# Patient Record
Sex: FEMALE | Race: WHITE | NOT HISPANIC OR LATINO | Employment: PART TIME | URBAN - METROPOLITAN AREA
[De-identification: names, ages, dates, MRNs, and addresses within clinical notes are randomized per-mention and may not be internally consistent; named-entity substitution may affect disease eponyms.]

---

## 2017-01-03 ENCOUNTER — TRANSCRIBE ORDERS (OUTPATIENT)
Dept: ADMINISTRATIVE | Facility: HOSPITAL | Age: 67
End: 2017-01-03

## 2017-01-03 DIAGNOSIS — Z12.31 SCREENING MAMMOGRAM FOR HIGH-RISK PATIENT: Primary | ICD-10-CM

## 2017-01-03 DIAGNOSIS — Z12.31 ENCOUNTER FOR SCREENING MAMMOGRAM FOR MALIGNANT NEOPLASM OF BREAST: ICD-10-CM

## 2017-01-03 DIAGNOSIS — R92.8 OTHER ABNORMAL AND INCONCLUSIVE FINDINGS ON DIAGNOSTIC IMAGING OF BREAST: ICD-10-CM

## 2017-01-17 ENCOUNTER — HOSPITAL ENCOUNTER (OUTPATIENT)
Dept: MAMMOGRAPHY | Facility: CLINIC | Age: 67
Discharge: HOME | End: 2017-01-17
Payer: COMMERCIAL

## 2017-01-17 ENCOUNTER — GENERIC CONVERSION - ENCOUNTER (OUTPATIENT)
Dept: OTHER | Facility: OTHER | Age: 67
End: 2017-01-17

## 2017-01-17 DIAGNOSIS — Z12.31 ENCOUNTER FOR SCREENING MAMMOGRAM FOR MALIGNANT NEOPLASM OF BREAST: ICD-10-CM

## 2017-01-17 PROCEDURE — G0202 SCR MAMMO BI INCL CAD: HCPCS

## 2017-01-23 ENCOUNTER — HOSPITAL ENCOUNTER (OUTPATIENT)
Dept: ULTRASOUND IMAGING | Facility: CLINIC | Age: 67
Discharge: HOME | End: 2017-01-23
Payer: COMMERCIAL

## 2017-01-23 ENCOUNTER — HOSPITAL ENCOUNTER (OUTPATIENT)
Dept: MAMMOGRAPHY | Facility: CLINIC | Age: 67
Discharge: HOME | End: 2017-01-23
Payer: COMMERCIAL

## 2017-01-23 DIAGNOSIS — R92.8 OTHER ABNORMAL AND INCONCLUSIVE FINDINGS ON DIAGNOSTIC IMAGING OF BREAST: ICD-10-CM

## 2017-01-23 PROCEDURE — 76642 ULTRASOUND BREAST LIMITED: CPT

## 2017-01-23 PROCEDURE — G0206 DX MAMMO INCL CAD UNI: HCPCS

## 2017-01-24 ENCOUNTER — GENERIC CONVERSION - ENCOUNTER (OUTPATIENT)
Dept: OTHER | Facility: OTHER | Age: 67
End: 2017-01-24

## 2017-05-11 ENCOUNTER — GENERIC CONVERSION - ENCOUNTER (OUTPATIENT)
Dept: OTHER | Facility: OTHER | Age: 67
End: 2017-05-11

## 2017-08-02 ENCOUNTER — ALLSCRIPTS OFFICE VISIT (OUTPATIENT)
Dept: OTHER | Facility: OTHER | Age: 67
End: 2017-08-02

## 2017-08-02 DIAGNOSIS — R93.7 ABNORMAL FINDINGS ON DIAGNOSTIC IMAGING OF OTHER PARTS OF MUSCULOSKELETAL SYSTEM: ICD-10-CM

## 2017-09-06 ENCOUNTER — LAB CONVERSION - ENCOUNTER (OUTPATIENT)
Dept: OTHER | Facility: OTHER | Age: 67
End: 2017-09-06

## 2017-09-06 LAB
A/G RATIO (HISTORICAL): 1.4 (CALC) (ref 1–2.5)
ALBUMIN SERPL BCP-MCNC: 3.9 G/DL (ref 3.6–5.1)
ALP SERPL-CCNC: 49 U/L (ref 33–130)
ALT SERPL W P-5'-P-CCNC: 14 U/L (ref 6–29)
AST SERPL W P-5'-P-CCNC: 18 U/L (ref 10–35)
BILIRUB SERPL-MCNC: 1 MG/DL (ref 0.2–1.2)
BUN SERPL-MCNC: 16 MG/DL (ref 7–25)
BUN/CREA RATIO (HISTORICAL): NORMAL (CALC) (ref 6–22)
CALCIUM SERPL-MCNC: 9 MG/DL (ref 8.6–10.4)
CHLORIDE SERPL-SCNC: 104 MMOL/L (ref 98–110)
CHOLEST SERPL-MCNC: 243 MG/DL
CHOLEST/HDLC SERPL: 2.5 (CALC)
CO2 SERPL-SCNC: 29 MMOL/L (ref 20–31)
CREAT SERPL-MCNC: 0.62 MG/DL (ref 0.5–0.99)
EGFR AFRICAN AMERICAN (HISTORICAL): 108 ML/MIN/1.73M2
EGFR-AMERICAN CALC (HISTORICAL): 93 ML/MIN/1.73M2
GAMMA GLOBULIN (HISTORICAL): 2.8 G/DL (CALC) (ref 1.9–3.7)
GLUCOSE (HISTORICAL): 88 MG/DL (ref 65–99)
HDLC SERPL-MCNC: 99 MG/DL
LDL CHOLESTEROL (HISTORICAL): 130 MG/DL (CALC)
NON-HDL-CHOL (CHOL-HDL) (HISTORICAL): 144 MG/DL (CALC)
POTASSIUM SERPL-SCNC: 4.2 MMOL/L (ref 3.5–5.3)
SODIUM SERPL-SCNC: 138 MMOL/L (ref 135–146)
TOTAL PROTEIN (HISTORICAL): 6.7 G/DL (ref 6.1–8.1)
TRIGL SERPL-MCNC: 47 MG/DL

## 2017-09-07 ENCOUNTER — GENERIC CONVERSION - ENCOUNTER (OUTPATIENT)
Dept: OTHER | Facility: OTHER | Age: 67
End: 2017-09-07

## 2017-09-12 ENCOUNTER — LAB CONVERSION - ENCOUNTER (OUTPATIENT)
Dept: OTHER | Facility: OTHER | Age: 67
End: 2017-09-12

## 2017-09-18 ENCOUNTER — GENERIC CONVERSION - ENCOUNTER (OUTPATIENT)
Dept: OTHER | Facility: OTHER | Age: 67
End: 2017-09-18

## 2017-09-18 LAB — OCCULT BLD, FECAL IMMUNOLOGICAL (HISTORICAL): NEGATIVE

## 2017-11-13 ENCOUNTER — HOSPITAL ENCOUNTER (OUTPATIENT)
Dept: MAMMOGRAPHY | Facility: CLINIC | Age: 67
Discharge: HOME | End: 2017-11-13
Payer: COMMERCIAL

## 2017-11-13 DIAGNOSIS — R93.7 ABNORMAL FINDINGS ON DIAGNOSTIC IMAGING OF OTHER PARTS OF MUSCULOSKELETAL SYSTEM: ICD-10-CM

## 2017-11-13 PROCEDURE — 77080 DXA BONE DENSITY AXIAL: CPT

## 2017-11-14 ENCOUNTER — GENERIC CONVERSION - ENCOUNTER (OUTPATIENT)
Dept: OTHER | Facility: OTHER | Age: 67
End: 2017-11-14

## 2018-01-10 NOTE — RESULT NOTES
Verified Results  *US BREAST RIGHT LIMITED (DIAGNOSTIC) [id number] 07:39AM Ascencion Michel Order Number: [id number]    - Patient Instructions: To schedule this appointment, please contact Central Scheduling at [phone]  Test Name Result Flag Reference   US BREAST RIGHT LIMITED (Report)     Patient History:   Patient is postmenopausal    Family history of breast cancer in mother at age 46 and prostate    cancer in brother at age 48 or over  Patient is a former smoker  Patient's BMI is 24 2  Reason for exam: additional evaluation requested from abnormal    screening  Asymmetric density in the upper outer right breast     Mammo Diagnostic Right W CAD: January 23, 2017 - Check In #:    [de-identified]   CC, MLO, and ML view(s) were taken of the right breast      Technologist: RT Andrew(R)(M)   Prior study comparison: January 17, 2017, mammo screening    bilateral W CAD, performed at [facility]  October 15, 2015, digital bilateral screening   mammogram, performed at [address] [place] [address]  There are scattered fibroglandular densities  These images were    obtained using digital technique and with the assistance of    Computer Aided Detection  There are no dominant masses, foci of    architectural distortion or suspicious clusters of calcification    to suggest malignancy  The visualized skin appears normal     Previously described asymmetric density less conspicuous on spot    compression views  Targeted ultrasound demonstrates no evidence of hypoechoic mass    or architectural distortion to suggest malignancy       US Breast Right Limited: January 23, 2017 - Check In #: [de-identified]   Technologist: Salomón Ordaz RDMS     ASSESSMENT: BiRad:2 - Benign (Overall)   Right breast Right Diag Mamm: BiRad:2 - benign finding in the    right breast    Right breast Right Brst US: BiRad:2 - benign finding in the right   breast  Recommendation:   Routine screening mammogram of both breasts in 1 year  Analyzed by CAD     Transcription Location: [address]   Signing Station: [id number]     Risk Value(s):   Tyrer-Cuzick 10 Year: 4 272%, Tyrer-Cuzick Lifetime: 8 481%,    Myriad Table: 1 5%, KAJAL 5 Year: 3 1%, NCI Lifetime: 11 0%   Signed by:   Valerio King MD   1/23/17                             MAMMO DIAGNOSTIC RIGHT W CAD [id number] 07:37AM Mala Compa Order Number: [id number]    - Patient Instructions: To schedule this appointment, please contact Central Scheduling at [phone]  Do not wear any perfume, powder, lotion or deodorant on breast or underarm area  Please bring your doctors order, referral (if needed) and insurance information with you on the day of the test  Failure to bring this information may result in this test being rescheduled  Arrive 15 minutes prior to your appointment time to register  On the day of your test, please bring any prior mammogram or breast studies with you that were not performed at a [facility]  Failure to bring prior exams may result in your test needing to be rescheduled  Test Name Result Flag Reference   MAMMO DIAGNOSTIC RIGHT W CAD (Report)     Patient History:   Patient is postmenopausal    Family history of breast cancer in mother at age 46 and prostate    cancer in brother at age 48 or over  Patient is a former smoker  Patient's BMI is 24 2  Reason for exam: additional evaluation requested from abnormal    screening  Asymmetric density in the upper outer right breast     Mammo Diagnostic Right W CAD: January 23, 2017 - Check In #:    [de-identified]   CC, MLO, and ML view(s) were taken of the right breast      Technologist: RT Suleman(R)(M)   Prior study comparison: January 17, 2017, mammo screening    bilateral W CAD, performed at [address]   October 15, 2015, digital bilateral screening   mammogram, performed at [address] [place] [address]  [address]  There are scattered fibroglandular densities  These images were    obtained using digital technique and with the assistance of    Computer Aided Detection  There are no dominant masses, foci of    architectural distortion or suspicious clusters of calcification    to suggest malignancy  The visualized skin appears normal     Previously described asymmetric density less conspicuous on spot    compression views  Targeted ultrasound demonstrates no evidence of hypoechoic mass    or architectural distortion to suggest malignancy  US Breast Right Limited: January 23, 2017 - Check In #: [de-identified]   Technologist: Sobia Sesay RDMS     ASSESSMENT: BiRad:2 - Benign (Overall)   Right breast Right Diag Mamm: BiRad:2 - benign finding in the    right breast    Right breast Right Brst US: BiRad:2 - benign finding in the right   breast      Recommendation:   Routine screening mammogram of both breasts in 1 year     Analyzed by CAD     Transcription Location: [address]   Signing Station: [id number]     Risk Value(s):   Tyrer-Cuzick 10 Year: 4 272%, Tyrer-Cuzick Lifetime: 8 481%,    Myriad Table: 1 5%, KAJAL 5 Year: 3 1%, NCI Lifetime: 11 0%   Signed by:   Carly Alexander MD   1/23/17

## 2018-01-10 NOTE — PROGRESS NOTES
Assessment    1  GERD (gastroesophageal reflux disease) (530 81) (K21 9)   2  Acute maxillary sinusitis (461 0) (J01 00)    Plan  Acute maxillary sinusitis    · Cephalexin 500 MG Oral Tablet (Cephalexin Monohydrate); TAKE 1 TABLET 3  times daily  GERD (gastroesophageal reflux disease)    · Omeprazole 20 MG Oral Capsule Delayed Release; take 1 capsule daily     start OTC allergy medication  if no improvement with omeprazole, call office for recheck     Chief Complaint  SINUS PRESSURE, GUM PAIN, TONGUE SENSITIVITY X 3WEEKS      History of Present Illness  HPI: sinus congestion, pain above teeth x 3 weeks, not improving  has seasonal allergies, ? allergies     also c/o epigastric pain, worse if eating/drinking late at night, also worse if eating greasy foods  ? acid reflux      Review of Systems    Constitutional: fever and feeling tired  ENT: nasal discharge  Respiratory: cough  Breasts: no complaints of breast pain, breast lump or nipple discharge  Gastrointestinal: abdominal pain, but as noted in HPI  Genitourinary: no complaints of dysuria, no incontinence, no pelvic pain, no dysmenorrhea, no vaginal discharge or abnormal vaginal bleeding  Musculoskeletal: no complaints of arthralgia, no myalgia, no joint swelling or stiffness, no limb pain or swelling  Integumentary: no complaints of skin rash or lesion, no itching or dry skin, no skin wounds  Neurological: no complaints of headache, no confusion, no numbness or tingling, no dizziness or fainting  Active Problems    1  Achilles tendinitis of left lower extremity (726 71) (M76 62)   2  Disorder of bone and cartilage (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 90) (M89 9,M94 9)   3  Hypercholesteremia (272 0) (E78 0)   4  Joint pain of ankle and foot (719 47) (M25 579)   5  Other screening mammogram (V76 12) (Z12 31)    Past Medical History  Active Problems And Past Medical History Reviewed: The active problems and past medical history were reviewed and updated today        Family History    1  Family history of malignant neoplasm of breast (V16 3) (Z80 3)  Family History Reviewed: The family history was reviewed and updated today  Social History    · Former smoker (G15 85) (Q18 533)   · [place]  The social history was reviewed and updated today  Surgical History  Surgical History Reviewed: The surgical history was reviewed and updated today  Current Meds    The medication list was reviewed and updated today  Allergies    1  No Known Drug Allergies    Vitals   Recorded: 21Jan2016 09:51AM   Heart Rate 76   Systolic 960   Diastolic 70   Height 5 ft    Weight 135 lb    BMI Calculated 26 37   BSA Calculated 1 57     Physical Exam    Constitutional   General appearance: No acute distress, well appearing and well nourished  Eyes   Conjunctiva and lids: No swelling, erythema or discharge  Ears, Nose, Mouth, and Throat   External inspection of ears and nose: Normal     Otoscopic examination: Tympanic membranes translucent with normal light reflex  Canals patent without erythema  Nasal mucosa, septum, and turbinates: Normal without edema or erythema  Oropharynx: Normal with no erythema, edema, exudate or lesions  + sinus congestion noted  Pulmonary   Respiratory effort: No increased work of breathing or signs of respiratory distress  Auscultation of lungs: Clear to auscultation  Cardiovascular   Auscultation of heart: Normal rate and rhythm, normal S1 and S2, without murmurs      Musculoskeletal   Gait and station: Normal     Psychiatric   Orientation to person, place, and time: Normal     Mood and affect: Normal          Signatures   Electronically signed by : Yvrose Nino; Jan 21 2016 12:09PM EST                       (Author)    Electronically signed by : Mihaela Dutton MD; Jan 21 2016  2:56PM EST

## 2018-01-12 NOTE — RESULT NOTES
Verified Results  (Q) FECAL GLOBIN BY IMMUNOCHEMISTRY [id number] 12:00AM Andrea Sensor     Test Name Result Flag Reference   FECAL GLOBIN BY$IMMUNOCHEMISTRY      FECAL GLOBIN BY IMMUNOCHEMISTRY         MICRO NUMBER:      [id number]    TEST STATUS:       FINAL    SPECIMEN SOURCE:   INSURE (TM) FOBT TEST CARD    SPECIMEN QUALITY:  ADEQUATE    RESULT:            Not Detected                                               We received an [employer] card with a non-specific                       order  Based upon the specimen submitted, the                       fecal globin test was performed  If this is not                       what you intended to order, please contact your                       local  immediately                       so that we can adjust our billing appropriately  You may also inquire about alternative or                        additional testing

## 2018-01-12 NOTE — RESULT NOTES
Verified Results  * MAMMO SCREENING BILATERAL W CAD [id number] 07:34AM Pilar Perez Order Number: [id number]    - Patient Instructions: To schedule this appointment, please contact Central Scheduling at [phone]  Do not wear any perfume, powder, lotion or deodorant on breast or underarm area  Please bring your doctors order, referral (if needed) and insurance information with you on the day of the test  Failure to bring this information may result in this test being rescheduled  Arrive 15 minutes prior to your appointment time to register  On the day of your test, please bring any prior mammogram or breast studies with you that were not performed at a [facility]  Failure to bring prior exams may result in your test needing to be rescheduled  Test Name Result Flag Reference   MAMMO SCREENING BILATERAL W CAD (Report)     Patient History:   Patient is postmenopausal    Family history of breast cancer in mother at age 46 and prostate    cancer in brother at age 48 or over  Patient is a former smoker  Patient's BMI is 24 2  Reason for exam: screening (asymptomatic)  Screening     Mammo Screening Bilateral W CAD: January 17, 2017 - Check In #:    [de-identified]   Bilateral MLO and CC view(s) were taken  Technologist: PERFECTO Gardner (R)(M)   Prior study comparison: October 15, 2015, digital bilateral    screening mammogram performed at [facility]  August 21, 2014, digital bilateral screening    mammogram performed at [address] [place] [address]  March 13, 2013, digital bilateral screening mammogram    performed at [facility]  January 16, 2012, digital bilateral screening mammogram performed   at [address]  There are scattered fibroglandular densities   No dominant soft    tissue mass, architectural distortion or suspicious    calcifications are noted in either breast  The skin and nipple    contours are within normal limits  There is faintly increased asymmetry in the right outer breast at   the 9 o'clock position at about the mid 3rd depth of the breast    compared with prior examinations  Recommend spot compression    imaging for further assessment  Ultrasound might be helpful as    well  The finding is about 8 cm from nipple  Needs additional imaging  ASSESSMENT: BiRad:0 - Incomplete: needs additional imaging    evaluation - Right     Recommendation:   Further imaging of the right breast    A breast health care nurse from our facility will be contacting    the patient regarding the need for additional imaging  Analyzed by CAD     8-10% of cancers will be missed on mammography  Management of a    palpable abnormality must be based on clinical grounds  Patients   will be notified of their results via letter from our facility  Accredited by [employer] of [employer] and FDA       Transcription Location:  [address]: [id number]     Risk Value(s):   Tyrer-Cuzick 10 Year: 4 272%, Tyrer-Cuzick Lifetime: 8 481%,    Myriad Table: 1 5%, KAJAL 5 Year: 3 1%, NCI Lifetime: 11 0%   Signed by:   Shon Buerger, MD   1/17/17

## 2018-01-13 VITALS
SYSTOLIC BLOOD PRESSURE: 124 MMHG | HEART RATE: 75 BPM | BODY MASS INDEX: 20.42 KG/M2 | WEIGHT: 104 LBS | OXYGEN SATURATION: 98 % | HEIGHT: 60 IN | DIASTOLIC BLOOD PRESSURE: 64 MMHG

## 2018-01-13 NOTE — RESULT NOTES
Message   Recorded as Task   Date: 01/24/2017 06:27 AM, Created By: Mariah Hunter   Task Name: Call Patient with results   Assigned To:  Alan Connor   Regarding Patient: Rene Sparrow, Status: Active   CommentDominique Clause - 24 Jan 2017 6:27 AM     Patient Phone: [phone] - 24 Jan 2017 9:25 AM     TASK EDITED  Msg C# with results        Signatures   Electronically signed by : Jarrod Vazquez, ; Jan 24 2017  9:25AM EST                       (Author)

## 2018-01-16 NOTE — RESULT NOTES
Verified Results  (1) COMPREHENSIVE METABOLIC PANEL [id number] 71:27GD Bogdan Streeter   REPORT COMMENT:  FASTING:YES  MULTIPLE TESTING PRIORITIES; ROUTINE TESTING TO FOLLOW  Test Name Result Flag Reference   GLUCOSE 88 mg/dL  65-99   Fasting reference interval   UREA NITROGEN (BUN) 16 mg/dL  7-25   CREATININE 0 62 mg/dL  0 50-0 99   For patients >52years of age, the reference limit  for Creatinine is approximately 13% higher for people  identified as -American  eGFR NON-AFR  AMERICAN 93 mL/min/1 73m2  > OR = 60   eGFR AFRICAN AMERICAN 108 mL/min/1 73m2  > OR = 60   BUN/CREATININE RATIO   0-76   NOT APPLICABLE (calc)   SODIUM 138 mmol/L  135-146   POTASSIUM 4 2 mmol/L  3 5-5 3   CHLORIDE 104 mmol/L     CARBON DIOXIDE 29 mmol/L  20-31   CALCIUM 9 0 mg/dL  8 6-10 4   PROTEIN, TOTAL 6 7 g/dL  6 1-8 1   ALBUMIN 3 9 g/dL  3 6-5 1   GLOBULIN 2 8 g/dL (calc)  1 9-3 7   ALBUMIN/GLOBULIN RATIO 1 4 (calc)  1 0-2 5   BILIRUBIN, TOTAL 1 0 mg/dL  0 2-1 2   ALKALINE PHOSPHATASE 49 U/L     AST 18 U/L  10-35   ALT 14 U/L  6-29     (Q) LIPID PANEL WITH REFLEX TO DIRECT LDL [id number] 08:49AM Bogdan Streeter     Test Name Result Flag Reference   CHOLESTEROL, TOTAL 243 mg/dL H <200   HDL CHOLESTEROL 99 mg/dL  >19   TRIGLICERIDES 47 mg/dL  <682   LDL-CHOLESTEROL 130 mg/dL (calc) H    Reference range: <100     Desirable range <100 mg/dL for patients with CHD or  diabetes and <70 mg/dL for diabetic patients with  known heart disease  The [facility] calculation is a validated novel   method that provides better accuracy than the   Friedewald equation in the estimation of LDL-C,   particularly when TG levels are 150-400 mg/dL and   LDL-C levels are lower than 70 mg/dL  Reference:  Marjorie Richardson et al  Comparison of a Novel   Method vs the Friedewald Equation for Estimating   Low-Density Lipoprotein Cholesterol Levels From the   Standard Lipid Profile  MILLA  4461;791(56): 0733-8218       For additional information, please refer to  http://education  [place]/faq/OQX664  (This link is being provided for informational/  educational purposes only )   CHOL/HDLC RATIO 2 5 (calc)  <5 0   NON HDL CHOLESTEROL 144 mg/dL (calc) H <130   For patients with diabetes plus 1 major ASCVD risk   factor, treating to a non-HDL-C goal of <100 mg/dL   (LDL-C of <70 mg/dL) is considered a therapeutic   option

## 2018-01-17 NOTE — RESULT NOTES
Verified Results  * DXA BONE DENSITY SPINE HIP AND PELVIS [id number] 02:01PM Lai Arriaza Order Number: [id number]    - Patient Instructions: To schedule this appointment, please contact Central Scheduling at [phone]  Test Name Result Flag Reference   DXA BONE DENSITY SPINE HIP AND PELVIS (Report)     CENTRAL DXA SCAN     CLINICAL HISTORY:  79year old post-menopausal  female risk factors include previous smoking  TECHNIQUE: Bone densitometry was performed using a [employer] C bone densitometer  Regions of interest appear properly placed  There are no obvious fractures or other confounding variables which could limit the study  Degenerative changes of the   lumbar spine and hip  This will falsely elevate the bone mineral densities in these regions  COMPARISON: Several, most recent March 13, 2013     RESULTS:    LUMBAR SPINE: L1-L4:   BMD 0 817 gm/cm2   T-score -2 1   Z-score -0 2     LUMBAR SPINE L1-L3 (average) :         BMD 0 789 gm/sq-cm        T-score is -2 1         Z-score is -0 2          LEFT TOTAL HIP:   BMD 0 794 gm/cm2   T-score -1 2   Z-score 0 1     LEFT FEMORAL NECK:   BMD 0 671 gm/cm2   T-score -1 6   Z-score 0 0             IMPRESSION:   1  Based on the [facility] classification, the T-score of -2 1 in the lumbar spine is consistent with low bone mineral density  2  When compared to the prior examination, there has been a 6 % DECREASE in the total bone mineral density of the lumbar spine and a 10 % DECREASE in the total bone mineral density of the left hip  3  Any secondary causes of low bone mineral density should be excluded prior to treatment, if clinically indicated  4  A daily intake of at least 1200 mg calcium and 800 to 1000 IU of Vitamin D, as well as weight bearing and muscle strengthening exercise, fall prevention and avoidance of tobacco and excessive alcohol intake as basic preventive measures are suggested     5  Repeat DXA in 18 - 24 months, on the same machine, as clinically indicated  The 10 year risk of hip fracture is 1%, with the 10 year risk of major osteoporotic fracture being 9%, as calculated by the [facility] fracture risk assessment tool (FRAX)  The current NOF guidelines recommend treating patients with FRAX 10 year risk score of    >3% for hip fracture and >20% for major osteoporotic fracture        WHO CLASSIFICATION:   Normal (a T-score of -1 0 or higher)   Low bone mineral density (a T-score of less than -1 0 but higher than -2 5)   Osteoporosis (a T-score of -2 5 or less)   Severe osteoporosis (a T-score of -2 5 or less with a fragility fracture)             Workstation performed: [id number]     Signed by:   Wanda Shea DO   11/14/17

## 2018-01-23 ENCOUNTER — TRANSCRIBE ORDERS (OUTPATIENT)
Dept: ADMINISTRATIVE | Facility: HOSPITAL | Age: 68
End: 2018-01-23

## 2018-01-29 ENCOUNTER — TELEPHONE (OUTPATIENT)
Dept: FAMILY MEDICINE CLINIC | Facility: CLINIC | Age: 68
End: 2018-01-29

## 2018-01-29 DIAGNOSIS — Z12.31 SCREENING MAMMOGRAM, ENCOUNTER FOR: Primary | ICD-10-CM

## 2018-05-14 ENCOUNTER — HOSPITAL ENCOUNTER (OUTPATIENT)
Dept: MAMMOGRAPHY | Facility: CLINIC | Age: 68
Discharge: HOME | End: 2018-05-14

## 2018-05-14 ENCOUNTER — TELEPHONE (OUTPATIENT)
Dept: FAMILY MEDICINE CLINIC | Facility: CLINIC | Age: 68
End: 2018-05-14

## 2018-05-14 DIAGNOSIS — Z12.31 SCREENING MAMMOGRAM, ENCOUNTER FOR: ICD-10-CM

## 2018-05-14 PROCEDURE — 77067 SCR MAMMO BI INCL CAD: CPT

## 2018-05-14 NOTE — TELEPHONE ENCOUNTER
----- Message from Viral Smith MD sent at 5/14/2018  4:55 PM EDT -----  Call with result-repeat 1 year

## 2018-12-12 ENCOUNTER — OFFICE VISIT (OUTPATIENT)
Dept: FAMILY MEDICINE CLINIC | Facility: CLINIC | Age: 68
End: 2018-12-12
Payer: COMMERCIAL

## 2018-12-12 VITALS
OXYGEN SATURATION: 98 % | BODY MASS INDEX: 23.75 KG/M2 | HEART RATE: 90 BPM | HEIGHT: 60 IN | SYSTOLIC BLOOD PRESSURE: 120 MMHG | WEIGHT: 121 LBS | DIASTOLIC BLOOD PRESSURE: 70 MMHG | RESPIRATION RATE: 14 BRPM

## 2018-12-12 DIAGNOSIS — E78.00 HYPERCHOLESTEREMIA: ICD-10-CM

## 2018-12-12 DIAGNOSIS — Z12.11 SCREEN FOR COLON CANCER: ICD-10-CM

## 2018-12-12 DIAGNOSIS — Z23 NEED FOR VACCINATION: ICD-10-CM

## 2018-12-12 DIAGNOSIS — Z12.11 SCREENING FOR COLON CANCER: ICD-10-CM

## 2018-12-12 DIAGNOSIS — B07.0 PLANTAR WART: ICD-10-CM

## 2018-12-12 DIAGNOSIS — M67.972 ACHILLES TENDON DISORDER, LEFT: Primary | ICD-10-CM

## 2018-12-12 DIAGNOSIS — Z13.6 SCREENING FOR CARDIOVASCULAR CONDITION: ICD-10-CM

## 2018-12-12 DIAGNOSIS — Z11.59 NEED FOR HEPATITIS C SCREENING TEST: ICD-10-CM

## 2018-12-12 PROBLEM — M18.11 DEGENERATIVE ARTHRITIS OF THUMB, RIGHT: Status: ACTIVE | Noted: 2017-08-02

## 2018-12-12 PROBLEM — R93.7 ABNORMAL BONE DENSITY SCREENING: Status: ACTIVE | Noted: 2017-08-02

## 2018-12-12 PROCEDURE — 3008F BODY MASS INDEX DOCD: CPT | Performed by: FAMILY MEDICINE

## 2018-12-12 PROCEDURE — 1160F RVW MEDS BY RX/DR IN RCRD: CPT | Performed by: FAMILY MEDICINE

## 2018-12-12 PROCEDURE — 1036F TOBACCO NON-USER: CPT | Performed by: FAMILY MEDICINE

## 2018-12-12 PROCEDURE — 99397 PER PM REEVAL EST PAT 65+ YR: CPT | Performed by: FAMILY MEDICINE

## 2018-12-12 PROCEDURE — 99214 OFFICE O/P EST MOD 30 MIN: CPT | Performed by: FAMILY MEDICINE

## 2018-12-12 RX ORDER — PREDNISONE 20 MG/1
TABLET ORAL
Qty: 15 TABLET | Refills: 0 | Status: SHIPPED | OUTPATIENT
Start: 2018-12-12 | End: 2019-03-11 | Stop reason: ALTCHOICE

## 2018-12-12 NOTE — PATIENT INSTRUCTIONS
Medical management-stretches and ice to the Achilles tendon as discussed  Will give short course of prednisone to see if this helps  If no relief referral for physical therapy  Will check lipid panel and CMP to check status of high cholesterol  As far as the wart on the bottom the foot-soak each night and a breakdown with a pumice stone  You can also get some compound W to put on their once a night and cover with a Band-Aid  Health maintenance-check CMP, lipid panel, hepatitis B  You should have a colonoscopy  You have declined her vaccines  Depression screen is negative  Advanced directives were discussed and forms provided

## 2018-12-12 NOTE — PROGRESS NOTES
[address] [facility]        NAME: Brooklyn Fofana is a 76 y o  female  : 1950    MRN: [id number]  DATE: 2018  TIME: 7:50 AM    Assessment and Plan   Need for vaccination [Z23]  1  Need for vaccination  influenza vaccine, [id number], high-dose, PF 0 5 mL, for patients 65 yr+ (FLUZONE HIGH-DOSE)   2  Screening for cardiovascular condition     3  Screen for colon cancer     4  Need for hepatitis C screening test     5  Screening for colon cancer  Ambulatory referral to Gastroenterology       No problem-specific Assessment & Plan notes found for this encounter  Patient Instructions     There are no Patient Instructions on file for this visit  Chief Complaint     Chief Complaint   Patient presents with    Leg Pain     left leg pain    Annual Exam     HM         History of Present Illness       HPI    Review of Systems   Review of Systems   Constitutional: Negative for activity change, appetite change, diaphoresis and fatigue  Respiratory: Negative for cough, chest tightness, shortness of breath and wheezing  Cardiovascular: Negative for chest pain, palpitations and leg swelling  Fast or slow heart rate   Gastrointestinal: Negative for abdominal pain, blood in stool, constipation, diarrhea, nausea and vomiting  Genitourinary: Negative for difficulty urinating, dysuria and frequency  Musculoskeletal: Negative for arthralgias, gait problem, joint swelling and myalgias  Neurological: Negative for dizziness, weakness, light-headedness, numbness and headaches  Psychiatric/Behavioral: Negative for agitation, confusion, dysphoric mood and sleep disturbance  The patient is not nervous/anxious  Current Medications     No current outpatient prescriptions on file      Current Allergies     Allergies as of 2018    (No Known Allergies)            The following portions of the patient's history were reviewed and updated as appropriate: allergies, current medications, past family history, past medical history, past social history, past surgical history and problem list      No past medical history on file  No past surgical history on file  Family History   Problem Relation Age of Onset   Rebecca Berman Breast cancer Mother     Lung cancer Mother     Diabetes Mother     Substance Abuse Neg Hx     Mental illness Neg Hx          Medications have been verified  Objective   /70 (BP Location: Left arm, Patient Position: Sitting, Cuff Size: Standard)   Pulse 90   Resp 14   Ht 5' (1 524 m)   Wt 54 9 kg (121 lb)   SpO2 98%   BMI 23 63 kg/m²        Physical Exam     Physical Exam   Constitutional: She is oriented to person, place, and time  She appears well-developed and well-nourished  No distress  HENT:   Head: Normocephalic and atraumatic  Right Ear: Tympanic membrane and external ear normal  No drainage  Left Ear: Tympanic membrane normal  No drainage  Mouth/Throat: No oropharyngeal exudate  Eyes: Conjunctivae and EOM are normal  Right eye exhibits no discharge  Left eye exhibits no discharge  Neck: Normal range of motion  Neck supple  No thyromegaly present  Cardiovascular: Normal rate, regular rhythm and normal heart sounds  Pulmonary/Chest: Effort normal  No respiratory distress  She has no wheezes  She has no rales  Abdominal: Soft  She exhibits no mass  There is no tenderness  There is no guarding  Musculoskeletal: She exhibits no edema  Lymphadenopathy:     She has no cervical adenopathy  Neurological: She is alert and oriented to person, place, and time  Psychiatric: She has a normal mood and affect   Her behavior is normal

## 2018-12-12 NOTE — PROGRESS NOTES
Subjective:   Chief Complaint   Patient presents with    Leg Pain     left leg pain    Annual Exam             Patient ID: Fiorella Speaker is a 76 y o  female  Patient comes into day for evaluation of 2 issues involving her left foot  1) tenderness at the Achilles tendon  As day goes on it extends up to the leg  Become so severe that also affects her left knee  2) probable wart on the plantar aspect of the heel  This is somewhat tender to palpation  3) hyperlipidemia-no current medication no recent check  She does try to watch her diet  The following portions of the patient's history were reviewed and updated as appropriate: allergies, current medications, past family history, past medical history, past social history, past surgical history and problem list     Review of Systems   Constitutional: Negative for activity change, appetite change, diaphoresis and fatigue  Respiratory: Negative for cough, chest tightness, shortness of breath and wheezing  Cardiovascular: Negative for chest pain, palpitations and leg swelling  Fast or slow heart rate   Gastrointestinal: Negative for abdominal pain, blood in stool, constipation, diarrhea, nausea and vomiting  Genitourinary: Negative for difficulty urinating, dysuria and frequency  Musculoskeletal: Positive for gait problem  Negative for arthralgias, joint swelling and myalgias  Skin: Negative for rash and wound  Neurological: Negative for dizziness, light-headedness and headaches  Psychiatric/Behavioral: Negative for agitation, confusion, dysphoric mood and sleep disturbance  The patient is not nervous/anxious  Objective:  Vitals:    12/12/18 0730   BP: 120/70   BP Location: Left arm   Patient Position: Sitting   Cuff Size: Standard   Pulse: 90   Resp: 14   SpO2: 98%   Weight: 54 9 kg (121 lb)   Height: 5' (1 524 m)      Physical Exam   Constitutional: She is oriented to person, place, and time   She appears well-developed and well-nourished  No distress  HENT:   Head: Normocephalic  Eyes: Pupils are equal, round, and reactive to light  Conjunctivae are normal    Cardiovascular: Normal rate and normal heart sounds  No murmur heard  Pulmonary/Chest: Effort normal and breath sounds normal  No respiratory distress  Musculoskeletal:        Left ankle: She exhibits no swelling  Tenderness  There is tenderness at the left Achilles tendon down into the insertion  No defect is palpable  Increased pain with dorsiflexion of the foot  There is no calf compression tenderness   Neurological: She is alert and oriented to person, place, and time  Skin:   Left heel-there is a proximally 4-5 mm skin lesion which appears to be a wart  It could possibly also be a corn  There is tenderness with palpation  Assessment/Plan:    No problem-specific Assessment & Plan notes found for this encounter  Diagnoses and all orders for this visit:    Need for vaccination  -     influenza vaccine, 4091-0860, high-dose, PF 0 5 mL, for patients 65 yr+ (FLUZONE HIGH-DOSE)    Screening for cardiovascular condition  -     Comprehensive metabolic panel; Future  -     Comprehensive metabolic panel    Screen for colon cancer    Need for hepatitis C screening test  -     Hepatitis C Ab W/Refl To HCV RNA, Qn, PCR; Future  -     Hepatitis C Ab W/Refl To HCV RNA, Qn, PCR    Screening for colon cancer  -     Cancel: Ambulatory referral to Gastroenterology; Future  -     Ambulatory referral to Gastroenterology; Future    Hypercholesteremia  -     Comprehensive metabolic panel; Future  -     Lipid panel; Future  -     Comprehensive metabolic panel  -     Lipid panel    Achilles tendon disorder, left  -     predniSONE 20 mg tablet; 1 tab twice daily for 5 days, then 1 tab daily for 5 days  -     Ambulatory referral to Physical Therapy; Future    Plantar wart      Medical management-stretches and ice to the Achilles tendon as discussed  Will give short course of prednisone to see if this helps  If no relief referral for physical therapy  Will check lipid panel and CMP to check status of high cholesterol  As far as the wart on the bottom the foot-soak each night and a breakdown with a pumice stone  You can also get some compound W to put on their once a night and cover with a Band-Aid

## 2018-12-15 LAB
ALBUMIN SERPL-MCNC: 4.1 G/DL (ref 3.6–5.1)
ALBUMIN/GLOB SERPL: 1.5 (CALC) (ref 1–2.5)
ALP SERPL-CCNC: 58 U/L (ref 33–130)
ALT SERPL-CCNC: 14 U/L (ref 6–29)
AST SERPL-CCNC: 18 U/L (ref 10–35)
BILIRUB SERPL-MCNC: 1.4 MG/DL (ref 0.2–1.2)
BUN SERPL-MCNC: 18 MG/DL (ref 7–25)
BUN/CREAT SERPL: ABNORMAL (CALC) (ref 6–22)
CALCIUM SERPL-MCNC: 9.4 MG/DL (ref 8.6–10.4)
CHLORIDE SERPL-SCNC: 105 MMOL/L (ref 98–110)
CHOLEST SERPL-MCNC: 259 MG/DL
CHOLEST/HDLC SERPL: 2.4 (CALC)
CO2 SERPL-SCNC: 30 MMOL/L (ref 20–32)
CREAT SERPL-MCNC: 0.54 MG/DL (ref 0.5–0.99)
GLOBULIN SER CALC-MCNC: 2.8 G/DL (CALC) (ref 1.9–3.7)
GLUCOSE SERPL-MCNC: 91 MG/DL (ref 65–99)
HCV AB S/CO SERPL IA: 0.01
HCV AB SERPL QL IA: NORMAL
HDLC SERPL-MCNC: 109 MG/DL
LDLC SERPL CALC-MCNC: 135 MG/DL (CALC)
NONHDLC SERPL-MCNC: 150 MG/DL (CALC)
POTASSIUM SERPL-SCNC: 3.9 MMOL/L (ref 3.5–5.3)
PROT SERPL-MCNC: 6.9 G/DL (ref 6.1–8.1)
SL AMB EGFR AFRICAN AMERICAN: 112 ML/MIN/1.73M2
SL AMB EGFR NON AFRICAN AMERICAN: 97 ML/MIN/1.73M2
SODIUM SERPL-SCNC: 140 MMOL/L (ref 135–146)
TRIGL SERPL-MCNC: 59 MG/DL

## 2018-12-17 ENCOUNTER — TELEPHONE (OUTPATIENT)
Dept: FAMILY MEDICINE CLINIC | Facility: CLINIC | Age: 68
End: 2018-12-17

## 2018-12-17 NOTE — PROGRESS NOTES
Call patient with lab result-cholesterol is still elevated-will if she except medication to treat this? Other labs are okay

## 2018-12-17 NOTE — TELEPHONE ENCOUNTER
----- Message from Roma Blackman MD sent at 12/17/2018 12:59 PM EST -----  Call patient with lab result-cholesterol is still elevated-will if she except medication to treat this? Other labs are okay

## 2019-01-21 ENCOUNTER — EVALUATION (OUTPATIENT)
Dept: PHYSICAL THERAPY | Facility: CLINIC | Age: 69
End: 2019-01-21
Payer: COMMERCIAL

## 2019-01-21 DIAGNOSIS — M67.972 ACHILLES TENDON DISORDER, LEFT: ICD-10-CM

## 2019-01-21 PROCEDURE — 97162 PT EVAL MOD COMPLEX 30 MIN: CPT | Performed by: PHYSICAL THERAPIST

## 2019-01-21 NOTE — PROGRESS NOTES
PT Evaluation     Today's date: 2019  Patient name: Lencho Donovan  : 1950  MRN: [id number]  Referring provider: Jose Rowell MD  Dx:   Encounter Diagnosis     ICD-10-CM    1  Achilles tendon disorder, left M67 972 Ambulatory referral to Physical Therapy       Start Time: 1630  Stop Time: 3472  Total time in clinic (min): 45 minutes    Assessment/Plan    This patient presents w/ L lower leg pain of one year duration  In addition to localized achilles symptoms, she also exhibits L knee dysfunction, myofacial tenderness and increased neural tension signs  This patient is a good candidate for skilled physical therapy to reduce pain and improve function  Personal factors; will not be able to begin therapy for 2 weeks  Interventions to include manual therapy, ROM, stretching, strengthening, gait and balance training, therapeutic activities, neuromuscular re-ed and instruction in HEP  Frequency and duration: 2 x/week for6-8  weeks    STGs: 4 weeks  1  Reduce tenderness in calf and HS mm  2  Decrease pain 3 levels  3  Restore full pain-free ROM L knee (equal to R knee)              LTGs: 6-8 weeks  1  Independent HEP  2  Able to perform HR and SLS LLE without pain  3  Increase FOTO score to predicted level  4  Able to stand at work without pain >2/10  5  Able to lift leg into car without pain          Subjective  This is a 76 y o  female who reports onset of symptoms more than a year ago  Mechanism of injury: insidious  Current complaint: Pain in the back of the heel and goes up into the lateral calf to the knee  Feels best in the morning, worse as the day progresses  Sometimes has difficulty sleeping due to pain     Aggravating factors: too much and standing/ walking; difficulty bending leg to get foot into car at the end of the day  Relieving factors: rest  Previous treatment: prednisone  Dx tests: none  Occupation: works at [employer] - standing all day  Leisure: works with a , golf  Patients goal: to eliminate some of the pain    Objective  Pain scale: 2-8/10  Gait: WNL  Lumbar screen: WNL  Palpation: tender L achilles, L lateral gastroc, lateral HS insertion, L fibular head  Neural tension tests: comparable sign w/ sciatic and femoral tensioning  Functional strength tests:  SLS: able to maintain balance on either LE for > 30 sec but painful on L  HR: able to perform > 10 reps either LE but painful on L      left     Hip   AROM  PROM  Strength  flexion   5/5*   extension   5/5   Abduction   5/5   adduction   5/5   ER      IR      Knee   AROM  PROM  Strength  Flexion 125* 130* 5/5*   Extension WNL* WNL* 5/5*   Ankle   AROM  PROM  Strength  DF 15  5/5   PF 40     Inversion 40  5/5   Eversion 10  5/5   PKB: L 117 degrees * ; R 125 degrees  * comparable sign  right extremity ROM / strength WFL        Precautions: none  POC expires: 2/21/19  Daily Treatment Diary     Manual  1/21            PROM L knee             Mobs fib head             TrP release L lateral gastroc             K-tape achilles             biomech eval if needed                 Exercise Diary  1/21                                                   Prone femoral nerve glide             Sciatic nerve sliders             AROM L knee flexion             Eccentric heel lowering             SLS on foam             CC: soleus deceleration                                                                                                                                                                Modalities              CP prn

## 2019-02-04 ENCOUNTER — OFFICE VISIT (OUTPATIENT)
Dept: PHYSICAL THERAPY | Facility: CLINIC | Age: 69
End: 2019-02-04
Payer: COMMERCIAL

## 2019-02-04 DIAGNOSIS — M67.972 ACHILLES TENDON DISORDER, LEFT: Primary | ICD-10-CM

## 2019-02-04 PROCEDURE — 97140 MANUAL THERAPY 1/> REGIONS: CPT | Performed by: PHYSICAL THERAPIST

## 2019-02-04 PROCEDURE — 97112 NEUROMUSCULAR REEDUCATION: CPT | Performed by: PHYSICAL THERAPIST

## 2019-02-04 NOTE — PROGRESS NOTES
Daily Note     Today's date: 2019  Patient name: Shana Goode  : 1950  MRN: [id number]  Referring provider: Danis Amado MD  Dx:   Encounter Diagnosis     ICD-10-CM    1  Achilles tendon disorder, left M67 972        Start Time:   Stop Time:   Total time in clinic (min): 42 minutes    Subjective: has not been on feet much so doesn't feel too bad today  Returns to work tomorrow  States that at end of work day she is unable to bend her knee to get it into car - feels stiff  Objective: See treatment diary below      Assessment: Initiated exercise today  Attempted recumbent bike but caused clicking in the L knee  Unable to reproduce symptoms in therapy - states that lower leg pan is aggravated by prolonged standing at work by end of day, Discussed foot wear  Trial of low dye tape  Knee ROM is WNL in therapy today  Tolerated treatment well  Patient would benefit from continued PT      Plan: Continue per plan of care         Precautions: none  POC expires: 19  Daily Treatment Diary     Manual             PROM L knee  JR           Mobs fib head             TrP release L lateral gastroc  attempted           K-tape achilles             Low dye tape  JR                        biomech eval if needed                 Exercise Diary                                                    Prone femoral nerve glide             Sciatic nerve sliders  15x           AROM L knee flexion             Eccentric heel lowering  10x           SLS on foam  B 10" x10           CC: soleus deceleration  2BL2 20x           Lunge bosu                                                                                                                                  Recumbent bike  attempted               Modalities              CP prn

## 2019-02-06 ENCOUNTER — OFFICE VISIT (OUTPATIENT)
Dept: PHYSICAL THERAPY | Facility: CLINIC | Age: 69
End: 2019-02-06
Payer: COMMERCIAL

## 2019-02-06 DIAGNOSIS — M67.972 ACHILLES TENDON DISORDER, LEFT: Primary | ICD-10-CM

## 2019-02-06 PROCEDURE — 97112 NEUROMUSCULAR REEDUCATION: CPT | Performed by: PHYSICAL THERAPIST

## 2019-02-06 PROCEDURE — 97140 MANUAL THERAPY 1/> REGIONS: CPT | Performed by: PHYSICAL THERAPIST

## 2019-02-06 NOTE — PROGRESS NOTES
Daily Note     Today's date: 2019  Patient name: Claudio Quijano  : 1950  MRN: [id number]  Referring provider: Jose Funk MD  Dx:   Encounter Diagnosis     ICD-10-CM    1  Achilles tendon disorder, left M67 972        Start Time: 730  Stop Time: 819  Total time in clinic (min): 49 minutes    Subjective: pain lateral proximal L calf, locking of knee at mid range when lifting it into a car      Objective: See treatment diary below      Assessment: exhibits increased pronation B feet which may be contributing to calf and knee symptoms  Instructed in neutral arch  Given advice re: footwear for increased control  Tenderness in the lateral HS and calf  (+) [place]  Tolerated treatment well  Patient would benefit from continued PT      Plan: Continue per plan of care         Precautions: none  POC expires: 19  Daily Treatment Diary     Manual   2          PROM L knee  JR           Mobs fib head   JR          TrP release L lateral gastroc  attempted JR          K-tape achilles             Low dye tape  JR           TrP release L HS   JR          biomech eval   JR              Exercise Diary            Neutral arch   10x          Great toe raise   10x          Mini squat w/ neutral arch   10x          Prone femoral nerve glide             Sciatic nerve sliders  15x           AROM L knee flexion             Eccentric heel lowering  10x 20x          SLS on foam  B 10" x10           CC: soleus deceleration  2BL2 20x 2BL2 20x          Lunge bosu   nv                                                                                                                               Recumbent bike  attempted Hold               Modalities              CP prn

## 2019-02-11 ENCOUNTER — TELEPHONE (OUTPATIENT)
Dept: FAMILY MEDICINE CLINIC | Facility: CLINIC | Age: 69
End: 2019-02-11

## 2019-02-11 DIAGNOSIS — J11.1 INFLUENZA: Primary | ICD-10-CM

## 2019-02-11 RX ORDER — OSELTAMIVIR PHOSPHATE 75 MG/1
75 CAPSULE ORAL EVERY 12 HOURS SCHEDULED
Qty: 10 CAPSULE | Refills: 0 | Status: SHIPPED | OUTPATIENT
Start: 2019-02-11 | End: 2019-02-16

## 2019-02-13 ENCOUNTER — APPOINTMENT (OUTPATIENT)
Dept: PHYSICAL THERAPY | Facility: CLINIC | Age: 69
End: 2019-02-13
Payer: COMMERCIAL

## 2019-02-15 ENCOUNTER — APPOINTMENT (OUTPATIENT)
Dept: PHYSICAL THERAPY | Facility: CLINIC | Age: 69
End: 2019-02-15
Payer: COMMERCIAL

## 2019-02-18 ENCOUNTER — OFFICE VISIT (OUTPATIENT)
Dept: PHYSICAL THERAPY | Facility: CLINIC | Age: 69
End: 2019-02-18
Payer: COMMERCIAL

## 2019-02-18 DIAGNOSIS — M67.972 ACHILLES TENDON DISORDER, LEFT: Primary | ICD-10-CM

## 2019-02-18 PROCEDURE — 97140 MANUAL THERAPY 1/> REGIONS: CPT | Performed by: PHYSICAL THERAPIST

## 2019-02-18 PROCEDURE — 97112 NEUROMUSCULAR REEDUCATION: CPT | Performed by: PHYSICAL THERAPIST

## 2019-02-18 NOTE — PROGRESS NOTES
Daily Note     Today's date: 2019  Patient name: Tanda Bosworth  : 1950  MRN: [id number]  Referring provider: Marian Price MD  Dx:   Encounter Diagnosis     ICD-10-CM    1  Achilles tendon disorder, left M67 972        Start Time:   Stop Time:   Total time in clinic (min): 50 minutes    Subjective: reports she was on her feet a lot and pain is increased; pain that is severe is in the L lateral calf and back of the heel      Objective: See treatment diary below      Assessment: Exhibits tenderness L peroneus longus/ brevis muscle belly and tendon at lateral ankle  Pain/ comparable sign w/ fib head mobs  Hypomobility L fib head  Trial of L tape for post fib head glide  Tolerated treatment well  Reported feeling better but not pain-free at end of session  Issued written hep  Patient would benefit from continued PT      Plan: Continue per plan of care         Precautions: none  POC expires: 19  Daily Treatment Diary     Manual           PROM L knee  JR           Mobs fib head   JR JR         TrP release L lateral gastroc  attempted JR JR peroneus         K-tape achilles             Low dye tape  JR  Post glide fib head         TrP release L HS   JR          biomech eval   JR              Exercise Diary           Neutral arch   10x 10x         Great toe raise   10x 10x         Mini squat w/ neutral arch   10x 15x         Fem n  glide             Sciatic n sliders  15x  15x         AROM L knee flexion             Eccentric heel lowering  10x 20x 30x         SLS on foam  B 10" x10  10x10" ea         CC: soleus deceleration  2BL2 20x 2BL2 20x 2BL2 20x         Lunge bosu   nv 10x10"                                                                                                                              Recumbent bike  attempted Hold               Modalities              CP prn

## 2019-02-20 ENCOUNTER — OFFICE VISIT (OUTPATIENT)
Dept: PHYSICAL THERAPY | Facility: CLINIC | Age: 69
End: 2019-02-20
Payer: COMMERCIAL

## 2019-02-20 DIAGNOSIS — M67.972 ACHILLES TENDON DISORDER, LEFT: Primary | ICD-10-CM

## 2019-02-20 PROCEDURE — 97140 MANUAL THERAPY 1/> REGIONS: CPT | Performed by: PHYSICAL THERAPIST

## 2019-02-20 PROCEDURE — 97112 NEUROMUSCULAR REEDUCATION: CPT | Performed by: PHYSICAL THERAPIST

## 2019-02-20 NOTE — PROGRESS NOTES
Daily Note     Today's date: 2019  Patient name: Johan Pineda  : 1950  MRN: [id number]  Referring provider: Scott Fuentes MD  Dx:   Encounter Diagnosis     ICD-10-CM    1  Achilles tendon disorder, left M67 972        Start Time:   Stop Time: 823  Total time in clinic (min): 52 minutes    Subjective: reports no changes but does states that her pain might me a little less      Objective: See treatment diary below      Assessment: TrP's L peroneus brevis/ longus, hypomobility fib head; pain w/ anterior fib glide  Trial of K-tape L peroneus and IASTM lateral lower leg and Achilles  Symptoms aggravated by step ups / step downs - some pain reduction w/ these activities p manuals/ taping  Tolerated treatment well  Patient would benefit from continued PT      Plan: Continue per plan of care         Precautions: none  POC expires: 19  Daily Treatment Diary     Manual          tib-fem mobs      nv        PROM L knee  JR           Mobs fib head   JR JR JR        IASTM achilles & lat calf     JR        TrP release L lateral gastroc  attempted JR JR peroneus JR peroneal        K-tape peroneals     JR        Low dye tape  JR  Post glide fib head Ant glide fib head         TrP rls L HS   JR          biomech eval   JR              Exercise Diary          Neutral arch   10x 10x hep        Great toe raise   10x 10x hep        Mini squat w/ neutral arch   10x 15x 15x        Fem n  glide             Sciatic n sliders  15x  15x         AROM L knee flexion             Eccentric heel lowering  10x 20x 30x 30x        SLS on foam  B 10" x10  10x10" ea 10x10"        CC: soleus deceleration  2BL2 20x 2BL2 20x 2BL2 20x 2BL2 20x        Lunge bosu   nv 10x10" 10x10"                                                                                                                             Recumbent bike  attempted Hold               Modalities              CP prn

## 2019-02-25 ENCOUNTER — OFFICE VISIT (OUTPATIENT)
Dept: PHYSICAL THERAPY | Facility: CLINIC | Age: 69
End: 2019-02-25
Payer: COMMERCIAL

## 2019-02-25 DIAGNOSIS — M67.972 ACHILLES TENDON DISORDER, LEFT: Primary | ICD-10-CM

## 2019-02-25 PROCEDURE — 97112 NEUROMUSCULAR REEDUCATION: CPT | Performed by: PHYSICAL THERAPIST

## 2019-02-25 PROCEDURE — 97140 MANUAL THERAPY 1/> REGIONS: CPT | Performed by: PHYSICAL THERAPIST

## 2019-02-25 NOTE — PROGRESS NOTES
Daily Note     Today's date: 2019  Patient name: Camila Horn  : 1950  MRN: [id number]  Referring provider: Jaxson Busby MD  Dx:   Encounter Diagnosis     ICD-10-CM    1  Achilles tendon disorder, left M67 972        Start Time:   Stop Time:   Total time in clinic (min): 41 minutes    Subjective: pan lateral L knee; tape seemed to help - pain didn't seem as severe; easier getting leg in/out of car today     Objective: See treatment diary below      Assessment: Tolerated treatment well  Decreased pain at end of session  Balance improving, Patient would benefit from continued PT      Plan: Continue per plan of care         Precautions: none  POC expires: 19  Daily Treatment Diary     Manual         tib-fem mobs      nv        PROM L knee  JR           Mobs fib head   JR JR JR        IASTM achilles & lat calf     JR        TrP release L lateral gastroc  attempted JR JR peroneus JR peroneal JR peroneal       K-tape peroneals     JR JR       Low dye tape  JR  Post glide fib head Ant glide fib head  Post glide fib head       TrP rls L HS   JR          biomech eval   JR              Exercise Diary         Neutral arch   10x 10x hep        Great toe raise   10x 10x hep        Mini squat w/ neutral arch   10x 15x 15x 15x       Fem n  glide             Sciatic n sliders  15x  15x  hep       AROM L knee flexion             Eccentric heel lowering  10x 20x 30x 30x 30x       SLS on foam  B 10" x10  10x10" ea 10x10" 10x10"       CC: soleus deceleration  2BL2 20x 2BL2 20x 2BL2 20x 2BL2 20x 2BL2 20x       Lunge bosu   nv 10x10" 10x10" 10x10"                                                                                                                            Recumbent bike  attempted Hold               Modalities              CP prn

## 2019-02-27 ENCOUNTER — OFFICE VISIT (OUTPATIENT)
Dept: PHYSICAL THERAPY | Facility: CLINIC | Age: 69
End: 2019-02-27
Payer: COMMERCIAL

## 2019-02-27 DIAGNOSIS — M67.972 ACHILLES TENDON DISORDER, LEFT: Primary | ICD-10-CM

## 2019-02-27 PROCEDURE — 97140 MANUAL THERAPY 1/> REGIONS: CPT | Performed by: PHYSICAL THERAPIST

## 2019-02-27 PROCEDURE — 97112 NEUROMUSCULAR REEDUCATION: CPT | Performed by: PHYSICAL THERAPIST

## 2019-02-27 NOTE — PROGRESS NOTES
Daily Note     Today's date: 2019  Patient name: Fiorella Speaker  : 1950  MRN: [id number]  Referring provider: Chilo Liu MD  Dx:   Encounter Diagnosis     ICD-10-CM    1  Achilles tendon disorder, left M67 972        Start Time:   Stop Time: 819  Total time in clinic (min): 45 minutes    Subjective:  Pain is gradually decreasing; states that she doesn't have too much pain now- feels better in the morning than at the end of the day; less pain / difficulty lifting leg into car      Objective: See treatment diary below      Assessment: Tolerated treatment well  Pain w/ over-pressure L knee flexion, improved but not abolished w/ manuals  Decreased tenderness lateral lower leg  Patient would benefit from continued PT      Plan: Continue per plan of care         Precautions: none  POC expires: 19  Daily Treatment Diary     Manual        tib-fem post glide     nv  JR      PROM L knee  JR     JR      Mobs fib head   FW XB VS  ML      IASTM achilles & lat calf     JR        TrP release L lateral gastroc  attempted JR JR peroneus JR peroneal JR peroneal JR peroneal      K-tape peroneals     JR JR JR      Low dye tape  JR  Post glide fib head Ant glide fib head  Post glide fib head Post glide      TrP rls L HS   JR          biomech eval   JR              Exercise Diary        Neutral arch   10x 10x hep -- -- -- -- -   Great toe raise   10x 10x hep        Mini squat w/ neutral arch   10x 15x 15x 15x 15x5"      Fem n  glide             Sciatic n sliders  15x  15x  hep       AROM L knee flexion             Eccentric heel lowering  10x 20x 30x 30x 30x 30      SLS on foam  B 10" x10  10x10" ea 10x10" 10x10" 10x10"      CC: soleus deceleration  2BL2 20x 2BL2 20x 2BL2 20x 2BL2 20x 2BL2 20x 30      Lunge bosu   nv 10x10" 10x10" 10x10" 10x10" Recumbent bike  attempted Hold               Modalities              CP prn

## 2019-03-04 ENCOUNTER — OFFICE VISIT (OUTPATIENT)
Dept: PHYSICAL THERAPY | Facility: CLINIC | Age: 69
End: 2019-03-04
Payer: COMMERCIAL

## 2019-03-04 DIAGNOSIS — M67.972 ACHILLES TENDON DISORDER, LEFT: Primary | ICD-10-CM

## 2019-03-04 PROCEDURE — 97140 MANUAL THERAPY 1/> REGIONS: CPT | Performed by: PHYSICAL THERAPIST

## 2019-03-04 PROCEDURE — 97110 THERAPEUTIC EXERCISES: CPT | Performed by: PHYSICAL THERAPIST

## 2019-03-04 NOTE — PROGRESS NOTES
Daily Note     Today's date: 3/4/2019  Patient name: Brooklyn Fofana  : 1950  MRN: [id number]  Referring provider: Sg Nicholson MD  Dx:   Encounter Diagnosis     ICD-10-CM    1  Achilles tendon disorder, left M67 972        Start Time: 180  Stop Time: 1900  Total time in clinic (min): 55 minutes    Subjective: Achy lateral L knee region and heel       Objective: See treatment diary below      Assessment: Tolerated treatment well  Still painful w/ over-pressure L knee flexion, tender lateral lower leg; added prone quad stretch  Patient would benefit from continued PT      Plan: Continue per plan of care         Precautions: none  POC expires: 19  Daily Treatment Diary     Manual  1/21 2/4 2/6 2/18 2/20 2/25 2/27 3/4     tib-fem post glide     nv  JR JR     PROM L knee  JR     JR JR     Mobs fib head   [place]     IASTM achilles & lat calf     JR        TrP release L lateral gastroc  attempted JR JR peroneus JR peroneal JR peroneal JR peroneal JR     K-tape peroneals     JR JR JR JR     Low dye tape  JR  Post glide fib head Ant glide fib head  Post glide fib head Post glide JR     TrP rls L HS   JR          biomech eval   JR              Exercise Diary   2 3     Neutral arch   10x 10x hep -- -- -- -- -   Great toe raise   10x 10x hep        Mini squat w/ neutral arch   10x 15x 15x 15x 15x5" 15x5"     Fem n  glide             Sciatic n sliders  15x  15x  hep       AROM L knee flexion             Eccentric heel lowering  10x 20x 30x 30x 30x 30 15x3     SLS on foam  B 10" x10  10x10" ea 10x10" 10x10" 10x10" 10x10"     CC: soleus deceleration  2BL2 20x 2BL2 20x 2BL2 20x 2BL2 20x 2BL2 20x 30 2BL2 30x     Lunge bosu   nv 10x10" 10x10" 10x10" 10x10" 15x10"     Prone quad w/strap        30"x3                                                                                                             Recumbent bike  attempted Hold               Modalities              CP prn

## 2019-03-06 ENCOUNTER — OFFICE VISIT (OUTPATIENT)
Dept: PHYSICAL THERAPY | Facility: CLINIC | Age: 69
End: 2019-03-06
Payer: COMMERCIAL

## 2019-03-06 DIAGNOSIS — M67.972 ACHILLES TENDON DISORDER, LEFT: Primary | ICD-10-CM

## 2019-03-06 PROCEDURE — 97112 NEUROMUSCULAR REEDUCATION: CPT | Performed by: PHYSICAL THERAPIST

## 2019-03-06 PROCEDURE — 97140 MANUAL THERAPY 1/> REGIONS: CPT | Performed by: PHYSICAL THERAPIST

## 2019-03-06 PROCEDURE — 97110 THERAPEUTIC EXERCISES: CPT | Performed by: PHYSICAL THERAPIST

## 2019-03-06 NOTE — PROGRESS NOTES
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      Objective: See treatment diary below      Assessment: Tolerated treatment well  Increased pain-free L knee flexion, decreased tenderness lateral calf  Patient would benefit from continued PT      Plan: Continue per plan of care   RA next week      Precautions: none  POC expires: 3/18/19  Daily Treatment Diary     Manual  1/21 2/4 2/6 2/18 2/20 2/25 2/27 3/4 3/6    tib-fem post glide     nv  JR JR JR    PROM L knee  ME     ZS XC WZ    Mobs fib head   [place]    IASTM achilles & lat calf     JR        TrP release L lateral gastroc  attempted JR JR peroneus JR peroneal JR peroneal JR peroneal JR JR    K-tape peroneals     JR JR JR JR JR    Low dye tape  JR  Post glide fib head Ant glide fib head  Post glide fib head Post glide JR JR    TrP rls L HS   JR          biomech eval   JR              Exercise Diary   2 2/6 2/18 2/20 2/25 2/27 3/4 3/6    Neutral arch   10x 10x hep -- -- -- -- -   Great toe raise   10x 10x hep        Mini squat w/ neutral arch   10x 15x 15x 15x 15x5" 15x5" 15x5"    Fem n  glide             Sciatic n sliders  15x  15x  hep       AROM L knee flexion             Eccentric heel lowering  10x 20x 30x 30x 30x 30 15x3 15x3    SLS on foam  B 10" x10  10x10" ea 10x10" 10x10" 10x10" 10x10" 10x10"    CC: soleus deceleration  2BL2 20x 2BL2 20x 2BL2 20x 2BL2 20x 2BL2 20x 30 2BL2 30x 2BL2 30x    Lunge bosu   nv 10x10" 10x10" 10x10" 10x10" 15x10" 15x10"    Prone quad w/strap        30"x3 30"x3    Stand HS curl 15x2#                                                                                               Recumbent bike  attempted Hold               Modalities              CP prn

## 2019-03-11 ENCOUNTER — OFFICE VISIT (OUTPATIENT)
Dept: FAMILY MEDICINE CLINIC | Facility: CLINIC | Age: 69
End: 2019-03-11
Payer: COMMERCIAL

## 2019-03-11 ENCOUNTER — OFFICE VISIT (OUTPATIENT)
Dept: PHYSICAL THERAPY | Facility: CLINIC | Age: 69
End: 2019-03-11
Payer: COMMERCIAL

## 2019-03-11 VITALS
HEIGHT: 60 IN | BODY MASS INDEX: 24.94 KG/M2 | WEIGHT: 127 LBS | HEART RATE: 71 BPM | OXYGEN SATURATION: 97 % | SYSTOLIC BLOOD PRESSURE: 124 MMHG | DIASTOLIC BLOOD PRESSURE: 80 MMHG

## 2019-03-11 DIAGNOSIS — M67.972 ACHILLES TENDON DISORDER, LEFT: Primary | ICD-10-CM

## 2019-03-11 DIAGNOSIS — B37.3 VAGINAL YEAST INFECTION: Primary | ICD-10-CM

## 2019-03-11 PROCEDURE — 97112 NEUROMUSCULAR REEDUCATION: CPT | Performed by: PHYSICAL THERAPIST

## 2019-03-11 PROCEDURE — 99213 OFFICE O/P EST LOW 20 MIN: CPT | Performed by: NURSE PRACTITIONER

## 2019-03-11 PROCEDURE — 3008F BODY MASS INDEX DOCD: CPT | Performed by: NURSE PRACTITIONER

## 2019-03-11 PROCEDURE — 97140 MANUAL THERAPY 1/> REGIONS: CPT | Performed by: PHYSICAL THERAPIST

## 2019-03-11 PROCEDURE — 1036F TOBACCO NON-USER: CPT | Performed by: NURSE PRACTITIONER

## 2019-03-11 PROCEDURE — 1160F RVW MEDS BY RX/DR IN RCRD: CPT | Performed by: NURSE PRACTITIONER

## 2019-03-11 RX ORDER — FLUCONAZOLE 150 MG/1
150 TABLET ORAL ONCE
Qty: 2 TABLET | Refills: 0 | Status: SHIPPED | OUTPATIENT
Start: 2019-03-11 | End: 2019-03-11

## 2019-03-11 NOTE — PROGRESS NOTES
[facility]        NAME: Tanda Bosworth is a 76 y o  female  : 1950    MRN: [id number]  DATE: 2019  TIME: 11:33 AM    Assessment and Plan   Vaginal yeast infection [B37 3]  1  Vaginal yeast infection  fluconazole (DIFLUCAN) 150 mg tablet         Patient Instructions     Patient Instructions   Diflucan as directed  Call if symptoms do not improve  Call/return with any problems or concerns        Vulvovaginal Candidiasis   AMBULATORY CARE:   Vulvovaginal candidiasis,  or yeast infection, is a common vaginal infection  Vulvovaginal candidiasis is caused by a fungus, or yeast-like germ  Fungi are normally found in your vagina  When there are too many fungi, it can cause an infection  Seek care immediately if:   · You have fever and chills  · You are bleeding from your vagina and it is not your monthly period  · You develop abdominal or pelvic pain  Contact your healthcare provider if:   · Your signs and symptoms get worse, even after treatment  · You have questions or concerns about your condition or care  Signs and symptoms:   · Thick, white, cheese-like discharge from your vagina    · Itching, swelling, or redness in your vagina    · Burning when you urinate  Treatment for vulvovaginal candidiasis  includes medicines to treat the fungal infection and decrease inflammation  The medicine may be a pill, topical cream, or vaginal suppository  Manage your symptoms:   · Wear cotton underwear  · Keep the vaginal area clean and dry  · Do not have sex until your symptoms are gone  · Do not douche  · Do not use feminine hygiene sprays, powders, or bubble bath  Prevent another infection:   · Take showers instead of baths  · Eat yogurt  · Limit the amount of alcohol you drink  · Control your blood sugar if you are diabetic  · Limit your time in hot tubs    Follow up with your healthcare provider as directed:  Write down your questions so you remember to ask them during your visits  © 2017 [address] Information is for End User's use only and may not be sold, redistributed or otherwise used for commercial purposes  All illustrations and images included in CareNotes® are the copyrighted property of [employer] M [employer] Inc  or [employer] Qu[employer]  The above information is an  only  It is not intended as medical advice for individual conditions or treatments  Talk to your doctor, nurse or pharmacist before following any medical regimen to see if it is safe and effective for you  Chief Complaint     Chief Complaint   Patient presents with    Vaginal Itching     for a few months         History of Present Illness       Vaginal itching on and off for a few months  Using OTC Vagisil cream which gives her temporary relief  Review of Systems   Review of Systems   Constitutional: Negative  HENT: Negative  Respiratory: Negative  Negative for shortness of breath  Cardiovascular: Negative  Negative for chest pain  Gastrointestinal: Negative  Negative for abdominal distention and abdominal pain  Genitourinary: Negative for difficulty urinating, dysuria, flank pain, frequency, hematuria, pelvic pain, urgency, vaginal bleeding, vaginal discharge and vaginal pain  Skin: Positive for rash (vaginal itching)  Psychiatric/Behavioral: Negative  Current Medications       Current Outpatient Medications:     fluconazole (DIFLUCAN) 150 mg tablet, Take 1 tablet (150 mg total) by mouth once for 1 dose Take one tablet today  Repeat in 1 week , Disp: 2 tablet, Rfl: 0    Current Allergies     Allergies as of 03/11/2019    (No Known Allergies)            The following portions of the patient's history were reviewed and updated as appropriate: allergies, current medications, past family history, past medical history, past social history, past surgical history and problem list      History reviewed   No pertinent past medical history  Past Surgical History:   Procedure Laterality Date    CHOLECYSTECTOMY         Family History   Problem Relation Age of Onset   Rochelle Puga Breast cancer Mother     Lung cancer Mother     Diabetes Mother     Substance Abuse Neg Hx     Mental illness Neg Hx          Medications have been verified  Objective   /80   Pulse 71   Ht 5' (1 524 m)   Wt 57 6 kg (127 lb)   SpO2 97%   BMI 24 80 kg/m²        Physical Exam     Physical Exam   Constitutional: She is oriented to person, place, and time  Vital signs are normal  She appears well-developed and well-nourished  She is active and cooperative  No distress  Eyes: EOM are normal    Cardiovascular: Normal rate, regular rhythm and normal heart sounds  No murmur heard  Pulmonary/Chest: Effort normal and breath sounds normal  No respiratory distress  She has no wheezes  Genitourinary: There is no tenderness on the right labia  There is no tenderness on the left labia  There is erythema in the vagina  No tenderness or bleeding in the vagina  Vaginal discharge (small amount of white dischage noted in vagina  No odor) found  Genitourinary Comments: Mild swelling and erythema to labia   Neurological: She is alert and oriented to person, place, and time  Skin: Skin is warm and dry  No rash noted  She is not diaphoretic  No erythema  Psychiatric: She has a normal mood and affect  Her behavior is normal  Judgment and thought content normal    Nursing note and vitals reviewed

## 2019-03-11 NOTE — PROGRESS NOTES
PT Re-Evaluation  and PT Discharge    Today's date: 3/11/2019  Patient name: Fiorella Rosado  : 1950  MRN: [id number]  Referring provider: Perry Wan MD  Dx:   Encounter Diagnosis     ICD-10-CM    1  Achilles tendon disorder, left M67 972        Start Time:   Stop Time: 1850  Total time in clinic (min): 51 minutes    Assessment/Plan  This patient demonstrates improvement since beginning therapy and has met most of the LTG's which were set for her regarding the Achilles issue  She continues to have complaints of L knee pain/ dysfunction and is advised to see her physician or orthopedic specialist for further work up re: knee injury  She is independent with HEP and is ready for DC to self-directed program         STGs: 4 weeks  1  Reduce tenderness in calf and HS mm - MET  2  Decrease pain 3 levels - MET  3  Restore full pain-free ROM L knee (equal to R knee) - MET           LTGs: 6-8 weeks  1  Independent HEP - MET  2  Able to perform HR and SLS LLE without pain - MET  3  Increase FOTO score to predicted level - MET  4  Able to stand at work without pain >2/10 - partially met   5  Able to lift leg into car without pain - partially met          Subjective   Took off from work the last 4 days - was out of town  Notes increased lateral knee pain, aggravated by bending the knee; Achilles doesn't feel bad   Not quite as bad lifting leg in/out of car    Knee pain is gone upon awakening, worsens as the day progresses  Has obtained OTC shoe inserts  Patients goal: to eliminate some of the pain - partially met    Objective  Pain scale: 0-7/10 L knee; 0-4/10 L Achilles  Gait: WNL  Lumbar screen: WNL  Palpation: no longer tender L achilles, some tenderness gastroc/soleus and peroneus longus/ brevis but decreased since IE  Neural tension tests: (-)  Functional strength tests:  SLS: able to maintain balance on either LE  > 30+ sec without pain  HR: able to perform > 10 reps LLE without pain      left     Hip   AROM  PROM  Strength  flexion   5/5   extension   5/5   Abduction   5/5   adduction   5/5   ER      IR      Knee   AROM  PROM  Strength  Flexion 140* 145* 5/5   Extension WNL WNL 5/5*   Ankle   AROM  PROM  Strength  DF 15  5/5   PF 40     Inversion 40  5/5   Eversion 10  5/5   PKB: L 130 degrees; R 125 degrees  * comparable sign  right extremity ROM / strength WFL        Precautions: none  POC expires: 3/18/19  Daily Treatment Diary     Manual  1/21 2/4 2/6 2/18 2/20 2/25 2/27 3/4 3/6 3/11   tib-fem post glide     nv  JR JR JR JR   PROM L knee  LF     FJ BT OA TX   Mobs fib head   [place]   IASTM achilles & lat calf     JR        TrP release L lateral gastroc  attempted JR JR peroneus JR peroneal JR peroneal JR peroneal JR JR    K-tape peroneals     JR JR JR JR JR    Low dye tape  JR  Post glide fib head Ant glide fib head  Post glide fib head Post glide JR JR    TrP rls L HS   JR          biomech eval   JR              Exercise Diary  1/21 2/4 2/6 2/18 2/20 2/25 2/27 3/4 3/6 3/11   Neutral arch   10x 10x hep -- -- -- -- -   Great toe raise   10x 10x hep        Mini squat w/ neutral arch   10x 15x 15x 15x 15x5" 15x5" 15x5" 15x5"   Fem n  glide             Sciatic n sliders  15x  15x  hep       AROM L knee flexion             Eccentric heel lowering  10x 20x 30x 30x 30x 30 15x3 15x3 15x3   SLS on foam  B 10" x10  10x10" ea 10x10" 10x10" 10x10" 10x10" 10x10" 10x10"   CC: soleus deceleration  2BL2 20x 2BL2 20x 2BL2 20x 2BL2 20x 2BL2 20x 30 2BL2 30x 2BL2 30x 2BL2 30x   Lunge bosu   nv 10x10" 10x10" 10x10" 10x10" 15x10" 15x10"    Prone quad w/strap        30"x3 30"x3    Stand HS curl         15x2#                                                                                               Recumbent bike  attempted Hold               Modalities              CP prn

## 2019-03-11 NOTE — PATIENT INSTRUCTIONS
Diflucan as directed  Call if symptoms do not improve  Call/return with any problems or concerns        Vulvovaginal Candidiasis   AMBULATORY CARE:   Vulvovaginal candidiasis,  or yeast infection, is a common vaginal infection  Vulvovaginal candidiasis is caused by a fungus, or yeast-like germ  Fungi are normally found in your vagina  When there are too many fungi, it can cause an infection  Seek care immediately if:   · You have fever and chills  · You are bleeding from your vagina and it is not your monthly period  · You develop abdominal or pelvic pain  Contact your healthcare provider if:   · Your signs and symptoms get worse, even after treatment  · You have questions or concerns about your condition or care  Signs and symptoms:   · Thick, white, cheese-like discharge from your vagina    · Itching, swelling, or redness in your vagina    · Burning when you urinate  Treatment for vulvovaginal candidiasis  includes medicines to treat the fungal infection and decrease inflammation  The medicine may be a pill, topical cream, or vaginal suppository  Manage your symptoms:   · Wear cotton underwear  · Keep the vaginal area clean and dry  · Do not have sex until your symptoms are gone  · Do not douche  · Do not use feminine hygiene sprays, powders, or bubble bath  Prevent another infection:   · Take showers instead of baths  · Eat yogurt  · Limit the amount of alcohol you drink  · Control your blood sugar if you are diabetic  · Limit your time in hot tubs  Follow up with your healthcare provider as directed:  Write down your questions so you remember to ask them during your visits  © 2017 [address] Information is for End User's use only and may not be sold, redistributed or otherwise used for commercial purposes  All illustrations and images included in CareNotes® are the copyrighted property of [employer] M [employer]  or Jonathan Quiroz    The above information is an  only  It is not intended as medical advice for individual conditions or treatments  Talk to your doctor, nurse or pharmacist before following any medical regimen to see if it is safe and effective for you

## 2019-03-13 ENCOUNTER — APPOINTMENT (OUTPATIENT)
Dept: PHYSICAL THERAPY | Facility: CLINIC | Age: 69
End: 2019-03-13
Payer: COMMERCIAL

## 2019-03-28 ENCOUNTER — TELEPHONE (OUTPATIENT)
Dept: FAMILY MEDICINE CLINIC | Facility: CLINIC | Age: 69
End: 2019-03-28

## 2019-07-02 DIAGNOSIS — Z12.31 ENCOUNTER FOR SCREENING MAMMOGRAM FOR BREAST CANCER: Primary | ICD-10-CM

## 2019-07-15 ENCOUNTER — OFFICE VISIT (OUTPATIENT)
Dept: FAMILY MEDICINE CLINIC | Facility: CLINIC | Age: 69
End: 2019-07-15
Payer: COMMERCIAL

## 2019-07-15 VITALS
SYSTOLIC BLOOD PRESSURE: 120 MMHG | TEMPERATURE: 96.7 F | HEIGHT: 60 IN | WEIGHT: 126.8 LBS | RESPIRATION RATE: 22 BRPM | BODY MASS INDEX: 24.9 KG/M2 | OXYGEN SATURATION: 98 % | HEART RATE: 69 BPM | DIASTOLIC BLOOD PRESSURE: 70 MMHG

## 2019-07-15 DIAGNOSIS — N89.8 VAGINAL ITCHING: ICD-10-CM

## 2019-07-15 DIAGNOSIS — B37.3 VAGINAL YEAST INFECTION: Primary | ICD-10-CM

## 2019-07-15 PROCEDURE — 99213 OFFICE O/P EST LOW 20 MIN: CPT | Performed by: NURSE PRACTITIONER

## 2019-07-15 PROCEDURE — 3008F BODY MASS INDEX DOCD: CPT | Performed by: NURSE PRACTITIONER

## 2019-07-15 RX ORDER — FLUCONAZOLE 150 MG/1
TABLET ORAL
Qty: 2 TABLET | Refills: 0 | Status: SHIPPED | OUTPATIENT
Start: 2019-07-15 | End: 2019-07-22

## 2019-07-15 NOTE — PROGRESS NOTES
[facility]        NAME: Alexx Kim is a 71 y o  female  : 1950    MRN: [id number]  DATE: July 15, 2019  TIME: 11:05 AM    Assessment and Plan   Vaginal yeast infection [B37 3]  1  Vaginal yeast infection  fluconazole (DIFLUCAN) 150 mg tablet   2  Vaginal itching  fluconazole (DIFLUCAN) 150 mg tablet         Patient Instructions     Patient Instructions   Diflucan as directed  Call if symptoms do not improve  Call/return with any problems or concerns          Chief Complaint     Chief Complaint   Patient presents with    Vaginal Itching     irritation for couple months, patient states that she was seen for the same issue back on 3/11/19, medication help for some time but symptoms came back after a while  History of Present Illness       Vaginal itch x 2 months  Was treated for a yeast infection in March and given Diflucan which did help with symptoms but symptoms returned  Using OTC creams with little relief  Review of Systems   Review of Systems   Constitutional: Negative for activity change and fever  Respiratory: Negative for shortness of breath  Cardiovascular: Negative for chest pain and palpitations  Genitourinary: Positive for vaginal discharge  Negative for difficulty urinating, hematuria, pelvic pain, vaginal bleeding and vaginal pain  Vaginal itching         Current Medications       Current Outpatient Medications:     fluconazole (DIFLUCAN) 150 mg tablet, Take 1 tablet today  Repeat in 1 week , Disp: 2 tablet, Rfl: 0    Current Allergies     Allergies as of 07/15/2019    (No Known Allergies)            The following portions of the patient's history were reviewed and updated as appropriate: allergies, current medications, past family history, past medical history, past social history, past surgical history and problem list      History reviewed  No pertinent past medical history      Past Surgical History:   Procedure Laterality Date    CHOLECYSTECTOMY      GALLBLADDER SURGERY         Family History   Problem Relation Age of Onset   Kelsey Quintana Breast cancer Mother     Lung cancer Mother     Diabetes Mother     Substance Abuse Neg Hx     Mental illness Neg Hx          Medications have been verified  Objective   /70 (BP Location: Left arm, Patient Position: Sitting, Cuff Size: Standard)   Pulse 69   Temp (!) 96 7 °F (35 9 °C) (Tympanic)   Resp 22   Ht 4' 11 84" (1 52 m)   Wt 57 5 kg (126 lb 12 8 oz)   LMP  (LMP Unknown)   SpO2 98%   Breastfeeding? No   BMI 24 89 kg/m²        Physical Exam     Physical Exam   Constitutional: She is oriented to person, place, and time  Vital signs are normal  She appears well-developed and well-nourished  She is active and cooperative  No distress  Eyes: EOM are normal    Cardiovascular: Normal rate, regular rhythm and normal heart sounds  No murmur heard  Pulmonary/Chest: Effort normal and breath sounds normal  No respiratory distress  She has no wheezes  Genitourinary: Vaginal discharge (minimal whitish discharge noted no odor, mild swelling and erythema to labia ) found  Neurological: She is alert and oriented to person, place, and time  Skin: Skin is warm and dry  No rash noted  She is not diaphoretic  No erythema  Psychiatric: She has a normal mood and affect  Her behavior is normal  Judgment and thought content normal    Nursing note and vitals reviewed

## 2019-07-17 LAB
A VAGINAE DNA VAG QL NAA+PROBE: NORMAL SCORE
BVAB2 DNA VAG QL NAA+PROBE: NORMAL SCORE
C ALBICANS DNA VAG QL NAA+PROBE: NEGATIVE
C GLABRATA DNA VAG QL NAA+PROBE: NEGATIVE
C TRACH RRNA SPEC QL NAA+PROBE: NEGATIVE
MEGA1 DNA VAG QL NAA+PROBE: NORMAL SCORE
N GONORRHOEA RRNA SPEC QL NAA+PROBE: NEGATIVE
T VAGINALIS RRNA SPEC QL NAA+PROBE: NEGATIVE

## 2019-07-22 ENCOUNTER — TELEPHONE (OUTPATIENT)
Dept: FAMILY MEDICINE CLINIC | Facility: CLINIC | Age: 69
End: 2019-07-22

## 2019-07-22 ENCOUNTER — HOSPITAL ENCOUNTER (OUTPATIENT)
Dept: MAMMOGRAPHY | Facility: CLINIC | Age: 69
Discharge: HOME | End: 2019-07-22
Payer: COMMERCIAL

## 2019-07-22 VITALS — WEIGHT: 126 LBS | HEIGHT: 60 IN | BODY MASS INDEX: 24.74 KG/M2

## 2019-07-22 DIAGNOSIS — Z12.31 ENCOUNTER FOR SCREENING MAMMOGRAM FOR BREAST CANCER: ICD-10-CM

## 2019-07-22 PROCEDURE — 77067 SCR MAMMO BI INCL CAD: CPT

## 2019-07-22 NOTE — TELEPHONE ENCOUNTER
----- Message from [address] [place] [address],[address] sent at 7/22/2019  2:08 PM EDT -----  Call with results  Mammogram normal  Routine screening in 1 year  Thank you

## 2019-07-26 ENCOUNTER — TELEPHONE (OUTPATIENT)
Dept: FAMILY MEDICINE CLINIC | Facility: CLINIC | Age: 69
End: 2019-07-26

## 2019-07-26 NOTE — TELEPHONE ENCOUNTER
Pt called states the vaginal itching is severe has not improved at all, would like to know if there is anything else she can take? Looks like you gave her diflucan few weeks ago

## 2019-08-29 ENCOUNTER — OFFICE VISIT (OUTPATIENT)
Dept: OBGYN CLINIC | Facility: CLINIC | Age: 69
End: 2019-08-29
Payer: COMMERCIAL

## 2019-08-29 VITALS — DIASTOLIC BLOOD PRESSURE: 80 MMHG | SYSTOLIC BLOOD PRESSURE: 126 MMHG | WEIGHT: 127.6 LBS | BODY MASS INDEX: 25.34 KG/M2

## 2019-08-29 DIAGNOSIS — L90.0 LICHEN SCLEROSUS ET ATROPHICUS: Primary | ICD-10-CM

## 2019-08-29 PROCEDURE — 99202 OFFICE O/P NEW SF 15 MIN: CPT | Performed by: OBSTETRICS & GYNECOLOGY

## 2019-08-29 RX ORDER — CLOBETASOL PROPIONATE 0.5 MG/G
CREAM TOPICAL
Qty: 30 G | Refills: 0 | Status: SHIPPED | OUTPATIENT
Start: 2019-08-29 | End: 2019-10-14 | Stop reason: SDUPTHER

## 2019-08-29 NOTE — PROGRESS NOTES
Assessment/Plan:  Patient advised that the appearance is most consistent with lichen sclerosis  We discussed the entity for a few minutes and she will be prescribed clobetasol to be used daily until her symptoms subside  She is to be seen back in 1 month for follow-up as if there are no significant changes, a vulvar biopsy to rule out any other entity will be performed at that time  Diagnoses and all orders for this visit:    Lichen sclerosus et atrophicus  -     clobetasol (TEMOVATE) 0 05 % cream; Apply daily to affected area till symptoms resolved  Then apply twice weekly          Subjective:     Patient ID: Rod Orellana is a 71 y o  female  Lyell Leap is here today with a several month history of vulvar itching  The patient denies any change in her usual hygiene products, soaps, lotions, detergents etc   She has tried several over-the-counter products which failed to resolve this  Apparently she was given 2 doses of Diflucan with the presumption that this was yeast oriented  This also failed to resolve this  She is here today to see what might be occurring  The patient's background significant only for psoriasis for which she takes triamcinolone cream       Review of Systems  vulvar pruritus which is extensive including the perianal area    Objective:     Physical Exam  exam of the vulva shows widespread white patches throughout the clitoral, vulvar, perineal, and perianal regions  There are some areas of excoriation secondary to self-induced itching  Speculum exam shows atrophic vaginal tissue but no signs of vaginal discharge    Cervix is atrophic in grossly normal

## 2019-08-29 NOTE — LETTER
August 29, 2019     Aj Flood MD  [address] [address]    Patient: Augustine Bunch   YOB: 1950   Date of Visit: 8/29/2019       Dear Dr Ana Foreman:    Thank you for referring Renzo Marroquin to me for evaluation  Below are my notes for this consultation  If you have questions, please do not hesitate to call me  I look forward to following your patient along with you  Sincerely,        Joseluis Teran MD        CC: No Recipients  Joseluis Teran MD  8/29/2019  3:13 PM  Sign at close encounter  Assessment/Plan:  Patient advised that the appearance is most consistent with lichen sclerosis  We discussed the entity for a few minutes and she will be prescribed clobetasol to be used daily until her symptoms subside  She is to be seen back in 1 month for follow-up as if there are no significant changes, a vulvar biopsy to rule out any other entity will be performed at that time  Diagnoses and all orders for this visit:    Lichen sclerosus et atrophicus  -     clobetasol (TEMOVATE) 0 05 % cream; Apply daily to affected area till symptoms resolved  Then apply twice weekly          Subjective:     Patient ID: Augustine Bunch is a 71 y o  female  Lauren Pick is here today with a several month history of vulvar itching  The patient denies any change in her usual hygiene products, soaps, lotions, detergents etc   She has tried several over-the-counter products which failed to resolve this  Apparently she was given 2 doses of Diflucan with the presumption that this was yeast oriented  This also failed to resolve this  She is here today to see what might be occurring    The patient's background significant only for psoriasis for which she takes triamcinolone cream       Review of Systems  vulvar pruritus which is extensive including the perianal area    Objective:     Physical Exam  exam of the vulva shows widespread white patches throughout the clitoral, vulvar, perineal, and perianal regions  There are some areas of excoriation secondary to self-induced itching  Speculum exam shows atrophic vaginal tissue but no signs of vaginal discharge    Cervix is atrophic in grossly normal

## 2019-09-27 ENCOUNTER — OFFICE VISIT (OUTPATIENT)
Dept: FAMILY MEDICINE CLINIC | Facility: CLINIC | Age: 69
End: 2019-09-27
Payer: COMMERCIAL

## 2019-09-27 VITALS
BODY MASS INDEX: 24.94 KG/M2 | SYSTOLIC BLOOD PRESSURE: 130 MMHG | OXYGEN SATURATION: 97 % | HEART RATE: 83 BPM | WEIGHT: 127 LBS | DIASTOLIC BLOOD PRESSURE: 72 MMHG | HEIGHT: 60 IN

## 2019-09-27 DIAGNOSIS — J01.00 ACUTE MAXILLARY SINUSITIS, RECURRENCE NOT SPECIFIED: Primary | ICD-10-CM

## 2019-09-27 PROCEDURE — 99213 OFFICE O/P EST LOW 20 MIN: CPT | Performed by: NURSE PRACTITIONER

## 2019-09-27 PROCEDURE — 3008F BODY MASS INDEX DOCD: CPT | Performed by: NURSE PRACTITIONER

## 2019-09-27 RX ORDER — AMOXICILLIN AND CLAVULANATE POTASSIUM 875; 125 MG/1; MG/1
1 TABLET, FILM COATED ORAL EVERY 12 HOURS SCHEDULED
Qty: 20 TABLET | Refills: 0 | Status: SHIPPED | OUTPATIENT
Start: 2019-09-27 | End: 2019-10-07

## 2019-09-27 RX ORDER — PREDNISONE 20 MG/1
TABLET ORAL
Qty: 15 TABLET | Refills: 0 | Status: SHIPPED | OUTPATIENT
Start: 2019-09-27 | End: 2020-10-09

## 2019-09-27 NOTE — PATIENT INSTRUCTIONS
Discussed viral vs bacterial infection  RX as ordered  Continue OTC cough/cold medications as directed  Call or return for problems/concerns

## 2019-09-27 NOTE — PROGRESS NOTES
[facility]        NAME: Eric Colunga is a 71 y o  female  : 1950    MRN: [id number]  DATE: 2019  TIME: 3:30 PM    Assessment and Plan   Acute maxillary sinusitis, recurrence not specified [J01 00]  1  Acute maxillary sinusitis, recurrence not specified  amoxicillin-clavulanate (AUGMENTIN) 875-125 mg per tablet    predniSONE 20 mg tablet         Patient Instructions     Patient Instructions   Discussed viral vs bacterial infection  RX as ordered  Continue OTC cough/cold medications as directed  Call or return for problems/concerns            Chief Complaint     Chief Complaint   Patient presents with    Earache     x 2weeks    Sinus Pressure     x 2weeks         History of Present Illness       Sinus pain and pressure x 2 weeks  Taking OTC medications with little relief  Symptoms are not improving      Review of Systems   Review of Systems   Constitutional: Positive for fatigue and fever  Negative for activity change and chills  HENT: Positive for congestion, ear pain, postnasal drip, rhinorrhea, sinus pressure, sinus pain and sore throat  Eyes: Negative for pain, discharge and redness  Respiratory: Positive for cough  Negative for wheezing  Cardiovascular: Negative for chest pain  Gastrointestinal: Negative for constipation, diarrhea, nausea and vomiting  Musculoskeletal: Negative for myalgias  Skin: Negative for rash  Neurological: Positive for headaches  Negative for dizziness  Current Medications       Current Outpatient Medications:     amoxicillin-clavulanate (AUGMENTIN) 875-125 mg per tablet, Take 1 tablet by mouth every 12 (twelve) hours for 10 days, Disp: 20 tablet, Rfl: 0    clobetasol (TEMOVATE) 0 05 % cream, Apply daily to affected area till symptoms resolved   Then apply twice weekly, Disp: 30 g, Rfl: 0    predniSONE 20 mg tablet, 20 mg po bid x 5 days, 20 mg daily po x 5, Disp: 15 tablet, Rfl: 0    Current Allergies Allergies as of 09/27/2019    (No Known Allergies)            The following portions of the patient's history were reviewed and updated as appropriate: allergies, current medications, past family history, past medical history, past social history, past surgical history and problem list      History reviewed  No pertinent past medical history  Past Surgical History:   Procedure Laterality Date    CHOLECYSTECTOMY      GALLBLADDER SURGERY         Family History   Problem Relation Age of Onset    Breast cancer Mother 46    Lung cancer Mother     Diabetes Mother     Prostate cancer Brother     No Known Problems Paternal Aunt     Substance Abuse Neg Hx     Mental illness Neg Hx          Medications have been verified  Objective   /72   Pulse 83   Ht 5' (1 524 m)   Wt 57 6 kg (127 lb)   LMP  (LMP Unknown)   SpO2 97%   BMI 24 80 kg/m²        Physical Exam     Physical Exam   Constitutional: She is oriented to person, place, and time  She appears well-developed and well-nourished  No distress  HENT:   Head: Normocephalic and atraumatic  Right Ear: Tympanic membrane, external ear and ear canal normal    Left Ear: Tympanic membrane, external ear and ear canal normal    Nose: Rhinorrhea present  No epistaxis  Right sinus exhibits maxillary sinus tenderness  Left sinus exhibits maxillary sinus tenderness  Mouth/Throat: Uvula is midline, oropharynx is clear and moist and mucous membranes are normal    Cardiovascular: Normal rate, regular rhythm and normal heart sounds  Exam reveals no gallop and no friction rub  No murmur heard  Pulmonary/Chest: Effort normal and breath sounds normal  No respiratory distress  She has no wheezes  She has no rales  Abdominal: She exhibits no distension  Musculoskeletal: Normal range of motion  Neurological: She is alert and oriented to person, place, and time  Skin: She is not diaphoretic  Psychiatric: She has a normal mood and affect   Her behavior is normal  Judgment and thought content normal    Nursing note and vitals reviewed

## 2019-10-03 ENCOUNTER — OFFICE VISIT (OUTPATIENT)
Dept: OBGYN CLINIC | Facility: CLINIC | Age: 69
End: 2019-10-03
Payer: COMMERCIAL

## 2019-10-03 VITALS — BODY MASS INDEX: 24.69 KG/M2 | SYSTOLIC BLOOD PRESSURE: 120 MMHG | DIASTOLIC BLOOD PRESSURE: 74 MMHG | WEIGHT: 126.4 LBS

## 2019-10-03 DIAGNOSIS — N90.89 VULVAR LESION: Primary | ICD-10-CM

## 2019-10-03 PROCEDURE — 56605 BIOPSY OF VULVA/PERINEUM: CPT | Performed by: OBSTETRICS & GYNECOLOGY

## 2019-10-03 PROCEDURE — 88312 SPECIAL STAINS GROUP 1: CPT | Performed by: PATHOLOGY

## 2019-10-03 PROCEDURE — 88305 TISSUE EXAM BY PATHOLOGIST: CPT | Performed by: PATHOLOGY

## 2019-10-14 ENCOUNTER — TELEPHONE (OUTPATIENT)
Dept: OBGYN CLINIC | Facility: CLINIC | Age: 69
End: 2019-10-14

## 2019-10-14 DIAGNOSIS — L90.0 LICHEN SCLEROSUS ET ATROPHICUS: ICD-10-CM

## 2019-10-14 RX ORDER — CLOBETASOL PROPIONATE 0.5 MG/G
CREAM TOPICAL
Qty: 30 G | Refills: 2 | Status: SHIPPED | OUTPATIENT
Start: 2019-10-14 | End: 2019-10-16 | Stop reason: SDUPTHER

## 2019-10-14 NOTE — TELEPHONE ENCOUNTER
----- Message from Le Macedo MD sent at 10/14/2019 11:09 AM EDT -----    I would have her use daily till the symptoms subside, and then go to once per week

## 2019-10-14 NOTE — TELEPHONE ENCOUNTER
----- Message from Jonna Sanabria MD sent at 10/14/2019 10:04 AM EDT -----   Please let patient know her biopsy confirms the diagnosis of lichen sclerosis

## 2019-10-16 DIAGNOSIS — L90.0 LICHEN SCLEROSUS ET ATROPHICUS: ICD-10-CM

## 2019-10-16 RX ORDER — CLOBETASOL PROPIONATE 0.5 MG/G
CREAM TOPICAL
Qty: 30 G | Refills: 2 | Status: SHIPPED | OUTPATIENT
Start: 2019-10-16 | End: 2020-10-09

## 2019-10-28 ENCOUNTER — TELEPHONE (OUTPATIENT)
Dept: OBGYN CLINIC | Facility: CLINIC | Age: 69
End: 2019-10-28

## 2019-10-28 NOTE — TELEPHONE ENCOUNTER
Patient called to report that she received a mailing from the office which contained her Rx for Clobetasol cream but also contained an Rx for oral contraceptives for another patient, [facility]  Patient wanted to alert the office and wanted to know what to do with the Rx that was not for her  Advised patient to please destroy the unneeded Rx, apologized on behalf of the office  Assured patient that the Rx in her name was correct and per the doctor's orders  Manager Harjit tim

## 2020-08-07 ENCOUNTER — TRANSCRIBE ORDERS (OUTPATIENT)
Dept: ADMINISTRATIVE | Facility: HOSPITAL | Age: 70
End: 2020-08-07

## 2020-08-07 DIAGNOSIS — Z12.39 SCREENING FOR BREAST CANCER: Primary | ICD-10-CM

## 2020-08-07 DIAGNOSIS — Z12.31 VISIT FOR SCREENING MAMMOGRAM: Primary | ICD-10-CM

## 2020-09-03 ENCOUNTER — HOSPITAL ENCOUNTER (OUTPATIENT)
Dept: MAMMOGRAPHY | Facility: CLINIC | Age: 70
Discharge: HOME | End: 2020-09-03
Payer: COMMERCIAL

## 2020-09-03 VITALS — HEIGHT: 60 IN | WEIGHT: 126 LBS | BODY MASS INDEX: 24.74 KG/M2

## 2020-09-03 DIAGNOSIS — Z12.31 VISIT FOR SCREENING MAMMOGRAM: ICD-10-CM

## 2020-09-03 PROCEDURE — 77063 BREAST TOMOSYNTHESIS BI: CPT

## 2020-09-03 PROCEDURE — 77067 SCR MAMMO BI INCL CAD: CPT

## 2020-10-09 ENCOUNTER — OFFICE VISIT (OUTPATIENT)
Dept: FAMILY MEDICINE CLINIC | Facility: CLINIC | Age: 70
End: 2020-10-09
Payer: MEDICARE

## 2020-10-09 VITALS
TEMPERATURE: 98.2 F | WEIGHT: 135.6 LBS | BODY MASS INDEX: 25.6 KG/M2 | SYSTOLIC BLOOD PRESSURE: 124 MMHG | HEART RATE: 77 BPM | HEIGHT: 61 IN | DIASTOLIC BLOOD PRESSURE: 82 MMHG | OXYGEN SATURATION: 97 %

## 2020-10-09 DIAGNOSIS — K29.00 ACUTE GASTRITIS WITHOUT HEMORRHAGE, UNSPECIFIED GASTRITIS TYPE: ICD-10-CM

## 2020-10-09 DIAGNOSIS — E78.00 HYPERCHOLESTEREMIA: ICD-10-CM

## 2020-10-09 DIAGNOSIS — R10.32 LEFT LOWER QUADRANT ABDOMINAL PAIN: ICD-10-CM

## 2020-10-09 DIAGNOSIS — Z00.00 MEDICARE ANNUAL WELLNESS VISIT, INITIAL: Primary | ICD-10-CM

## 2020-10-09 DIAGNOSIS — Z23 NEED FOR INFLUENZA VACCINATION: ICD-10-CM

## 2020-10-09 PROBLEM — M19.90 ARTHRITIS: Status: ACTIVE | Noted: 2020-10-09

## 2020-10-09 PROCEDURE — 90662 IIV NO PRSV INCREASED AG IM: CPT

## 2020-10-09 PROCEDURE — 1160F RVW MEDS BY RX/DR IN RCRD: CPT | Performed by: FAMILY MEDICINE

## 2020-10-09 PROCEDURE — G0008 ADMIN INFLUENZA VIRUS VAC: HCPCS

## 2020-10-09 PROCEDURE — 3725F SCREEN DEPRESSION PERFORMED: CPT | Performed by: FAMILY MEDICINE

## 2020-10-09 PROCEDURE — 1125F AMNT PAIN NOTED PAIN PRSNT: CPT | Performed by: FAMILY MEDICINE

## 2020-10-09 PROCEDURE — 99214 OFFICE O/P EST MOD 30 MIN: CPT | Performed by: FAMILY MEDICINE

## 2020-10-09 PROCEDURE — G0402 INITIAL PREVENTIVE EXAM: HCPCS | Performed by: FAMILY MEDICINE

## 2020-10-09 PROCEDURE — 1036F TOBACCO NON-USER: CPT | Performed by: FAMILY MEDICINE

## 2020-10-09 PROCEDURE — 96372 THER/PROPH/DIAG INJ SC/IM: CPT | Performed by: FAMILY MEDICINE

## 2020-10-09 PROCEDURE — 1170F FXNL STATUS ASSESSED: CPT | Performed by: FAMILY MEDICINE

## 2020-10-13 LAB
ALBUMIN SERPL-MCNC: 3.6 G/DL (ref 3.6–5.1)
ALBUMIN/GLOB SERPL: 1.3 (CALC) (ref 1–2.5)
ALP SERPL-CCNC: 60 U/L (ref 37–153)
ALT SERPL-CCNC: 16 U/L (ref 6–29)
AST SERPL-CCNC: 18 U/L (ref 10–35)
BASOPHILS # BLD AUTO: 61 CELLS/UL (ref 0–200)
BASOPHILS NFR BLD AUTO: 1.1 %
BILIRUB SERPL-MCNC: 1 MG/DL (ref 0.2–1.2)
BUN SERPL-MCNC: 12 MG/DL (ref 7–25)
BUN/CREAT SERPL: 21 (CALC) (ref 6–22)
CALCIUM SERPL-MCNC: 8.9 MG/DL (ref 8.6–10.4)
CHLORIDE SERPL-SCNC: 106 MMOL/L (ref 98–110)
CHOLEST SERPL-MCNC: 247 MG/DL
CHOLEST/HDLC SERPL: 4 (CALC)
CO2 SERPL-SCNC: 26 MMOL/L (ref 20–32)
CREAT SERPL-MCNC: 0.57 MG/DL (ref 0.6–0.93)
CRP SERPL-MCNC: 4.2 MG/L
EOSINOPHIL # BLD AUTO: 275 CELLS/UL (ref 15–500)
EOSINOPHIL NFR BLD AUTO: 5 %
ERYTHROCYTE [DISTWIDTH] IN BLOOD BY AUTOMATED COUNT: 12.4 % (ref 11–15)
GLOBULIN SER CALC-MCNC: 2.7 G/DL (CALC) (ref 1.9–3.7)
GLUCOSE SERPL-MCNC: 86 MG/DL (ref 65–99)
HCT VFR BLD AUTO: 38.5 % (ref 35–45)
HDLC SERPL-MCNC: 62 MG/DL
HGB BLD-MCNC: 12.9 G/DL (ref 11.7–15.5)
LDLC SERPL CALC-MCNC: 159 MG/DL (CALC)
LYMPHOCYTES # BLD AUTO: 1375 CELLS/UL (ref 850–3900)
LYMPHOCYTES NFR BLD AUTO: 25 %
MCH RBC QN AUTO: 31.5 PG (ref 27–33)
MCHC RBC AUTO-ENTMCNC: 33.5 G/DL (ref 32–36)
MCV RBC AUTO: 93.9 FL (ref 80–100)
MONOCYTES # BLD AUTO: 517 CELLS/UL (ref 200–950)
MONOCYTES NFR BLD AUTO: 9.4 %
NEUTROPHILS # BLD AUTO: 3273 CELLS/UL (ref 1500–7800)
NEUTROPHILS NFR BLD AUTO: 59.5 %
NONHDLC SERPL-MCNC: 185 MG/DL (CALC)
PLATELET # BLD AUTO: 290 THOUSAND/UL (ref 140–400)
PMV BLD REES-ECKER: 10.1 FL (ref 7.5–12.5)
POTASSIUM SERPL-SCNC: 4 MMOL/L (ref 3.5–5.3)
PROT SERPL-MCNC: 6.3 G/DL (ref 6.1–8.1)
RBC # BLD AUTO: 4.1 MILLION/UL (ref 3.8–5.1)
SL AMB EGFR AFRICAN AMERICAN: 109 ML/MIN/1.73M2
SL AMB EGFR NON AFRICAN AMERICAN: 94 ML/MIN/1.73M2
SODIUM SERPL-SCNC: 138 MMOL/L (ref 135–146)
TRIGL SERPL-MCNC: 139 MG/DL
TSH SERPL-ACNC: 1.7 MIU/L (ref 0.4–4.5)
WBC # BLD AUTO: 5.5 THOUSAND/UL (ref 3.8–10.8)

## 2020-10-14 ENCOUNTER — TELEPHONE (OUTPATIENT)
Dept: FAMILY MEDICINE CLINIC | Facility: CLINIC | Age: 70
End: 2020-10-14

## 2020-10-27 ENCOUNTER — TELEPHONE (OUTPATIENT)
Dept: FAMILY MEDICINE CLINIC | Facility: CLINIC | Age: 70
End: 2020-10-27

## 2021-01-05 ENCOUNTER — OFFICE VISIT (OUTPATIENT)
Dept: FAMILY MEDICINE CLINIC | Facility: CLINIC | Age: 71
End: 2021-01-05
Payer: MEDICARE

## 2021-01-05 VITALS
RESPIRATION RATE: 16 BRPM | TEMPERATURE: 98.1 F | DIASTOLIC BLOOD PRESSURE: 78 MMHG | WEIGHT: 138 LBS | HEIGHT: 60 IN | SYSTOLIC BLOOD PRESSURE: 136 MMHG | HEART RATE: 84 BPM | BODY MASS INDEX: 27.09 KG/M2 | OXYGEN SATURATION: 97 %

## 2021-01-05 DIAGNOSIS — K21.9 GASTROESOPHAGEAL REFLUX DISEASE WITHOUT ESOPHAGITIS: Primary | ICD-10-CM

## 2021-01-05 PROCEDURE — 99213 OFFICE O/P EST LOW 20 MIN: CPT | Performed by: FAMILY MEDICINE

## 2021-01-05 RX ORDER — FAMOTIDINE 40 MG/1
40 TABLET, FILM COATED ORAL DAILY
Qty: 30 TABLET | Refills: 1 | Status: SHIPPED | OUTPATIENT
Start: 2021-01-05 | End: 2021-03-25 | Stop reason: SDUPTHER

## 2021-01-05 NOTE — PATIENT INSTRUCTIONS
Finish out the omeprazole you have once daily, then go to famotidine 40 mg daily for 3-4 weeks  If symptoms are well controlled, try going to half a tablet-20 mg for the next 1-2 weeks and then try off  If symptoms are not controlled on the famotidine or return very soon after decreasing dosing, I would have you see GI for possible upper endoscopy

## 2021-01-05 NOTE — PROGRESS NOTES
[address]         NAME: Jesus Manuel Reyes is a 79 y o  female  : 1950    MRN: [id number]  DATE: 2021  TIME: 4:09 PM    Assessment and Plan   Gastroesophageal reflux disease without esophagitis [K21 9]  1  Gastroesophageal reflux disease without esophagitis  famotidine (PEPCID) 40 MG tablet       No problem-specific Assessment & Plan notes found for this encounter  Patient Instructions     Patient Instructions   Rosario Lion out the omeprazole you have once daily, then go to famotidine 40 mg daily for 3-4 weeks  If symptoms are well controlled, try going to half a tablet-20 mg for the next 1-2 weeks and then try off  If symptoms are not controlled on the famotidine or return very soon after decreasing dosing, I would have you see GI for possible upper endoscopy  Chief Complaint     Chief Complaint   Patient presents with    Follow-up     Stomach problem- burping after eating         History of Present Illness       Patient follow-up on gastritis/GERD symptoms  Had been taking omeprazole which control symptoms very well  When she stop taking symptoms return  Another trial once again control symptoms but when stop symptoms return  No solid-food dysphagia  No vomiting  No blood noted by mouth or rectally  No dark bowel movements      Review of Systems   Review of Systems   Constitutional: Negative for appetite change, chills, diaphoresis, fatigue and fever  Respiratory: Negative for cough, choking and shortness of breath  Cardiovascular: Negative for chest pain and palpitations  Gastrointestinal: Positive for abdominal pain  Negative for abdominal distention, blood in stool, constipation, diarrhea, nausea, rectal pain and vomiting  Genitourinary: Negative for dysuria, flank pain and frequency  Neurological: Negative for dizziness and light-headedness           Current Medications       Current Outpatient Medications:     famotidine (PEPCID) 40 MG tablet, Take 1 tablet (40 mg total) by mouth daily, Disp: 30 tablet, Rfl: 1    Current Allergies     Allergies as of 01/05/2021    (No Known Allergies)            The following portions of the patient's history were reviewed and updated as appropriate: allergies, current medications, past family history, past medical history, past social history, past surgical history and problem list      History reviewed  No pertinent past medical history  Past Surgical History:   Procedure Laterality Date    CHOLECYSTECTOMY      GALLBLADDER SURGERY         Family History   Problem Relation Age of Onset    Breast cancer Mother 46    Lung cancer Mother     Diabetes Mother     Prostate cancer Brother     No Known Problems Paternal Aunt     No Known Problems Father     No Known Problems Maternal Grandmother     No Known Problems Maternal Grandfather     No Known Problems Paternal Grandmother     No Known Problems Paternal Grandfather     Substance Abuse Neg Hx     Mental illness Neg Hx          Medications have been verified  Objective   /78   Pulse 84   Temp 98 1 °F (36 7 °C)   Resp 16   Ht 5' (1 524 m)   Wt 62 6 kg (138 lb)   LMP  (LMP Unknown)   SpO2 97%   BMI 26 95 kg/m²        Physical Exam     Physical Exam  Constitutional:       General: She is not in acute distress  Appearance: Normal appearance  She is not ill-appearing  HENT:      Head: Normocephalic and atraumatic  Nose: Nose normal    Pulmonary:      Effort: Pulmonary effort is normal  No respiratory distress  Abdominal:      General: There is no distension  Palpations: Abdomen is soft  Tenderness: There is no abdominal tenderness  There is no guarding or rebound  Neurological:      Mental Status: She is alert

## 2021-03-03 DIAGNOSIS — Z23 ENCOUNTER FOR IMMUNIZATION: ICD-10-CM

## 2021-03-07 ENCOUNTER — IMMUNIZATIONS (OUTPATIENT)
Dept: FAMILY MEDICINE CLINIC | Facility: HOSPITAL | Age: 71
End: 2021-03-07

## 2021-03-07 DIAGNOSIS — Z23 ENCOUNTER FOR IMMUNIZATION: Primary | ICD-10-CM

## 2021-03-07 PROCEDURE — 0001A SARS-COV-2 / COVID-19 MRNA VACCINE (PFIZER-BIONTECH) 30 MCG: CPT

## 2021-03-07 PROCEDURE — 91300 SARS-COV-2 / COVID-19 MRNA VACCINE (PFIZER-BIONTECH) 30 MCG: CPT

## 2021-03-14 ENCOUNTER — OFFICE VISIT (OUTPATIENT)
Dept: URGENT CARE | Facility: CLINIC | Age: 71
End: 2021-03-14
Payer: MEDICARE

## 2021-03-14 VITALS — DIASTOLIC BLOOD PRESSURE: 66 MMHG | HEART RATE: 79 BPM | SYSTOLIC BLOOD PRESSURE: 146 MMHG | TEMPERATURE: 98.1 F

## 2021-03-14 DIAGNOSIS — J01.40 ACUTE PANSINUSITIS, RECURRENCE NOT SPECIFIED: Primary | ICD-10-CM

## 2021-03-14 PROCEDURE — 99213 OFFICE O/P EST LOW 20 MIN: CPT | Performed by: PHYSICIAN ASSISTANT

## 2021-03-14 PROCEDURE — G0463 HOSPITAL OUTPT CLINIC VISIT: HCPCS | Performed by: PHYSICIAN ASSISTANT

## 2021-03-14 RX ORDER — AMOXICILLIN AND CLAVULANATE POTASSIUM 875; 125 MG/1; MG/1
1 TABLET, FILM COATED ORAL EVERY 12 HOURS SCHEDULED
Qty: 14 TABLET | Refills: 0 | Status: SHIPPED | OUTPATIENT
Start: 2021-03-14 | End: 2021-03-21

## 2021-03-14 NOTE — PATIENT INSTRUCTIONS
Drink plenty of fluids  Take antibiotics as prescribed  Can take mucinex over the counter to help loosen mucous  Make sure you are drinking plenty of fluids to prevent worsening symptoms or dry mouth  Antibiotics can take up to 48 hours before you and started to notice improvement in symptoms  Follow up with family doctor if symptoms not improving after a few days  Sinusitis, Ambulatory Care   GENERAL INFORMATION:   Sinusitis  is inflammation or infection of your sinuses  It is most often caused by a virus  Acute sinusitis may last up to 12 weeks  Chronic sinusitis lasts longer than 12 weeks  Recurrent sinusitis is when you have 3 or more episodes of sinusitis in 1 year  Common symptoms include the following:   · Fever    · Pain, pressure, redness, or swelling around the forehead, cheeks, or eyes    · Thick yellow or green discharge from your nose    · Tenderness when you touch your face over your sinuses    · Dry cough that happens mostly at night or when you lie down    · Headache and face pain that is worse when you lean forward    · Teeth pain or pain when you chew  Seek immediate care for the following symptoms:   · Vision changes such as double vision    · Confusion or trouble thinking clearly    · Headache and stiff neck    · Trouble breathing  Treatment for sinusitis  may include medicines to relieve nasal and sinus congestion or to decrease pain and fever  Ask your healthcare provider which medicines you should take and how much is safe  Manage sinusitis:   · Drink liquids as directed  Ask your healthcare provider how much liquid to drink each day and which liquids are best for you  Liquids will help loosen and drain the mucus in your sinuses  · Breathe in steam   Heat a bowl of water until you see steam  Lean over the bowl and make a tent over your head with a large towel  Breathe deeply for about 20 minutes  Be careful not to get too close to the steam or burn yourself  Do this 3 times a day  You can also breathe deeply when you take a hot shower  · Rinse your sinuses  Use a sinus rinse device to rinse your nasal passages with a saline (salt water) solution  This will help thin the mucus in your nose and rinse away pollen and dirt  It will also help reduce swelling so you can breathe normally  Ask how often to do this  · Use heat on your sinuses  to decrease pain  Apply heat for 15 to 20 minutes every hour for as many days as directed  · Sleep with your head elevated  Place an extra pillow under your head before you go to sleep to help your sinuses drain  · Do not smoke and avoid secondhand smoke  If you smoke, it is never too late to quit  Ask for information about how to stop smoking if you need help  Prevent the spread of germs that cause sinusitis:  Wash your hands often with soap and water  Wash your hands after you use the bathroom, change a child's diaper, or sneeze  Wash your hands before you prepare or eat food  Follow up with your healthcare provider as directed:  Write down your questions so you remember to ask them during your visits  CARE AGREEMENT:   You have the right to help plan your care  Learn about your health condition and how it may be treated  Discuss treatment options with your caregivers to decide what care you want to receive  You always have the right to refuse treatment  The above information is an  only  It is not intended as medical advice for individual conditions or treatments  Talk to your doctor, nurse or pharmacist before following any medical regimen to see if it is safe and effective for you  © 2014 [address] is for End User's use only and may not be sold, redistributed or otherwise used for commercial purposes  All illustrations and images included in CareNotes® are the copyrighted property of [employer]  or Jonathan Quiroz

## 2021-03-18 NOTE — PROGRESS NOTES
[facility]        NAME: Shannan Hernandez is a 79 y o  female  : 1950    MRN: [id number]  DATE: 2021  TIME: 12:10 AM    Assessment and Plan   Acute pansinusitis, recurrence not specified [J01 40]  1  Acute pansinusitis, recurrence not specified  amoxicillin-clavulanate (AUGMENTIN) 875-125 mg per tablet         Patient Instructions       Follow up with PCP in 3-5 days  Proceed to  ER if symptoms worsen  Chief Complaint     Chief Complaint   Patient presents with    Facial Pain     Facial and neck pain, gum pressure and eye pressure, nasal inflammation/congestion x 2 weeks after airplane flight home from [facility]  Occassional dizziness, upper airway/throat "congestion", unable to clear, fatique  Denies post nasal drip, n/v/d/c/s, fever, chills  History of Present Illness         80-year-old female presents to the clinic with nasal congestion, facial pain, headaches, some pressure that started 2 weeks ago  Patient states that symptoms started after a flight home from [place]  Patient states she does get occasional dizziness and has to clear her throat often due to mucus  Patient also feels some fatigue  Patient denies fevers, chills, nausea, vomiting, diarrhea, cough, sore throat, postnasal drip  Review of Systems   Review of Systems   Constitutional: Positive for fatigue  Negative for chills and fever  HENT: Positive for congestion, sinus pressure and sinus pain  Negative for postnasal drip, rhinorrhea and sore throat  Respiratory: Negative for cough  Gastrointestinal: Negative for abdominal pain, diarrhea, nausea and vomiting  Musculoskeletal: Negative for myalgias  Neurological: Positive for dizziness and headaches  Negative for light-headedness           Current Medications       Current Outpatient Medications:     amoxicillin-clavulanate (AUGMENTIN) 875-125 mg per tablet, Take 1 tablet by mouth every 12 (twelve) hours for 7 days, Disp: 14 tablet, Rfl: 0    famotidine (PEPCID) 40 MG tablet, Take 1 tablet (40 mg total) by mouth daily, Disp: 30 tablet, Rfl: 1    Current Allergies     Allergies as of 03/14/2021    (No Known Allergies)            The following portions of the patient's history were reviewed and updated as appropriate: allergies, current medications, past family history, past medical history, past social history, past surgical history and problem list      Past Medical History:   Diagnosis Date    GERD (gastroesophageal reflux disease)     Sinus infection        Past Surgical History:   Procedure Laterality Date    CHOLECYSTECTOMY      GALLBLADDER SURGERY         Family History   Problem Relation Age of Onset    Breast cancer Mother 46    Lung cancer Mother     Diabetes Mother     Prostate cancer Brother     No Known Problems Paternal Aunt     No Known Problems Father     No Known Problems Maternal Grandmother     No Known Problems Maternal Grandfather     No Known Problems Paternal Grandmother     No Known Problems Paternal Grandfather     Substance Abuse Neg Hx     Mental illness Neg Hx          Medications have been verified  Objective   /66   Pulse 79   Temp 98 1 °F (36 7 °C)   LMP  (LMP Unknown)   No LMP recorded (lmp unknown)  Patient is postmenopausal        Physical Exam     Physical Exam  Vitals signs and nursing note reviewed  Constitutional:       General: She is not in acute distress  Appearance: She is well-developed  She is not diaphoretic  HENT:      Head: Normocephalic and atraumatic  Right Ear: A middle ear effusion is present  Tympanic membrane is not erythematous  Left Ear: A middle ear effusion is present  Tympanic membrane is not erythematous  Nose: Mucosal edema and congestion present  No rhinorrhea  Right Sinus: Maxillary sinus tenderness and frontal sinus tenderness present  Left Sinus: Maxillary sinus tenderness and frontal sinus tenderness present  Mouth/Throat:      Pharynx: Uvula midline  Posterior oropharyngeal erythema (PND) present  Cardiovascular:      Rate and Rhythm: Normal rate and regular rhythm  Pulmonary:      Effort: Pulmonary effort is normal       Breath sounds: Normal breath sounds  Musculoskeletal: Normal range of motion  Skin:     General: Skin is warm and dry  Findings: No rash  Neurological:      Mental Status: She is alert and oriented to person, place, and time

## 2021-03-25 ENCOUNTER — TELEPHONE (OUTPATIENT)
Dept: GASTROENTEROLOGY | Facility: CLINIC | Age: 71
End: 2021-03-25

## 2021-03-25 ENCOUNTER — CONSULT (OUTPATIENT)
Dept: GASTROENTEROLOGY | Facility: CLINIC | Age: 71
End: 2021-03-25
Payer: MEDICARE

## 2021-03-25 VITALS
WEIGHT: 145 LBS | DIASTOLIC BLOOD PRESSURE: 80 MMHG | HEIGHT: 61 IN | SYSTOLIC BLOOD PRESSURE: 120 MMHG | BODY MASS INDEX: 27.38 KG/M2

## 2021-03-25 DIAGNOSIS — Z12.11 COLON CANCER SCREENING: ICD-10-CM

## 2021-03-25 DIAGNOSIS — K21.9 GASTROESOPHAGEAL REFLUX DISEASE WITHOUT ESOPHAGITIS: Primary | ICD-10-CM

## 2021-03-25 DIAGNOSIS — R14.2 BELCHING: ICD-10-CM

## 2021-03-25 PROCEDURE — 99204 OFFICE O/P NEW MOD 45 MIN: CPT | Performed by: NURSE PRACTITIONER

## 2021-03-25 RX ORDER — OMEPRAZOLE 20 MG/1
20 CAPSULE, DELAYED RELEASE ORAL DAILY
COMMUNITY
End: 2021-03-25 | Stop reason: DRUGHIGH

## 2021-03-25 RX ORDER — PANTOPRAZOLE SODIUM 40 MG/1
40 TABLET, DELAYED RELEASE ORAL DAILY
Qty: 30 TABLET | Refills: 2 | Status: SHIPPED | OUTPATIENT
Start: 2021-03-25 | End: 2021-11-17

## 2021-03-25 RX ORDER — FAMOTIDINE 40 MG/1
40 TABLET, FILM COATED ORAL
Qty: 30 TABLET | Refills: 1 | Status: SHIPPED | OUTPATIENT
Start: 2021-03-25 | End: 2021-10-20 | Stop reason: SDUPTHER

## 2021-03-25 NOTE — PATIENT INSTRUCTIONS
Take the pantoprazole 40 mg in the morning 20-30 minutes before you eat or drink   take the famotidine 40 mg at bedtime  Continue to minimize fatty/fried foods, chocolate, caffeine, alcohol, NSAIDs ( ibuprofen /Motrin / Advil, Aleve /naproxen, full-dose aspirin)  Recommend smaller more frequent meals and avoid late-night eating ( within 2-3 hours of bed)  Consider elevating the head of your bed at night with blocks or a mattress wedge   try to use Tylenol or Tylenol arthritis formula  If you must use Advil, take a smaller dose with Tylenol and always take with food  complete the barium swallow as ordered    Gastroesophageal Reflux Disease   WHAT YOU NEED TO KNOW:   What is gastroesophageal reflux disease (GERD)? GERD is reflux that occurs more than twice a week for a few weeks  Reflux means acid and food in the stomach back up into the esophagus  It usually causes heartburn and other symptoms  GERD can cause other health problems over time if it is not treated  What causes GERD? GERD often occurs when the lower muscle (sphincter) of the esophagus does not close properly  The sphincter normally opens to let food into the stomach  It then closes to keep food and stomach acid in the stomach  If the sphincter does not close properly, stomach acid and food back up (reflux) into the esophagus  The following may increase your risk for GERD:  · Certain foods such as spicy foods, chocolate, foods that contain caffeine, peppermint, and fried foods    · Hiatal hernia    · Certain medicines such as calcium channel blockers (used to treat high blood pressure), allergy medicines, sedatives, or antidepressants    · Pregnancy or obesity    · Lying down after a meal    · Drinking alcohol or smoking    What are the signs and symptoms of GERD?    · Heartburn (burning pain in your chest)    · Pain after meals that spreads to your neck, jaw, or shoulder    · Pain that gets better when you change positions    · Bitter or acid taste in your mouth    · A dry cough    · Trouble swallowing or pain with swallowing    · Hoarseness or a sore throat    · Burping or hiccups    · Feeling full soon after you start eating    How is GERD diagnosed? Your healthcare provider will ask about your symptoms and when they started  Tell him or her about other medical conditions you have, your eating habits, and your activities  You may also need any of the following:  · The amount of acid  in your esophagus and stomach may be checked  A small probe is used to check the amount  · An endoscopy  is a procedure used to look at the inside of your esophagus and stomach  An endoscope is a bendable tube with a light and camera on the end  Your healthcare provider may remove a small sample of tissue and send it to a lab for tests  · X-ray  pictures may be taken of your stomach and intestines (bowel)  You may be given a chalky liquid to drink before the pictures are taken  This liquid helps your stomach and intestines show up better on the x-rays  · Pressure and function  tests of your esophagus can help find problems such as a hiatal hernia  How is GERD treated? · Medicines  are used to decrease stomach acid  Medicine may also be used to help your lower esophageal sphincter and stomach contract (tighten) more  · Surgery  is done to wrap the upper part of the stomach around the esophageal sphincter  This will strengthen the sphincter and prevent reflux  How can I manage GERD? · Do not have foods or drinks that may increase heartburn  These include chocolate, peppermint, fried or fatty foods, drinks that contain caffeine, or carbonated drinks (soda)  Other foods include spicy foods, onions, tomatoes, and tomato-based foods  Do not have foods or drinks that can irritate your esophagus, such as citrus fruits, juices, and alcohol  · Do not eat large meals  When you eat a lot of food at one time, your stomach needs more acid to digest it   Eat 6 small meals each day instead of 3 large ones, and eat slowly  Do not eat meals 2 to 3 hours before bedtime  · Elevate the head of your bed  Place 6-inch blocks under the head of your bed frame  You may also use more than one pillow under your head and shoulders while you sleep  · Maintain a healthy weight  If you are overweight, weight loss may help relieve symptoms of GERD  · Do not smoke  Smoking weakens the lower esophageal sphincter and increases the risk of GERD  Ask your healthcare provider for information if you currently smoke and need help to quit  E-cigarettes or smokeless tobacco still contain nicotine  Talk to your healthcare provider before you use these products  · Do not wear clothing that is tight around your waist   Tight clothing can put pressure on your stomach and cause or worsen GERD symptoms  Call your local emergency number (911 in the [address] [facility]) if:   · You have severe chest pain and sudden trouble breathing  When should I seek immediate care? · You have trouble breathing after you vomit  · You have trouble swallowing, or pain with swallowing  · Your bowel movements are black, bloody, or tarry-looking  · Your vomit looks like coffee grounds or has blood in it  When should I call my doctor? · You feel full and cannot burp or vomit  · You vomit large amounts, or you vomit often  · You are losing weight without trying  · Your symptoms get worse or do not improve with treatment  · You have questions or concerns about your condition or care  CARE AGREEMENT:   You have the right to help plan your care  Learn about your health condition and how it may be treated  Discuss treatment options with your healthcare providers to decide what care you want to receive  You always have the right to refuse treatment  The above information is an  only  It is not intended as medical advice for individual conditions or treatments   Talk to your doctor, nurse or pharmacist before following any medical regimen to see if it is safe and effective for you  © Copyright [address] Information is for End User's use only and may not be sold, redistributed or otherwise used for commercial purposes   All illustrations and images included in CareNotes® are the copyrighted property of [employer]  or [address] [place] [address]

## 2021-03-25 NOTE — PROGRESS NOTES
[facility] - Outpatient Consultation  Farideh Chavez 79 y o  female MRN: [id number]  Encounter: [id number]    ASSESSMENT AND PLAN:      1  Gastroesophageal reflux disease without esophagitis  2  Belching   6 month history of sub epigastric pain with eating, increased belching and burning in the chest with occasional liquid reflux  Differential considerations include PUD, gastritis, esophagitis although no overt risk factors with the exception of 20-25 lb weight gain since her [facility]  No improvement with famotidine per PCP  Some improvement with OTC omeprazole  Strongly advised EGD to evaluate her symptoms  The patient has a strong aversion to being "put to sleep" for any procedures  The procedure, light sedation, monitoring was explained to the patient in great detail  It was also explained to the patient that merely prescribing medications without testing to assess for the etiology of her symptoms was not advisable  She would like to think about this option but agreeable to barium swallow in the interim  -  Change famotidine (PEPCID) 40 MG tablet; Take 1 tablet (40 mg total) by mouth daily at bedtime  Dispense: 30 tablet; Refill: 1  - start pantoprazole (PROTONIX) 40 mg tablet; Take 1 tablet (40 mg total) by mouth daily  Dispense: 30 tablet; Refill: 2  -  Check [place] barium swallow ROUTINE; Future  - strongly advised recommendation of EGD the patient currently not agreeable after risks, benefits reviewed    3  Colon cancer screening   Average risk  The patient has never had a screening colonoscopy again, owing to her aversion to being "put to sleep" for procedures  She did have a fecal globin test in 2017 which was negative  Cologuard negative October, 2020 per PCP  Explained the risks, benefits of colonoscopy as the gold standard for evaluation, prevention of CRC  Currently not agreeable to proceed with screening colonoscopy      - strongly advised screening colonoscopy but if not, should continue with Cologuard every 3 years    Followup Appointment:  2 months  ______________________________________________________________________    Chief Complaint   Patient presents with    GERD       HPI:   Edson Sexton is a 79 y o  female who presents complaining of 6 months of significant esophageal reflux with increased belching  When she eats she experiences sub epigastric pain with every swallow  Worst with meats, things with sauces on numb but denies dysphagia, odynophagia, early satiety  She will awaken some nights with burning in her chest and liquid reflux in her throat  Tried famotidine without improvement  Some improvement with OTC Prilosec  Rare NSAID use but has taken it for the past couple weeks intermittently for leg pain, Baker cyst pain  Minimal caffeine, EtOH  Does acknowledge some late-night eating which may contribute to her symptoms  Denies nausea or vomiting  Having daily formed stools  Denies melena, hematochezia, lower abdominal or rectal pain  She does  aso acknowledge about a 20 lb weight gain since retiring     Appetite is normal     Historical Information   Past Medical History:   Diagnosis Date    Anxiety     Arthritis     Baker cyst, left     Cholelithiasis     Chronic headaches     GERD (gastroesophageal reflux disease)     Menorrhagia     Sinus infection      Past Surgical History:   Procedure Laterality Date    CHOLECYSTECTOMY      IR US ASPIRATION BAKERS CYST Left     LAPAROSCOPIC CHOLECYSTECTOMY       Social History     Substance and Sexual Activity   Alcohol Use Yes    Frequency: Monthly or less    Drinks per session: 1 or 2    Binge frequency: Never     Social History     Substance and Sexual Activity   Drug Use No     Social History     Tobacco Use   Smoking Status Former Smoker    Packs/day: 0 50    Years: 20 00    Pack years: 10 00    Types: Cigarettes    Quit date: 1999    Years since quittin 2 Smokeless Tobacco Never Used     Family History   Problem Relation Age of Onset    Breast cancer Mother 46    Lung cancer Mother     Diabetes Mother     Prostate cancer Brother     No Known Problems Paternal Aunt     Stroke Father     No Known Problems Maternal Grandmother     No Known Problems Maternal Grandfather     No Known Problems Paternal Grandmother     No Known Problems Paternal Grandfather     No Known Problems Brother     No Known Problems Brother     No Known Problems Brother     No Known Problems Brother     No Known Problems Brother     Substance Abuse Neg Hx     Mental illness Neg Hx     Colon cancer Neg Hx     Colon polyps Neg Hx        Meds/Allergies     Current Outpatient Medications:     famotidine (PEPCID) 40 MG tablet    pantoprazole (PROTONIX) 40 mg tablet    No Known Allergies    PHYSICAL EXAM:    Blood pressure 120/80, height 5' 1" (1 549 m), weight 65 8 kg (145 lb), not currently breastfeeding  Body mass index is 27 4 kg/m²  General Appearance: NAD, cooperative, alert  Head:  Normocephalic, atraumatic  Eyes: Anicteric, PERRLA, EOMI  ENT:  Normal external appearance, normal mucosa  Neck:  Supple, symmetrical, trachea midline,   Resp:  Clear to auscultation bilaterally; no rales, rhonchi or wheezing; respirations unlabored   CV:  S1 S2, Regular rate and rhythm; no murmur, rub, or gallop  GI:  Soft, non-tender, non-distended; normal bowel sounds; no masses, no organomegaly   Rectal: Deferred  Musculoskeletal: No cyanosis, clubbing or edema  Normal ROM  Skin:  No jaundice, rashes, or lesions   Heme/Lymph: No palpable cervical lymphadenopathy  Psych: Normal affect, good eye contact  Neuro: No gross deficits, AAOx3    Lab Results:  Reviewed recent lab work per PCP from October, 2020      Lab Results   Component Value Date    WBC 5 5 10/12/2020    HGB 12 9 10/12/2020    HCT 38 5 10/12/2020    MCV 93 9 10/12/2020     10/12/2020     Lab Results   Component Value Date     09/06/2017    K 4 0 10/12/2020     10/12/2020    CO2 26 10/12/2020    BUN 12 10/12/2020    CREATININE 0 57 (L) 10/12/2020    CALCIUM 8 9 10/12/2020    AST 18 10/12/2020    ALT 16 10/12/2020    ALKPHOS 60 10/12/2020    PROT 6 7 09/06/2017    BILITOT 1 0 09/06/2017     No results found for: IRON, TIBC, FERRITIN  No results found for: LIPASE    Radiology Results:   No results found  REVIEW OF SYSTEMS:    CONSTITUTIONAL: Denies any fever, chills, rigors, fatigue, weight loss  Endorses 20-25 lb weight gain since retiring  HEENT: No earache or tinnitus  Denies hearing loss or visual disturbances  CARDIOVASCULAR: No chest pain or palpitations  RESPIRATORY: Denies any cough, hemoptysis, shortness of breath or dyspnea on exertion  GASTROINTESTINAL: As noted in the History of Present Illness  GENITOURINARY: No problems with urination  Denies any hematuria or dysuria  NEUROLOGIC: No dizziness or vertigo, denies headaches  MUSCULOSKELETAL: Denies any muscle or joint pain  SKIN: Denies skin rashes or itching  ENDOCRINE: Denies excessive thirst  Denies intolerance to heat or cold  PSYCHOSOCIAL: Denies depression or anxiety  Denies any recent memory loss

## 2021-03-28 ENCOUNTER — IMMUNIZATIONS (OUTPATIENT)
Dept: FAMILY MEDICINE CLINIC | Facility: HOSPITAL | Age: 71
End: 2021-03-28

## 2021-03-28 DIAGNOSIS — Z23 ENCOUNTER FOR IMMUNIZATION: Primary | ICD-10-CM

## 2021-03-28 PROCEDURE — 91300 SARS-COV-2 / COVID-19 MRNA VACCINE (PFIZER-BIONTECH) 30 MCG: CPT

## 2021-03-28 PROCEDURE — 0002A SARS-COV-2 / COVID-19 MRNA VACCINE (PFIZER-BIONTECH) 30 MCG: CPT

## 2021-06-28 ENCOUNTER — OFFICE VISIT (OUTPATIENT)
Dept: GASTROENTEROLOGY | Facility: CLINIC | Age: 71
End: 2021-06-28
Payer: MEDICARE

## 2021-06-28 VITALS
HEIGHT: 60 IN | WEIGHT: 136 LBS | DIASTOLIC BLOOD PRESSURE: 94 MMHG | SYSTOLIC BLOOD PRESSURE: 148 MMHG | BODY MASS INDEX: 26.7 KG/M2 | HEART RATE: 84 BPM

## 2021-06-28 DIAGNOSIS — R14.2 BELCHING: ICD-10-CM

## 2021-06-28 DIAGNOSIS — Z12.11 SCREENING FOR COLON CANCER: ICD-10-CM

## 2021-06-28 DIAGNOSIS — K21.9 GASTROESOPHAGEAL REFLUX DISEASE WITHOUT ESOPHAGITIS: Primary | ICD-10-CM

## 2021-06-28 PROCEDURE — 99213 OFFICE O/P EST LOW 20 MIN: CPT | Performed by: INTERNAL MEDICINE

## 2021-06-28 NOTE — PROGRESS NOTES
[facility] - Outpatient Follow-up Note  Cathy Diop 70 y o  female MRN: [id number]  Encounter: [id number]    ASSESSMENT AND PLAN:      1  Gastroesophageal reflux disease without esophagitis  2  Belching    Barium swallow 04/2021 revealed small sliding hiatal hernia and patulous GE junction with gastroesophageal reflux  She stop taking pantoprazole and is having recurrent epigastric pain with eating certain foods and reflux symptoms with lying down after eating  Temporary relief with Tums which she is taking several times a day  -recommend patient resume pantoprazole 40 mg daily in the a m  and continue to take Tums as needed  She will complete her prescription pantoprazole that she has at home ( approximately 30 tablets) and then will try OTC omeprazole 20 mg daily   We discussed taking OTC omeprazole 40 mg if her symptoms worsen  -reviewed dietary and lifestyle modifications and written instructions provided  She has good understanding of her trigger foods and tries to avoid these  -if her symptoms worsen or are not relieved with PPI, would recommend proceeding to EGD  Patient is aware and will consider EGD if necessary        2 Screening for colon cancer  Cologviri approximately 1 year ago was negative  Recommend follow-up screening with colonoscopy or Cologuard 2023      Followup Appointment:  3 months  ______________________________________________________________________    Chief Complaint   Patient presents with    Follow up barium swallow     HPI:  Evaluated in the office in March with 6 month history of epigastric discomfort with eating certain foods and frequent belching consistent with GERD  She was started on pantoprazole 40 mg daily and instructed to continue famotidine 40 mg daily at bedtime  EGD was recommended however the patient was hesitant to proceed with an invasive procedure    Barium swallow was recommended and performed 04/06/2021 which showed small sliding hiatal hernia, patulous GE junction with GERD but no reflux esophagitis  She presents today for follow-up  Her symptoms were well controlled on  Pantoprazole 40 mg daily  She stopped the famotidine because she felt like it was not helping her  She also stopped the pantoprazole approximately 3 weeks ago because she was unsure if she should continue on it  Unfortunately, she has had recurrence of her symptoms with epigastric pain and frequent belching  She denies heartburn or liquid reflux but she does experience discomfort with lying down after eating  She is taking Tums 1-2 times per day on most days with temporary relief of her symptoms  She denies any nausea or vomit and no unintentional weight loss  No dysphagia  Her bowels are regular with no recent change  She denies melena or rectal bleeding    Cologuard approximately 1 year ago was negative    Historical Information   Past Medical History:   Diagnosis Date    Anxiety     Arthritis     Baker cyst, left     Cholelithiasis     Chronic headaches     GERD (gastroesophageal reflux disease)     Menorrhagia     Sinus infection      Past Surgical History:   Procedure Laterality Date    CHOLECYSTECTOMY      IR ASPIRATION BAKERS CYST Left     LAPAROSCOPIC CHOLECYSTECTOMY       Social History     Substance and Sexual Activity   Alcohol Use Yes     Social History     Substance and Sexual Activity   Drug Use No     Social History     Tobacco Use   Smoking Status Former Smoker    Packs/day: 0 50    Years: 20 00    Pack years: 10 00    Types: Cigarettes    Quit date: 1999    Years since quittin 5   Smokeless Tobacco Never Used     Family History   Problem Relation Age of Onset    Breast cancer Mother 46    Lung cancer Mother     Diabetes Mother     Prostate cancer Brother     No Known Problems Paternal [de-identified]     Stroke Father     No Known Problems Maternal Grandmother     No Known Problems Maternal Grandfather  No Known Problems Paternal Grandmother     No Known Problems Paternal Grandfather     No Known Problems Brother     No Known Problems Brother     No Known Problems Brother     No Known Problems Brother     No Known Problems Brother     Substance Abuse Neg Hx     Mental illness Neg Hx     Colon cancer Neg Hx     Colon polyps Neg Hx          Current Outpatient Medications:     pantoprazole (PROTONIX) 40 mg tablet    famotidine (PEPCID) 40 MG tablet  No Known Allergies  Reviewed medications and allergies and updated as indicated    PHYSICAL EXAM:    Blood pressure 148/94, pulse 84, height 5' (1 524 m), weight 61 7 kg (136 lb), not currently breastfeeding  Body mass index is 26 56 kg/m²  General Appearance: NAD, cooperative, alert  Eyes: Anicteric, PERRLA, EOMI  ENT:  Normocephalic, atraumatic, normal mucosa  Neck:  Supple, symmetrical, trachea midline  Resp:  Clear to auscultation bilaterally; no rales, rhonchi or wheezing; respirations unlabored   CV:  S1 S2, Regular rate and rhythm; no murmur, rub, or gallop  GI:  Soft, non-tender, non-distended; normal bowel sounds; no masses, no organomegaly   Rectal: Deferred  Musculoskeletal: No cyanosis, clubbing or edema  Normal ROM    Skin:  No jaundice, rashes, or lesions   Psych: Normal affect, good eye contact  Neuro: No gross deficits, AAOx3    Lab Results:   Lab Results   Component Value Date    WBC 5 5 10/12/2020    HGB 12 9 10/12/2020    HCT 38 5 10/12/2020    MCV 93 9 10/12/2020     10/12/2020     Lab Results   Component Value Date     09/06/2017    K 4 0 10/12/2020     10/12/2020    CO2 26 10/12/2020    BUN 12 10/12/2020    CREATININE 0 57 (L) 10/12/2020    CALCIUM 8 9 10/12/2020    AST 18 10/12/2020    ALT 16 10/12/2020    ALKPHOS 60 10/12/2020    PROT 6 7 09/06/2017    BILITOT 1 0 09/06/2017

## 2021-06-28 NOTE — LETTER
June 28, 2021     Ashley Floyd MD  [facility]    Patient: Lui sAlberto Casanova   YOB: 1950   Date of Visit: 6/28/2021       Dear Dr Rakel Noel:    Thank you for referring Kd Curry to me for evaluation  Below are my notes for this consultation  If you have questions, please do not hesitate to call me  I look forward to following your patient along with you  Sincerely,        Farzana Mao MD        CC: No Recipients  KATHIE Montague  6/28/2021  5:24 PM  Sign when Signing Visit  [id number] - Outpatient Follow-up Note  Luis Alberto Casanova 70 y o  female MRN: [id number]  Encounter: [id number]    ASSESSMENT AND PLAN:      1  Gastroesophageal reflux disease without esophagitis  2  Belching    Barium swallow 04/2021 revealed small sliding hiatal hernia and patulous GE junction with gastroesophageal reflux  She stop taking pantoprazole and is having recurrent epigastric pain with eating certain foods and reflux symptoms with lying down after eating  Temporary relief with Tums which she is taking several times a day  -recommend patient resume pantoprazole 40 mg daily in the a m  and continue to take Tums as needed  She will complete her prescription pantoprazole that she has at home ( approximately 30 tablets) and then will try OTC omeprazole 20 mg daily   We discussed taking OTC omeprazole 40 mg if her symptoms worsen  -reviewed dietary and lifestyle modifications and written instructions provided  She has good understanding of her trigger foods and tries to avoid these  -if her symptoms worsen or are not relieved with PPI, would recommend proceeding to EGD  Patient is aware and will consider EGD if necessary        2 Screening for colon cancer  Cologuard approximately 1 year ago was negative    Recommend follow-up screening with colonoscopy or Cologuard 2023      Followup Appointment:  3 months  ______________________________________________________________________    Chief Complaint   Patient presents with    Follow up barium swallow     HPI:  Evaluated in the office in March with 6 month history of epigastric discomfort with eating certain foods and frequent belching consistent with GERD  She was started on pantoprazole 40 mg daily and instructed to continue famotidine 40 mg daily at bedtime  EGD was recommended however the patient was hesitant to proceed with an invasive procedure  Barium swallow was recommended and performed 04/06/2021 which showed small sliding hiatal hernia, patulous GE junction with GERD but no reflux esophagitis  She presents today for follow-up  Her symptoms were well controlled on  Pantoprazole 40 mg daily  She stopped the famotidine because she felt like it was not helping her  She also stopped the pantoprazole approximately 3 weeks ago because she was unsure if she should continue on it  Unfortunately, she has had recurrence of her symptoms with epigastric pain and frequent belching  She denies heartburn or liquid reflux but she does experience discomfort with lying down after eating  She is taking Tums 1-2 times per day on most days with temporary relief of her symptoms  She denies any nausea or vomit and no unintentional weight loss  No dysphagia  Her bowels are regular with no recent change  She denies melena or rectal bleeding    Cologuard approximately 1 year ago was negative    Historical Information   Past Medical History:   Diagnosis Date    Anxiety     Arthritis     Baker cyst, left     Cholelithiasis     Chronic headaches     GERD (gastroesophageal reflux disease)     Menorrhagia     Sinus infection      Past Surgical History:   Procedure Laterality Date    CHOLECYSTECTOMY      IR ASPIRATION BAKERS CYST Left     LAPAROSCOPIC CHOLECYSTECTOMY       Social History     Substance and Sexual Activity   Alcohol Use Yes     Social History Substance and Sexual Activity   Drug Use No     Social History     Tobacco Use   Smoking Status Former Smoker    Packs/day: 0 50    Years: 20 00    Pack years: 10 00    Types: Cigarettes    Quit date: 1999    Years since quittin 5   Smokeless Tobacco Never Used     Family History   Problem Relation Age of Onset    Breast cancer Mother 46    Lung cancer Mother     Diabetes Mother     Prostate cancer Brother     No Known Problems Paternal Aunt     Stroke Father     No Known Problems Maternal Grandmother     No Known Problems Maternal Grandfather     No Known Problems Paternal Grandmother     No Known Problems Paternal Grandfather     No Known Problems Brother     No Known Problems Brother     No Known Problems Brother     No Known Problems Brother     No Known Problems Brother     Substance Abuse Neg Hx     Mental illness Neg Hx     Colon cancer Neg Hx     Colon polyps Neg Hx          Current Outpatient Medications:     pantoprazole (PROTONIX) 40 mg tablet    famotidine (PEPCID) 40 MG tablet  No Known Allergies  Reviewed medications and allergies and updated as indicated    PHYSICAL EXAM:    Blood pressure 148/94, pulse 84, height 5' (1 524 m), weight 61 7 kg (136 lb), not currently breastfeeding  Body mass index is 26 56 kg/m²  General Appearance: NAD, cooperative, alert  Eyes: Anicteric, PERRLA, EOMI  ENT:  Normocephalic, atraumatic, normal mucosa  Neck:  Supple, symmetrical, trachea midline  Resp:  Clear to auscultation bilaterally; no rales, rhonchi or wheezing; respirations unlabored   CV:  S1 S2, Regular rate and rhythm; no murmur, rub, or gallop  GI:  Soft, non-tender, non-distended; normal bowel sounds; no masses, no organomegaly   Rectal: Deferred  Musculoskeletal: No cyanosis, clubbing or edema  Normal ROM    Skin:  No jaundice, rashes, or lesions   Psych: Normal affect, good eye contact  Neuro: No gross deficits, AAOx3    Lab Results:   Lab Results   Component Value Date    WBC 5 5 10/12/2020    HGB 12 9 10/12/2020    HCT 38 5 10/12/2020    MCV 93 9 10/12/2020     10/12/2020     Lab Results   Component Value Date     09/06/2017    K 4 0 10/12/2020     10/12/2020    CO2 26 10/12/2020    BUN 12 10/12/2020    CREATININE 0 57 (L) 10/12/2020    CALCIUM 8 9 10/12/2020    AST 18 10/12/2020    ALT 16 10/12/2020    ALKPHOS 60 10/12/2020    PROT 6 7 09/06/2017    BILITOT 1 0 09/06/2017

## 2021-06-28 NOTE — PATIENT INSTRUCTIONS
Gastroesophageal Reflux Disease   WHAT YOU NEED TO KNOW:   Gastroesophageal reflux disease (GERD) is reflux that occurs more than twice a week for a few weeks  Reflux means acid and food in the stomach back up into the esophagus  It usually causes heartburn and other symptoms  GERD can cause other health problems over time if it is not treated  DISCHARGE INSTRUCTIONS:   Call your local emergency number (911 in the [address] [facility]) if:   · You have severe chest pain and sudden trouble breathing  Return to the emergency department if:   · You have trouble breathing after you vomit  · You have trouble swallowing, or pain with swallowing  · Your bowel movements are black, bloody, or tarry-looking  · Your vomit looks like coffee grounds or has blood in it  Call your doctor or gastroenterologist if:   · You feel full and cannot burp or vomit  · You vomit large amounts, or you vomit often  · You are losing weight without trying  · Your symptoms get worse or do not improve with treatment  · You have questions or concerns about your condition or care  Medicines:   · Medicines  are used to decrease stomach acid  Medicine may also be used to help your lower esophageal sphincter and stomach contract (tighten) more  · Take your medicine as directed  Contact your healthcare provider if you think your medicine is not helping or if you have side effects  Tell him of her if you are allergic to any medicine  Keep a list of the medicines, vitamins, and herbs you take  Include the amounts, and when and why you take them  Bring the list or the pill bottles to follow-up visits  Carry your medicine list with you in case of an emergency  Manage GERD:   · Do not have foods or drinks that may increase heartburn  These include chocolate, peppermint, fried or fatty foods, drinks that contain caffeine, or carbonated drinks (soda)  Other foods include spicy foods, onions, tomatoes, and tomato-based foods   Do not have foods or drinks that can irritate your esophagus, such as citrus fruits, juices, and alcohol  · Do not eat large meals  When you eat a lot of food at one time, your stomach needs more acid to digest it  Eat 6 small meals each day instead of 3 large ones, and eat slowly  Do not eat meals 2 to 3 hours before bedtime  · Elevate the head of your bed  Place 6-inch blocks under the head of your bed frame  You may also use more than one pillow under your head and shoulders while you sleep  · Maintain a healthy weight  If you are overweight, weight loss may help relieve symptoms of GERD  · Do not smoke  Smoking weakens the lower esophageal sphincter and increases the risk of GERD  Ask your healthcare provider for information if you currently smoke and need help to quit  E-cigarettes or smokeless tobacco still contain nicotine  Talk to your healthcare provider before you use these products  · Do not wear clothing that is tight around your waist   Tight clothing can put pressure on your stomach and cause or worsen GERD symptoms  Follow up with your doctor or gastroenterologist as directed:  Write down your questions so you remember to ask them during your visits  © Copyright [address] [place] [address] Drive Information is for End User's use only and may not be sold, redistributed or otherwise used for commercial purposes  All illustrations and images included in CareNotes® are the copyrighted property of A D A M , Inc  or [facility] [employer]   The above information is an  only  It is not intended as medical advice for individual conditions or treatments  Talk to your doctor, nurse or pharmacist before following any medical regimen to see if it is safe and effective for you

## 2021-10-06 ENCOUNTER — TELEMEDICINE (OUTPATIENT)
Dept: FAMILY MEDICINE CLINIC | Facility: CLINIC | Age: 71
End: 2021-10-06
Payer: MEDICARE

## 2021-10-06 VITALS — OXYGEN SATURATION: 96 % | TEMPERATURE: 68 F

## 2021-10-06 DIAGNOSIS — Z20.822 EXPOSURE TO COVID-19 VIRUS: Primary | ICD-10-CM

## 2021-10-06 PROCEDURE — U0003 INFECTIOUS AGENT DETECTION BY NUCLEIC ACID (DNA OR RNA); SEVERE ACUTE RESPIRATORY SYNDROME CORONAVIRUS 2 (SARS-COV-2) (CORONAVIRUS DISEASE [COVID-19]), AMPLIFIED PROBE TECHNIQUE, MAKING USE OF HIGH THROUGHPUT TECHNOLOGIES AS DESCRIBED BY CMS-2020-01-R: HCPCS | Performed by: FAMILY MEDICINE

## 2021-10-06 PROCEDURE — 99213 OFFICE O/P EST LOW 20 MIN: CPT | Performed by: FAMILY MEDICINE

## 2021-10-06 PROCEDURE — U0005 INFEC AGEN DETEC AMPLI PROBE: HCPCS | Performed by: FAMILY MEDICINE

## 2021-10-07 ENCOUNTER — TELEPHONE (OUTPATIENT)
Dept: FAMILY MEDICINE CLINIC | Facility: CLINIC | Age: 71
End: 2021-10-07

## 2021-10-07 LAB — SARS-COV-2 RNA RESP QL NAA+PROBE: NEGATIVE

## 2021-10-14 ENCOUNTER — IMMUNIZATIONS (OUTPATIENT)
Dept: FAMILY MEDICINE CLINIC | Facility: HOSPITAL | Age: 71
End: 2021-10-14

## 2021-10-14 DIAGNOSIS — Z23 ENCOUNTER FOR IMMUNIZATION: Primary | ICD-10-CM

## 2021-10-14 PROCEDURE — 0001A SARS-COV-2 / COVID-19 MRNA VACCINE (PFIZER-BIONTECH) 30 MCG: CPT

## 2021-10-14 PROCEDURE — 91300 SARS-COV-2 / COVID-19 MRNA VACCINE (PFIZER-BIONTECH) 30 MCG: CPT

## 2021-10-20 ENCOUNTER — OFFICE VISIT (OUTPATIENT)
Dept: GASTROENTEROLOGY | Facility: CLINIC | Age: 71
End: 2021-10-20
Payer: MEDICARE

## 2021-10-20 VITALS
WEIGHT: 141 LBS | HEART RATE: 74 BPM | DIASTOLIC BLOOD PRESSURE: 82 MMHG | HEIGHT: 60 IN | BODY MASS INDEX: 27.68 KG/M2 | SYSTOLIC BLOOD PRESSURE: 122 MMHG

## 2021-10-20 DIAGNOSIS — K21.9 GASTROESOPHAGEAL REFLUX DISEASE WITHOUT ESOPHAGITIS: ICD-10-CM

## 2021-10-20 PROCEDURE — 99213 OFFICE O/P EST LOW 20 MIN: CPT | Performed by: REGISTERED NURSE

## 2021-10-20 RX ORDER — FAMOTIDINE 40 MG/1
40 TABLET, FILM COATED ORAL
Qty: 30 TABLET | Refills: 5 | Status: SHIPPED | OUTPATIENT
Start: 2021-10-20

## 2021-10-20 RX ORDER — OMEPRAZOLE 20 MG/1
20 CAPSULE, DELAYED RELEASE ORAL EVERY OTHER DAY
COMMUNITY
End: 2021-11-17

## 2021-11-01 ENCOUNTER — TELEPHONE (OUTPATIENT)
Dept: FAMILY MEDICINE CLINIC | Facility: CLINIC | Age: 71
End: 2021-11-01

## 2021-11-01 DIAGNOSIS — Z13.6 SCREENING FOR CARDIOVASCULAR CONDITION: Primary | ICD-10-CM

## 2021-11-05 LAB
ALBUMIN SERPL-MCNC: 4.2 G/DL (ref 3.6–5.1)
ALBUMIN/GLOB SERPL: 1.4 (CALC) (ref 1–2.5)
ALP SERPL-CCNC: 72 U/L (ref 37–153)
ALT SERPL-CCNC: 19 U/L (ref 6–29)
AST SERPL-CCNC: 24 U/L (ref 10–35)
BILIRUB SERPL-MCNC: 0.9 MG/DL (ref 0.2–1.2)
BUN SERPL-MCNC: 13 MG/DL (ref 7–25)
BUN/CREAT SERPL: NORMAL (CALC) (ref 6–22)
CALCIUM SERPL-MCNC: 9.5 MG/DL (ref 8.6–10.4)
CHLORIDE SERPL-SCNC: 104 MMOL/L (ref 98–110)
CHOLEST SERPL-MCNC: 291 MG/DL
CHOLEST/HDLC SERPL: 4.5 (CALC)
CO2 SERPL-SCNC: 28 MMOL/L (ref 20–32)
CREAT SERPL-MCNC: 0.62 MG/DL (ref 0.6–0.93)
GLOBULIN SER CALC-MCNC: 3.1 G/DL (CALC) (ref 1.9–3.7)
GLUCOSE SERPL-MCNC: 94 MG/DL (ref 65–99)
HDLC SERPL-MCNC: 64 MG/DL
LDLC SERPL CALC-MCNC: 188 MG/DL (CALC)
NONHDLC SERPL-MCNC: 227 MG/DL (CALC)
POTASSIUM SERPL-SCNC: 4.5 MMOL/L (ref 3.5–5.3)
PROT SERPL-MCNC: 7.3 G/DL (ref 6.1–8.1)
SL AMB EGFR AFRICAN AMERICAN: 105 ML/MIN/1.73M2
SL AMB EGFR NON AFRICAN AMERICAN: 91 ML/MIN/1.73M2
SODIUM SERPL-SCNC: 139 MMOL/L (ref 135–146)
TRIGL SERPL-MCNC: 206 MG/DL

## 2021-11-09 ENCOUNTER — TELEPHONE (OUTPATIENT)
Dept: FAMILY MEDICINE CLINIC | Facility: CLINIC | Age: 71
End: 2021-11-09

## 2021-11-17 ENCOUNTER — OFFICE VISIT (OUTPATIENT)
Dept: FAMILY MEDICINE CLINIC | Facility: CLINIC | Age: 71
End: 2021-11-17
Payer: MEDICARE

## 2021-11-17 VITALS
OXYGEN SATURATION: 93 % | HEART RATE: 94 BPM | HEIGHT: 60 IN | DIASTOLIC BLOOD PRESSURE: 80 MMHG | BODY MASS INDEX: 28 KG/M2 | WEIGHT: 142.6 LBS | SYSTOLIC BLOOD PRESSURE: 130 MMHG | TEMPERATURE: 97.6 F

## 2021-11-17 DIAGNOSIS — K21.9 GASTROESOPHAGEAL REFLUX DISEASE WITHOUT ESOPHAGITIS: ICD-10-CM

## 2021-11-17 DIAGNOSIS — E78.00 HYPERCHOLESTEREMIA: ICD-10-CM

## 2021-11-17 DIAGNOSIS — J31.0 CHRONIC RHINITIS: ICD-10-CM

## 2021-11-17 DIAGNOSIS — Z23 NEED FOR INFLUENZA VACCINATION: ICD-10-CM

## 2021-11-17 DIAGNOSIS — Z00.00 MEDICARE ANNUAL WELLNESS VISIT, SUBSEQUENT: Primary | ICD-10-CM

## 2021-11-17 PROCEDURE — 96372 THER/PROPH/DIAG INJ SC/IM: CPT | Performed by: FAMILY MEDICINE

## 2021-11-17 PROCEDURE — 99214 OFFICE O/P EST MOD 30 MIN: CPT | Performed by: FAMILY MEDICINE

## 2021-11-17 PROCEDURE — G0438 PPPS, INITIAL VISIT: HCPCS | Performed by: FAMILY MEDICINE

## 2021-11-17 PROCEDURE — 90662 IIV NO PRSV INCREASED AG IM: CPT

## 2021-11-17 PROCEDURE — G0008 ADMIN INFLUENZA VIRUS VAC: HCPCS

## 2021-11-17 PROCEDURE — 1123F ACP DISCUSS/DSCN MKR DOCD: CPT | Performed by: FAMILY MEDICINE

## 2021-11-17 RX ORDER — ROSUVASTATIN CALCIUM 5 MG/1
5 TABLET, COATED ORAL DAILY
Qty: 30 TABLET | Refills: 5 | Status: SHIPPED | OUTPATIENT
Start: 2021-11-17 | End: 2022-02-14

## 2021-11-23 ENCOUNTER — HOSPITAL ENCOUNTER (OUTPATIENT)
Dept: MAMMOGRAPHY | Facility: CLINIC | Age: 71
Discharge: HOME | End: 2021-11-23
Payer: MEDICARE

## 2021-11-23 VITALS — HEIGHT: 60 IN | WEIGHT: 142 LBS | BODY MASS INDEX: 27.88 KG/M2

## 2021-11-23 DIAGNOSIS — Z12.31 ENCOUNTER FOR SCREENING MAMMOGRAM FOR MALIGNANT NEOPLASM OF BREAST: ICD-10-CM

## 2021-11-23 PROCEDURE — 77063 BREAST TOMOSYNTHESIS BI: CPT

## 2021-11-23 PROCEDURE — 77067 SCR MAMMO BI INCL CAD: CPT

## 2022-01-27 ENCOUNTER — OFFICE VISIT (OUTPATIENT)
Dept: GASTROENTEROLOGY | Facility: CLINIC | Age: 72
End: 2022-01-27
Payer: MEDICARE

## 2022-01-27 VITALS
WEIGHT: 139 LBS | BODY MASS INDEX: 27.29 KG/M2 | DIASTOLIC BLOOD PRESSURE: 80 MMHG | HEIGHT: 60 IN | SYSTOLIC BLOOD PRESSURE: 120 MMHG

## 2022-01-27 DIAGNOSIS — K21.9 GASTROESOPHAGEAL REFLUX DISEASE WITHOUT ESOPHAGITIS: ICD-10-CM

## 2022-01-27 DIAGNOSIS — Z12.11 SCREENING FOR COLON CANCER: Primary | ICD-10-CM

## 2022-01-27 PROCEDURE — 99213 OFFICE O/P EST LOW 20 MIN: CPT | Performed by: REGISTERED NURSE

## 2022-01-27 RX ORDER — AMOXICILLIN 500 MG/1
CAPSULE ORAL
COMMUNITY
Start: 2022-01-18 | End: 2022-04-07

## 2022-02-11 ENCOUNTER — TELEPHONE (OUTPATIENT)
Dept: FAMILY MEDICINE CLINIC | Facility: CLINIC | Age: 72
End: 2022-02-11

## 2022-02-11 NOTE — TELEPHONE ENCOUNTER
Patient calling for rosuvastatin  patient calling since she start this medication headaches are been more frequent then usual      Patient concern the it can be the medication looking to Swish if possible      Patient advise due for lab will get them completed     [facility] [phone]    Patient advise  out of the office until 02/14/2022

## 2022-02-13 LAB
ALBUMIN SERPL-MCNC: 4.1 G/DL (ref 3.6–5.1)
ALBUMIN/GLOB SERPL: 1.4 (CALC) (ref 1–2.5)
ALP SERPL-CCNC: 56 U/L (ref 37–153)
ALT SERPL-CCNC: 27 U/L (ref 6–29)
AST SERPL-CCNC: 28 U/L (ref 10–35)
BILIRUB SERPL-MCNC: 1.2 MG/DL (ref 0.2–1.2)
BUN SERPL-MCNC: 21 MG/DL (ref 7–25)
BUN/CREAT SERPL: NORMAL (CALC) (ref 6–22)
CALCIUM SERPL-MCNC: 9.6 MG/DL (ref 8.6–10.4)
CHLORIDE SERPL-SCNC: 106 MMOL/L (ref 98–110)
CHOLEST SERPL-MCNC: 212 MG/DL
CHOLEST/HDLC SERPL: 2.8 (CALC)
CO2 SERPL-SCNC: 26 MMOL/L (ref 20–32)
CREAT SERPL-MCNC: 0.76 MG/DL (ref 0.6–0.93)
GLOBULIN SER CALC-MCNC: 2.9 G/DL (CALC) (ref 1.9–3.7)
GLUCOSE SERPL-MCNC: 94 MG/DL (ref 65–99)
HDLC SERPL-MCNC: 76 MG/DL
LDLC SERPL CALC-MCNC: 115 MG/DL (CALC)
NONHDLC SERPL-MCNC: 136 MG/DL (CALC)
POTASSIUM SERPL-SCNC: 4.4 MMOL/L (ref 3.5–5.3)
PROT SERPL-MCNC: 7 G/DL (ref 6.1–8.1)
SL AMB EGFR AFRICAN AMERICAN: 91 ML/MIN/1.73M2
SL AMB EGFR NON AFRICAN AMERICAN: 79 ML/MIN/1.73M2
SODIUM SERPL-SCNC: 142 MMOL/L (ref 135–146)
TRIGL SERPL-MCNC: 104 MG/DL

## 2022-02-14 ENCOUNTER — TELEPHONE (OUTPATIENT)
Dept: FAMILY MEDICINE CLINIC | Facility: CLINIC | Age: 72
End: 2022-02-14

## 2022-02-14 DIAGNOSIS — E78.00 HYPERCHOLESTEREMIA: Primary | ICD-10-CM

## 2022-02-14 RX ORDER — ATORVASTATIN CALCIUM 20 MG/1
20 TABLET, FILM COATED ORAL DAILY
Qty: 30 TABLET | Refills: 5 | Status: SHIPPED | OUTPATIENT
Start: 2022-02-14

## 2022-02-14 NOTE — TELEPHONE ENCOUNTER
----- Message from Temitope Stein MD sent at 2/13/2022  9:32 AM EST -----  Call patient with lab result-cholesterol much improved-continue current medications

## 2022-02-15 ENCOUNTER — HOSPITAL ENCOUNTER (OUTPATIENT)
Dept: CT IMAGING | Facility: HOSPITAL | Age: 72
Discharge: HOME | End: 2022-02-15
Attending: OTOLARYNGOLOGY
Payer: MEDICARE

## 2022-02-15 DIAGNOSIS — G89.29 CHRONIC FACIAL PAIN: ICD-10-CM

## 2022-02-15 DIAGNOSIS — R51.9 CHRONIC FACIAL PAIN: ICD-10-CM

## 2022-02-15 PROCEDURE — 70486 CT MAXILLOFACIAL W/O DYE: CPT

## 2022-02-15 PROCEDURE — G1004 CDSM NDSC: HCPCS

## 2022-04-07 ENCOUNTER — OFFICE VISIT (OUTPATIENT)
Dept: FAMILY MEDICINE CLINIC | Facility: CLINIC | Age: 72
End: 2022-04-07
Payer: MEDICARE

## 2022-04-07 VITALS
HEIGHT: 61 IN | SYSTOLIC BLOOD PRESSURE: 124 MMHG | DIASTOLIC BLOOD PRESSURE: 78 MMHG | HEART RATE: 82 BPM | WEIGHT: 143.4 LBS | OXYGEN SATURATION: 98 % | BODY MASS INDEX: 27.08 KG/M2

## 2022-04-07 DIAGNOSIS — M54.2 CHRONIC NECK PAIN: Primary | ICD-10-CM

## 2022-04-07 DIAGNOSIS — M54.12 CERVICAL RADICULAR PAIN: ICD-10-CM

## 2022-04-07 DIAGNOSIS — G89.29 CHRONIC NECK PAIN: Primary | ICD-10-CM

## 2022-04-07 PROCEDURE — 99214 OFFICE O/P EST MOD 30 MIN: CPT | Performed by: FAMILY MEDICINE

## 2022-04-07 NOTE — PATIENT INSTRUCTIONS
Get report from chiropractor as to their therapy and some report on the x-ray  I will try to order MRI  If insurance company will not approve this, you may need to have more formal physical therapy  Or may need to have you see pain management/ Orthopedics

## 2022-04-07 NOTE — PROGRESS NOTES
[id number]        NAME: Lianna Shoemaker is a 70 y o  female  : 1950    MRN: [id number]  DATE: 2022  TIME: 9:51 AM    Assessment and Plan   Chronic neck pain [M54 2, G89 29]  1  Chronic neck pain     2  Cervical radicular pain         No problem-specific Assessment & Plan notes found for this encounter  Patient Instructions     Patient Instructions   Get report from chiropractor as to their therapy and some report on the x-ray  I will try to order MRI  If insurance company will not approve this, you may need to have more formal physical therapy  Or may need to have you see pain management/ Orthopedics  Chief Complaint     Chief Complaint   Patient presents with    Neck Pain     down into shoulder left worse than right    Puncture Wound     right LE -- hit with car door x 1week ago         History of Present Illness       Patient comes in today for evaluation of ongoing headaches and cervical pain  Had seen ENT who felt there was not any chronic sinus component  However, dentist did some waiting x-rays which apparently indicated some congestion in the sinuses  Due to the neck pain, patient has been seeing chiropractor for the last 2-3 months  Their treatments are not resolving the issue  She feels her neck is tighter and there are some times when she gets tingling down the arms bilaterally  Chiropractor had thought an MRI may be beneficial   Using over-the-counter measures with partial success per      Review of Systems   Review of Systems   Constitutional: Negative for appetite change, chills, diaphoresis and fever  HENT: Negative for ear pain, rhinorrhea, sinus pressure and sore throat  Eyes: Negative for discharge, redness and itching  Respiratory: Negative for cough, shortness of breath and wheezing  Cardiovascular: Negative for chest pain and palpitations          Rapid or slow heart rate   Gastrointestinal: Negative for abdominal pain, diarrhea, nausea and vomiting  Neurological: Positive for headaches  Negative for dizziness and light-headedness  Current Medications       Current Outpatient Medications:     atorvastatin (LIPITOR) 20 mg tablet, Take 1 tablet (20 mg total) by mouth daily, Disp: 30 tablet, Rfl: 5    famotidine (PEPCID) 40 MG tablet, Take 1 tablet (40 mg total) by mouth daily at bedtime (Patient taking differently: Take 20 mg by mouth daily at bedtime  ), Disp: 30 tablet, Rfl: 5    Current Allergies     Allergies as of 04/07/2022    (No Known Allergies)            The following portions of the patient's history were reviewed and updated as appropriate: allergies, current medications, past family history, past medical history, past social history, past surgical history and problem list      Past Medical History:   Diagnosis Date    Anxiety     Arthritis     Baker cyst, left     Cholelithiasis     Chronic headaches     GERD (gastroesophageal reflux disease)     Heart murmur     Menorrhagia     Sinus infection        Past Surgical History:   Procedure Laterality Date    CHOLECYSTECTOMY      IR ASPIRATION BAKERS CYST Left     LAPAROSCOPIC CHOLECYSTECTOMY         Family History   Problem Relation Age of Onset    Breast cancer Mother 46    Lung cancer Mother     Diabetes Mother     Prostate cancer Brother     No Known Problems Paternal Aunt     Stroke Father     No Known Problems Maternal Grandmother     No Known Problems Maternal Grandfather     No Known Problems Paternal Grandmother     No Known Problems Paternal Grandfather     No Known Problems Brother     No Known Problems Brother     No Known Problems Brother     No Known Problems Brother     Lymphoma Brother     Substance Abuse Neg Hx     Mental illness Neg Hx     Colon cancer Neg Hx     Colon polyps Neg Hx          Medications have been verified          Objective   /78 (BP Location: Right arm, Patient Position: Sitting, Cuff Size: Standard)   Pulse 82   Ht 5' 0 5" (1 537 m)   Wt 65 kg (143 lb 6 4 oz)   LMP  (LMP Unknown)   SpO2 98%   BMI 27 54 kg/m²        Physical Exam     Physical Exam  Vitals reviewed  Constitutional:       General: She is not in acute distress  Appearance: She is well-developed  HENT:      Head: Normocephalic and atraumatic  Right Ear: Tympanic membrane and external ear normal  No drainage  Left Ear: Tympanic membrane normal  No drainage  Mouth/Throat:      Pharynx: No oropharyngeal exudate  Eyes:      General:         Right eye: No discharge  Left eye: No discharge  Conjunctiva/sclera: Conjunctivae normal    Neck:      Thyroid: No thyromegaly  Cardiovascular:      Rate and Rhythm: Normal rate and regular rhythm  Heart sounds: Normal heart sounds  Pulmonary:      Effort: Pulmonary effort is normal  No respiratory distress  Breath sounds: No wheezing or rales  Musculoskeletal:      Cervical back: Neck supple  Tenderness present  Pain with movement present  Decreased range of motion  Lymphadenopathy:      Cervical: No cervical adenopathy  Skin:     General: Skin is warm and dry

## 2022-04-25 ENCOUNTER — TELEPHONE (OUTPATIENT)
Dept: NEUROLOGY | Facility: CLINIC | Age: 72
End: 2022-04-25

## 2022-04-25 NOTE — TELEPHONE ENCOUNTER
Called and spoke to patient - confirmed upcoming appointment with Kd Brady on 05/05/22 9:00 am at the [facility] office  Provided patient with apt date, time and location  Informed patient that check in is at least 15 minutes prior to apt time  The patient is not  having any issues or concerns at this time

## 2022-05-05 ENCOUNTER — CONSULT (OUTPATIENT)
Dept: NEUROLOGY | Facility: CLINIC | Age: 72
End: 2022-05-05
Payer: MEDICARE

## 2022-05-05 VITALS
HEIGHT: 59 IN | HEART RATE: 63 BPM | TEMPERATURE: 96.8 F | RESPIRATION RATE: 18 BRPM | BODY MASS INDEX: 27.21 KG/M2 | SYSTOLIC BLOOD PRESSURE: 123 MMHG | WEIGHT: 135 LBS | DIASTOLIC BLOOD PRESSURE: 57 MMHG

## 2022-05-05 DIAGNOSIS — R51.9 CHRONIC FACIAL PAIN: ICD-10-CM

## 2022-05-05 DIAGNOSIS — R51.9 SCALP TENDERNESS: ICD-10-CM

## 2022-05-05 DIAGNOSIS — G89.29 CHRONIC FACIAL PAIN: ICD-10-CM

## 2022-05-05 DIAGNOSIS — G44.86 CERVICOGENIC HEADACHE: Primary | ICD-10-CM

## 2022-05-05 DIAGNOSIS — K06.8 PAIN IN GUMS: ICD-10-CM

## 2022-05-05 DIAGNOSIS — M19.90 ARTHRITIS: ICD-10-CM

## 2022-05-05 PROCEDURE — 99203 OFFICE O/P NEW LOW 30 MIN: CPT | Performed by: NURSE PRACTITIONER

## 2022-05-05 RX ORDER — GABAPENTIN 100 MG/1
100 CAPSULE ORAL 2 TIMES DAILY
Qty: 60 CAPSULE | Refills: 1 | Status: SHIPPED | OUTPATIENT
Start: 2022-05-05 | End: 2022-07-07 | Stop reason: SINTOL

## 2022-05-05 NOTE — ASSESSMENT & PLAN NOTE
Cervicogenic headache with features of occipital neuralgia causing daily headaches-suggest PT and follow up MRI if not improved  For symptomatic management/prevention we will try gabapentin at low dose with PRN NSAID use; discussed side effects  She will continue with topical heat/ice  She does have TMJ degenerative findings on CT which may account for facial symptoms but interestingly she has improvement with chewing gum and no findings of grinding on exam by ENT per patient  Exam/history does not suggest trigeminal neuralgia at this time, will continue to follow  Neurological exam otherwise nonfocal and does not suggest cervical radiculopathy

## 2022-05-05 NOTE — PROGRESS NOTES
Patient ID: Gunner Vail is a 70 y o  female  Assessment/Plan:  Patient Instructions:  Magnesium oxide 400mg daily over the counter  Heat or ice like we spoke about  Gabapentin for headaches/neck pain, start with 1 capsule nightly and can increase to 1 capsule twice a day  Let me know of any side effects or if not helpful  Start PT, continue chiropractor  Follow up in 2 months to see if need for MRI     Cervicogenic headache  Cervicogenic headache with features of occipital neuralgia causing daily headaches-suggest PT and follow up MRI if not improved  For symptomatic management/prevention we will try gabapentin at low dose with PRN NSAID use; discussed side effects  She will continue with topical heat/ice  She does have TMJ degenerative findings on CT which may account for facial symptoms but interestingly she has improvement with chewing gum and no findings of grinding on exam by ENT per patient  Exam/history does not suggest trigeminal neuralgia at this time, will continue to follow  Neurological exam otherwise nonfocal and does not suggest cervical radiculopathy  Diagnoses and all orders for this visit:    Cervicogenic headache  -     Ambulatory Referral to Physical Therapy; Future  -     gabapentin (Neurontin) 100 mg capsule; Take 1 capsule (100 mg total) by mouth 2 (two) times a day Start with one capsule nightly and can increase to twice a day in 1 week    Chronic facial pain  -     Ambulatory referral to Neurology  -     gabapentin (Neurontin) 100 mg capsule; Take 1 capsule (100 mg total) by mouth 2 (two) times a day Start with one capsule nightly and can increase to twice a day in 1 week    Pain in gums  -     Ambulatory referral to Neurology    Arthritis  -     Ambulatory referral to Neurology  -     CBC and differential; Future  -     Sedimentation rate, automated;  Future  -     CBC and differential  -     Sedimentation rate, automated    Scalp tenderness  -     CBC and differential; Future  -     Sedimentation rate, automated; Future  -     CBC and differential  -     Sedimentation rate, automated         Subjective:  New patient paperwork filled out and scanned into chart  HPI  Karen Keyes is a 70year old right handed female with past medical history of arthritis, bakers cyst, gerd, hyperlipidemia, chronic sleep problems who presents today for new patient evaluation of headache/facial pain  Referred by ENT physician; also has seen PCP and chiropractor for evaluation  She states headaches were intermittent/not too bothersome since age 39  In the past 2-3 months she has had increased pain in her neck and has been having daily headaches- no injury or inciting event  Headaches start in occipital regions and radiate to bitemporal regions R>L  She does have some tenderness to temple  She does report pins and needles feeling in mouth and pressure in face that is constant/unable to be triggered  She denies TMJ tenderness  Interestingly she states chewing gum really helps her pain in her mouth  She has point tenderness to the back of the head  Headaches are accompanied by lightheadedness at times but no nausea, vomiting, photophobia, vision change (history of lasik surgery), fever, swallowing trouble/speech trouble, or persistent paresthesias/pain in her upper extremities  She states occasionally she will have tingling in her hands when she wakes up but this never lasts  She has used over the counter NSAID's with only some relief; heat helps some; never tried ice/other topicals    She has been going to the chiropractor 2x/week for past 6 weeks without much relief- states they did xrays of the neck there which show abnormal curvature (I do not have this to review)  She lives with her ; 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); she enjoys golfing/staying active   She states doesn't drink as much water as she should; she does use small amount of caffeine and drinks maybe 1 glass of wine/week  She states her sleep is same as it has always been- gets about 2-3 hours at a time, maybe 6-7 hours total/night; doesn't necessarily wake because of pain  CT sinus 2/15/2022:  FINDINGS:   FRONTAL SINUSES:  Clear  ETHMOID AIR CELLS:  Clear  MAXILLARY SINUSES:  Clear  SPHENOID SINUSES:  Clear  NASAL SEPTUM AND CAVITY:  Minor leftward deviation of the nasal septum  Normal nasal cavity  ANTERIOR OSTEOMEATAL COMPLEX:  Stenosis of the left hiatus semilunaris  Sphenoethmoidal recesses are clear  OSSEOUS STRUCTURES:  No bony erosion or destruction  Normal cribriform plate and planum sphenoidale  Visualized mastoid temporal bones are clear  The bony walls of the carotid canals are intact  Degenerative changes right greater than left TMJ  SOFT TISSUES:  Normal   IMPRESSION:  Stenosis left hiatus semilunaris, no significant sinus disease  Recent labs;  , improved from 188  BUN 21, creat 0 76, na 142, k 4 4, ast28, alt 27    The following portions of the patient's history were reviewed and updated as appropriate: allergies, current medications, past family history, past medical history, past social history, past surgical history and problem list          Objective:    Blood pressure 123/57, pulse 63, temperature (!) 96 8 °F (36 °C), temperature source Temporal, resp  rate 18, height 4' 11" (1 499 m), weight 61 2 kg (135 lb), not currently breastfeeding  Physical Exam  Vitals reviewed  Constitutional:       General: She is not in acute distress  Appearance: She is normal weight  She is not ill-appearing, toxic-appearing or diaphoretic  HENT:      Head: Normocephalic and atraumatic  Right Ear: External ear normal       Left Ear: External ear normal       Mouth/Throat:      Mouth: Mucous membranes are moist       Pharynx: Oropharynx is clear  Eyes:      General: Lids are normal       Extraocular Movements: Extraocular movements intact        Pupils: Pupils are equal, round, and reactive to light  Cardiovascular:      Rate and Rhythm: Normal rate and regular rhythm  Pulses: Normal pulses  Heart sounds: Normal heart sounds  Pulmonary:      Effort: Pulmonary effort is normal       Breath sounds: Normal breath sounds  Abdominal:      General: There is no distension  Musculoskeletal:      Cervical back: Normal range of motion  Tenderness present  No rigidity  Muscular tenderness present  No spinous process tenderness  Right lower leg: No edema  Left lower leg: No edema  Skin:     Findings: No rash  Neurological:      General: No focal deficit present  Mental Status: She is alert  Cranial Nerves: No cranial nerve deficit  Sensory: No sensory deficit  Motor: No weakness  Coordination: Romberg sign negative  Coordination normal       Gait: Gait normal       Deep Tendon Reflexes: Strength normal  Reflexes normal       Reflex Scores:       Brachioradialis reflexes are 2+ on the right side and 2+ on the left side  Patellar reflexes are 2+ on the right side and 2+ on the left side  Psychiatric:         Mood and Affect: Mood normal          Speech: Speech normal          Behavior: Behavior normal          Neurological Exam  Mental Status  Alert  Oriented to person, place and time  Speech is normal  Language is fluent with no aphasia  Cranial Nerves  CN II: Visual acuity is normal  Visual fields full to confrontation  CN III, IV, VI: Extraocular movements intact bilaterally  Normal lids and orbits bilaterally  Pupils equal round and reactive to light bilaterally  CN V: Facial sensation is normal   CN VII: Full and symmetric facial movement  CN VIII: Hearing is normal   CN IX, X: Palate elevates symmetrically  Normal gag reflex  CN XI: Shoulder shrug strength is normal   CN XII: Tongue midline without atrophy or fasciculations  Motor  Normal muscle bulk throughout   Strength is 5/5 throughout all four extremities  Sensory  Light touch is normal in upper and lower extremities  Reflexes                                           Right                      Left  Brachioradialis                    2+                         2+  Patellar                                2+                         2+    Coordination  Right: Finger-to-nose normal   Left: Finger-to-nose normal     Gait  Casual gait is normal including stance, stride, and arm swing  Romberg is absent  Able to rise from chair without using arms  ROS:    Review of Systems   Constitutional: Negative  Negative for appetite change and fever  HENT: Negative  Negative for hearing loss, tinnitus, trouble swallowing and voice change  Eyes: Negative  Negative for photophobia and pain  Respiratory: Negative  Negative for shortness of breath  Cardiovascular: Negative  Negative for palpitations  Gastrointestinal: Negative  Negative for nausea and vomiting  Endocrine: Negative  Negative for cold intolerance  Genitourinary: Negative  Negative for dysuria, frequency and urgency  Musculoskeletal: Positive for neck pain  Negative for myalgias  Skin: Negative  Negative for rash  Neurological: Positive for light-headedness and headaches (Daily)  Negative for dizziness, tremors, seizures, syncope, facial asymmetry, speech difficulty, weakness and numbness  Hematological: Negative  Does not bruise/bleed easily  Psychiatric/Behavioral: Negative  Negative for confusion, hallucinations and sleep disturbance     ROS was reviewed and updated as appropriate

## 2022-05-05 NOTE — PATIENT INSTRUCTIONS
Magnesium oxide 400mg daily over the counter  Heat or ice like we spoke about  Gabapentin for headaches/neck pain, start with 1 capsule nightly and can increase to 1 capsule twice a day  Let me know of any side effects or if not helpful  Start PT, continue chiropractor  Follow up in 2 months to see if need for MRI

## 2022-05-07 LAB
BASOPHILS # BLD AUTO: 62 CELLS/UL (ref 0–200)
BASOPHILS NFR BLD AUTO: 1 %
EOSINOPHIL # BLD AUTO: 149 CELLS/UL (ref 15–500)
EOSINOPHIL NFR BLD AUTO: 2.4 %
ERYTHROCYTE [DISTWIDTH] IN BLOOD BY AUTOMATED COUNT: 12.1 % (ref 11–15)
ERYTHROCYTE [SEDIMENTATION RATE] IN BLOOD BY WESTERGREN METHOD: 9 MM/H
HCT VFR BLD AUTO: 41.6 % (ref 35–45)
HGB BLD-MCNC: 13.9 G/DL (ref 11.7–15.5)
LYMPHOCYTES # BLD AUTO: 2071 CELLS/UL (ref 850–3900)
LYMPHOCYTES NFR BLD AUTO: 33.4 %
MCH RBC QN AUTO: 31.2 PG (ref 27–33)
MCHC RBC AUTO-ENTMCNC: 33.4 G/DL (ref 32–36)
MCV RBC AUTO: 93.3 FL (ref 80–100)
MONOCYTES # BLD AUTO: 577 CELLS/UL (ref 200–950)
MONOCYTES NFR BLD AUTO: 9.3 %
NEUTROPHILS # BLD AUTO: 3342 CELLS/UL (ref 1500–7800)
NEUTROPHILS NFR BLD AUTO: 53.9 %
PLATELET # BLD AUTO: 322 THOUSAND/UL (ref 140–400)
PMV BLD REES-ECKER: 10.4 FL (ref 7.5–12.5)
RBC # BLD AUTO: 4.46 MILLION/UL (ref 3.8–5.1)
WBC # BLD AUTO: 6.2 THOUSAND/UL (ref 3.8–10.8)

## 2022-06-09 ENCOUNTER — EVALUATION (OUTPATIENT)
Dept: PHYSICAL THERAPY | Facility: CLINIC | Age: 72
End: 2022-06-09
Payer: MEDICARE

## 2022-06-09 DIAGNOSIS — G44.86 CERVICOGENIC HEADACHE: ICD-10-CM

## 2022-06-09 PROCEDURE — 97162 PT EVAL MOD COMPLEX 30 MIN: CPT

## 2022-06-09 NOTE — PROGRESS NOTES
PT Evaluation     Today's date: 2022  Patient name: Aurelia Lopez  : 1950  MRN: [id number]  Referring provider: KATHIE Ferrera  Dx:   Encounter Diagnosis     ICD-10-CM    1  Cervicogenic headache  G44 86 Ambulatory Referral to Physical Therapy     PT plan of care cert/re-cert       Start Time: 83  Stop Time: 0510  Total time in clinic (min): 55 minutes    Assessment  Assessment details: Patient presents to PT with worsening HA not alleviated by medication, or recent chiropractor care  She exhibits poor postural positioning with protracted cervical position and slight rotation to left  Abnormal soft tissue density is noted with point tenderness evident and referral patterns consistent with SCM, suboccipital and trapezius  STM with changes to "sinus" pain noted  No muscle weakness or gait disturbance is noted  Patient will benefit from skilled intervention to facilitate trigger point release, postural education to limit cervical strain and education to allow for improved self management of symptoms with improved activity tolerance  Patient is an excellent rehab candidate, however, is limited in her ability to consistently attend PT due to golf schedule  Patient is presently scheduled for PT once a week, further assessment will follow if more frequent intervention is needed to attain goals  Patient is in agreement with treatment plan  Thank you for this referral   Impairments: abnormal muscle tone, activity intolerance, pain with function and poor posture     Goals  STG  (4 weeks)  Patient will be independent in self-correction of posture to avoid cervical strain                             Patient will be independent in basic HEP for cervical retraction, thoracic extension stretch and SNAGS                             Patient will report decrease in HA with decreased pain noted at end of day by 1-2 PPS    LTG (8weeks)  Patient will report decreased HA with functional tasks achieving predicted FOTO scoring  Patient will be independent in self-release for pain management                         Patient will determine if continued use of KT is assistive in continued pain relief    Plan  Patient would benefit from: skilled physical therapy  Planned therapy interventions: ADL retraining, manual therapy, neuromuscular re-education, postural training, patient education, strengthening, stretching, therapeutic activities, therapeutic exercise and home exercise program  Frequency: 1x week  Duration in weeks: 8  Treatment plan discussed with: patient        Subjective Evaluation    History of Present Illness  Mechanism of injury: Patient has been experiencing increased HA over the last several months  She has been seeing the chiropractor with no significant change noted  Mastication decreases headache pain  She had sinus imaging with no limitations noted  No MVA, however, fell down the stairs 1 5 yrs ago carrying a piece of furniture  She does not sleep well, and uses a wedge for sleeping due to GERD  Better when she gets up in the morning  Pain  Current pain ratin  At best pain ratin  Location: HA posterior neck up into sinuses and front of face    Social Support  Steps to enter house: yes  4  Stairs in house: yes   14  Lives in: multiple-level home  Lives with: spouse    Employment status: not working  Life stress: works part time occasionally at [employer]          Objective     Concurrent Complaints  Positive for headaches       Strength/Myotome Testing     Left Shoulder     Planes of Motion   Flexion: 5   Abduction: 5     Right Shoulder     Planes of Motion   Flexion: 5   Abduction: 5     Left Elbow   Flexion: 5  Extension: 5    Right Elbow   Flexion: 5  Extension: 5    Left Wrist/Hand   Wrist extension: 5  Wrist flexion: 5    Right Wrist/Hand   Wrist extension: 5  Wrist flexion: 5    Left Hip   Planes of Motion   Flexion: 5  Extension: 5  Abduction: 5  External rotation: 5    Right Hip   Planes of Motion   Flexion: 5  Extension: 5  Abduction: 5  External rotation: 5    Left Knee   Flexion: 5  Extension: 5    Right Knee   Flexion: 5  Extension: 5    Left Ankle/Foot   Dorsiflexion: 5  Plantar flexion: 5    Right Ankle/Foot   Dorsiflexion: 5  Plantar flexion: 5  Neuro Exam:     Headaches   Patient reports headaches: Yes  Frequency: constant fluctuating in intensity    Sensation   Light touch LE: left WNL and right WNL    Coordination   Heel to shin: left WNL and right WNL  Finger to nose: left WNL and right WNL  Rapid alternating movements: UE WNL and LE impaired     Posture: Forward head and shoulder  Cervical:  3/4 range rotation to left, pain noted end range for B/L rotation, full flexion/extension with "pulling" noted at end range  Soft Tissue:  Increased muscle guarding with increased SCM, UT, Scalenes, and suboccipital musculature  Point tenderness noted with increased sinus/jaw pain noted with lateral neck release  Referral pain evident with suboccipital release with decreased frontal pressure post release  Flowsheet Rows    Flowsheet Row Most Recent Value   PT/OT G-Codes    Current Score 54   Projected Score 64   FOTO information reviewed Yes   Assessment Type Evaluation   G code set Mobility: Walking & Moving Around             Precautions:   Worsening MENJIVAR, GERD  EPOC:  8/4/2022      Manuals 6/9            TPR SCM, Scalenes, UT, Lev VM            Suboccipital release VM                                      Neuro Re-Ed             Postural educ-use of lumbar roll VM                                                                                          Ther Ex             SNAGS 10" x4ea            Cervical retraction with towel 4                                                                                          Ther Activity                                       Gait Training                                       Modalities             KT Paper off tension, I band inhibition UT B/L VM

## 2022-06-13 ENCOUNTER — OFFICE VISIT (OUTPATIENT)
Dept: PHYSICAL THERAPY | Facility: CLINIC | Age: 72
End: 2022-06-13
Payer: MEDICARE

## 2022-06-13 DIAGNOSIS — G44.86 CERVICOGENIC HEADACHE: Primary | ICD-10-CM

## 2022-06-13 PROCEDURE — 97110 THERAPEUTIC EXERCISES: CPT

## 2022-06-13 PROCEDURE — 97140 MANUAL THERAPY 1/> REGIONS: CPT

## 2022-06-13 NOTE — PROGRESS NOTES
Daily Note     Today's date: 2022  Patient name: Jacob Gomez  : 1950  MRN: [id number]  Referring provider: KATHIE Yan  Dx:   Encounter Diagnosis     ICD-10-CM    1  Cervicogenic headache  G44 86        Start Time: 445  Stop Time: 526  Total time in clinic (min): 41 minutes    Subjective:  Patient traveled 1 hour for golf, she states she felt slight improvement from use of KT  She continues to chew gum to assist   B/L UT 5/10  Objective: See treatment diary below      Assessment:  Significant tightness is noted t/o cervical soft tissue with point tenderness and referral pain noted particularly in the suboccipital region  Nice gains in overall decreased pain to 3/10  Patient instructed in self release, use of backjoy and instructions in suboccipital release using tennis balls with voiced understanding  Given reduction in pain, KT held with strips given for application as needed, instruction in proper application and review of tape care performed  Patient demonstrates HEP well  Plan: Assess self-release and success in moderating activity to avoid cervical strain  Continue to address soft tissue restrictions and improve pain management  Pain levels reduced to 3/10 post treatment  Precautions:   Worsening MENJIVAR, GERD  EPOC:  2022      Manuals            TPR SCM, Scalenes, UT, Lev VM VM           Suboccipital release VM VM                                     Neuro Re-Ed             Postural educ-use of lumbar roll VM reviewed                                                                                         Ther Ex             SNAGS 10" x4ea 10" x2ea           Cervical retraction with towel 4 4           Self release with backjoy  VM           Instruction in tennis ball for suboccipital release  VM                                                               Ther Activity                                       Gait Training Modalities             KT Paper off tension, I band inhibition UT B/L VM given

## 2022-06-16 ENCOUNTER — APPOINTMENT (OUTPATIENT)
Dept: PHYSICAL THERAPY | Facility: CLINIC | Age: 72
End: 2022-06-16
Payer: MEDICARE

## 2022-06-20 ENCOUNTER — OFFICE VISIT (OUTPATIENT)
Dept: PHYSICAL THERAPY | Facility: CLINIC | Age: 72
End: 2022-06-20
Payer: MEDICARE

## 2022-06-20 DIAGNOSIS — G44.86 CERVICOGENIC HEADACHE: Primary | ICD-10-CM

## 2022-06-20 PROCEDURE — 97140 MANUAL THERAPY 1/> REGIONS: CPT

## 2022-06-20 PROCEDURE — 97110 THERAPEUTIC EXERCISES: CPT

## 2022-06-20 NOTE — PROGRESS NOTES
Daily Note     Today's date: 2022  Patient name: Radha Lin  : 1950  MRN: [id number]  Referring provider: KATHIE Booker  Dx:   Encounter Diagnosis     ICD-10-CM    1  Cervicogenic headache  G44 86        Start Time: 315  Stop Time: 400  Total time in clinic (min): 45 minutes    Subjective:  Patient states she continues to notice neck stiffness, 5/10, HA today increased this morning 8/10 alleviated with chewing gum  She has not yet gotten to purchase tennis balls or backjoy for self-release  KT continues to be assistive  Objective: See treatment diary below      Assessment:    Discussed prolonged forward position with golf game  Instructed in extension stretch for cervical with UE support with good demonstration  Continue to encourage increased extension during day to avoid increased irritation to suboccipital musculature  Patient voiced understanding to all instructions  STM with nice gains in decreased neck and HA pain  Plan:   Neck pain substantially decreased to 3-4/10 and HA reduced to 4/10 with most of symptoms located along jaw  Continue to address soft tissue restrictions and improve pain management  Consider return to KT as needed  Precautions:   Worsening MENJIVAR, GERD  EPOC:  2022      Manuals           TPR SCM, Scalenes, UT, Lev VM VM VM          Suboccipital release VM VM VM          Sphenoid lift   VM          Frontal lift   VM          Neuro Re-Ed             Postural educ-use of lumbar roll VM reviewed                                                                                         Ther Ex             SNAGS 10" x4ea 10" x2ea           Cervical retraction with towel 4 4 4          Self release with backjoy  VM reviewed          Instruction in tennis ball for suboccipital release  VM           Cervical extension with UE support in stance   4                                                 Ther Activity Gait Training                                       Modalities             KT Paper off tension, I band inhibition UT B/L VM given

## 2022-06-30 ENCOUNTER — OFFICE VISIT (OUTPATIENT)
Dept: PHYSICAL THERAPY | Facility: CLINIC | Age: 72
End: 2022-06-30
Payer: MEDICARE

## 2022-06-30 ENCOUNTER — TELEPHONE (OUTPATIENT)
Dept: NEUROLOGY | Facility: CLINIC | Age: 72
End: 2022-06-30

## 2022-06-30 DIAGNOSIS — G44.86 CERVICOGENIC HEADACHE: Primary | ICD-10-CM

## 2022-06-30 PROCEDURE — 97110 THERAPEUTIC EXERCISES: CPT

## 2022-06-30 PROCEDURE — 97140 MANUAL THERAPY 1/> REGIONS: CPT

## 2022-06-30 NOTE — TELEPHONE ENCOUNTER
Called and spoke to patient - confirmed upcoming appointment with Serge Squires on 07/07/22 8:45 am at the [facility] office  Provided patient with apt date, time and location  Informed patient that check in is at least 15 minutes prior to apt time  The patient is not  having any issues or concerns at this time

## 2022-06-30 NOTE — PROGRESS NOTES
Daily Note     Today's date: 2022  Patient name: Kyung Allan  : 1950  MRN: [id number]  Referring provider: KATHIE Shahid  Dx:   Encounter Diagnosis     ICD-10-CM    1  Cervicogenic headache  G44 86        Start Time: 1105  Stop Time: 1145  Total time in clinic (min): 40 minutes    Subjective:  Patient arrived today with increased left sided neck pain  Her brother had a stroke and she has been busy with caring for him  Less frontal HA noted  She has received backjoy and cervical release  6/10 jaw pain noted  Objective: See treatment diary below      Assessment:    Discussed use of HA diary to allow for further understanding of triggers for pain  STM continues to be assistive in decreasing referral pattern pain  Reviewed HEP with good performance  Postural correction continues due to continued tendency to assume protracted position  Plan:   Decreased jaw pain post treatment, -3/10  Assess HA diary  Continue to address soft tissue restrictions and improve pain management  Consider return to KT as needed  Precautions:   Worsening MENJIVAR, GERD  EPOC:  2022      Manuals          TPR SCM, Scalenes, UT, Lev VM VM VM VM         Suboccipital release VM VM VM VM         Sphenoid lift   VM          Frontal lift   VM          Neuro Re-Ed             Postural educ-use of lumbar roll VM reviewed  VM                                                                                       Ther Ex             SNAGS 10" x4ea 10" x2ea           Cervical retraction with towel 4 4 4 4         Self release with backjoy  VM reviewed decreased pressure recommended         Instruction in tennis ball for suboccipital release  VM           Cervical extension with UE support in stance   4 4                                                Ther Activity                                       Gait Training                                       Modalities             KT Paper off tension, I band inhibition UT B/L VM given

## 2022-07-05 ENCOUNTER — OFFICE VISIT (OUTPATIENT)
Dept: PHYSICAL THERAPY | Facility: CLINIC | Age: 72
End: 2022-07-05
Payer: MEDICARE

## 2022-07-05 DIAGNOSIS — G44.86 CERVICOGENIC HEADACHE: Primary | ICD-10-CM

## 2022-07-05 PROCEDURE — 97140 MANUAL THERAPY 1/> REGIONS: CPT

## 2022-07-05 PROCEDURE — 97110 THERAPEUTIC EXERCISES: CPT

## 2022-07-05 NOTE — PROGRESS NOTES
Daily Note     Today's date: 2022  Patient name: Pawel Machado  : 1950  MRN: [id number]  Referring provider: KATHIE Car Ra  Dx:   Encounter Diagnosis     ICD-10-CM    1  Cervicogenic headache  G44 86        Start Time: 460  Stop Time: 96  Total time in clinic (min): 45 minutes    Subjective:   Pain continues to be noted with prolonged activity  Neck pain 5/10, 6/10 facial pain  Objective: See treatment diary below      Assessment:   Per patient she has noticed increased overall facial pain over time during the day, not connected to any specific activity or position  Consistently, patient has less pain on waking and lying down decreases her pain  Discussed intermittent positioning to minimize pain as needed during her day with follow-up next session to assess if decreased pain is noted with this  STM/TPR does continue to elicit referral pain into face from anterior SCM at proximal attachment  Suboccipital release also results in decreased cervical restriction and decreased pain  Patient is to see Neurology and has been recommended to discuss possible trigger point injection to allow for more extended pain relief with details to follow  Plan:   Decreased jaw pain post treatment, -3/10, no significant neck pain noted post treatment  Continue to address soft tissue restrictions and improve pain management  Assess postural positioning in allowing decreased overall pain intermittently t/o day  Precautions:   Worsening MENJIVAR, GERD  EPOC:  2022      Manuals         TPR SCM, Scalenes, UT, Lev VM VM VM VM VM        Suboccipital release VM VM VM VM VM        Sphenoid lift   VM          Frontal lift   VM          Neuro Re-Ed             Postural educ-use of lumbar roll VM reviewed  VM reviewed- postural positioning instructed t/o day into sidlying to decrease pain                                                                                      Ther Ex SNAGS 10" x4ea 10" x2ea           Cervical retraction with towel 4 4 4 4 assisted retraction 5        Self release with backjoy  VM reviewed decreased pressure recommended improved with decreased pressure        Instruction in tennis ball for suboccipital release  VM           Cervical extension with UE support in stance   4 4 seated 4                                               Ther Activity                                       Gait Training                                       Modalities             KT Paper off tension, I band inhibition UT B/L VM given

## 2022-07-07 ENCOUNTER — OFFICE VISIT (OUTPATIENT)
Dept: NEUROLOGY | Facility: CLINIC | Age: 72
End: 2022-07-07
Payer: MEDICARE

## 2022-07-07 VITALS
WEIGHT: 133 LBS | TEMPERATURE: 96.9 F | BODY MASS INDEX: 26.81 KG/M2 | HEART RATE: 64 BPM | HEIGHT: 59 IN | DIASTOLIC BLOOD PRESSURE: 60 MMHG | SYSTOLIC BLOOD PRESSURE: 124 MMHG | OXYGEN SATURATION: 98 %

## 2022-07-07 DIAGNOSIS — M26.623 BILATERAL TEMPOROMANDIBULAR JOINT PAIN: ICD-10-CM

## 2022-07-07 DIAGNOSIS — K14.6 TONGUE PAIN: ICD-10-CM

## 2022-07-07 DIAGNOSIS — G44.86 CERVICOGENIC HEADACHE: Primary | ICD-10-CM

## 2022-07-07 DIAGNOSIS — R51.9 FACIAL PAIN: ICD-10-CM

## 2022-07-07 DIAGNOSIS — M54.81 BILATERAL OCCIPITAL NEURALGIA: ICD-10-CM

## 2022-07-07 PROCEDURE — 99213 OFFICE O/P EST LOW 20 MIN: CPT | Performed by: NURSE PRACTITIONER

## 2022-07-07 NOTE — PATIENT INSTRUCTIONS
MRI brain/c spine for further evaluation as discussed  For TMJ pain I would suggest less gum chewing and use of mouthguard  For muscle pain, continue PT and I will try to get you some topical agents as gabapentin caused you side effects  I will also see if there is availability for you to get a trigger point injection or/nerve block  Follow up in 3 months

## 2022-07-07 NOTE — PROGRESS NOTES
Patient ID: Luis Alberto Casanova is a 67 y o  female  Assessment/Plan:  Patient Instructions:  MRI brain/c spine for further evaluation as discussed  For TMJ pain I would suggest less gum chewing and use of mouthguard  For muscle pain, continue PT and I will try to get you some topical agents as gabapentin caused you side effects  I will also see if there is availability for you to get a trigger point injection or/nerve block  Follow up in 3 months  MRI for continued headaches/facial pain as well as tongue burning sensation  MRI C spine for continued neck pain despite conservative management/PT and with findings of tenderness/decreased ROM  Diagnoses and all orders for this visit:    Cervicogenic headache  -     MRI cervical spine wo contrast; Future  -     BUN; Future  -     Creatinine, serum; Future  -     BUN  -     Creatinine, serum    Bilateral temporomandibular joint pain    Bilateral occipital neuralgia  -     MRI brain cavernous / trigeminal wo and w contrast; Future  -     BUN; Future  -     Creatinine, serum; Future  -     BUN  -     Creatinine, serum    Tongue pain  Comments:  burning  ?glossopharyngeal neuralgia    Orders:  -     MRI brain cavernous / trigeminal wo and w contrast; Future    Facial pain  -     MRI brain cavernous / trigeminal wo and w contrast; Future         Subjective:    HPI  Mary Mahoney presents for 2 month follow up of headache/facial pain  When last seen she was referred to complete blood work which was normal and to go to PT and start gabapentin for pain control  She states today she did not feel well on gabapentin so she stopped this and PT was helpful some for a short time, but no long lasting relief as she is still getting regular neck pain and facial pain  She also mentions an intermittent  burning sensation on her tongue which she cannot determine what exactly triggers this  She still states that chewing gum helps this  She denies ear/throat pain   She continues to go to [facility] for chiropractor  She states has been taking magnesium daily  Pain does cause some difficulty sleeping at times  PT had suggested to get trigger point injections  Previous History:  Referred by ENT physician; also has seen PCP and chiropractor for evaluation  She states headaches were intermittent/not too bothersome since age 39  In the past 2-3 months she has had increased pain in her neck and has been having daily headaches- no injury or inciting event  Headaches start in occipital regions and radiate to bitemporal regions R>L  She does have some tenderness to temple  She does report pins and needles feeling in mouth and pressure in face that is constant/unable to be triggered  She denies TMJ tenderness  Interestingly she states chewing gum really helps her pain in her mouth  She has point tenderness to the back of the head  Headaches are accompanied by lightheadedness at times but no nausea, vomiting, photophobia, vision change (history of lasik surgery), fever, swallowing trouble/speech trouble, or persistent paresthesias/pain in her upper extremities  She states occasionally she will have tingling in her hands when she wakes up but this never lasts  She has used over the counter NSAID's with only some relief; heat helps some; never tried ice/other topicals    She has been going to the chiropractor 2x/week for past 6 weeks without much relief- states they did xrays of the neck there which show abnormal curvature (I do not have this to review)  She lives with her ; 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); she enjoys golfing/staying active  She states doesn't drink as much water as she should; she does use small amount of caffeine and drinks maybe 1 glass of wine/week  She states her sleep is same as it has always been- gets about 2-3 hours at a time, maybe 6-7 hours total/night; doesn't necessarily wake because of pain       The following portions of the patient's history were reviewed and updated as appropriate: allergies, current medications, past family history, past medical history, past social history, past surgical history and problem list          Objective:    Blood pressure 124/60, pulse 64, temperature (!) 96 9 °F (36 1 °C), temperature source Temporal, height 4' 11" (1 499 m), weight 60 3 kg (133 lb), SpO2 98 %, not currently breastfeeding  Physical Exam  Vitals reviewed  Constitutional:       General: She is not in acute distress  Appearance: She is not ill-appearing, toxic-appearing or diaphoretic  HENT:      Head: Normocephalic and atraumatic  Comments: tmj tenderness     Right Ear: External ear normal       Left Ear: External ear normal       Nose: Nose normal       Mouth/Throat:      Mouth: Mucous membranes are moist       Pharynx: Oropharynx is clear  Comments: Normal tongue sensation on exam per patient  Eyes:      Extraocular Movements: Extraocular movements intact  Pupils: Pupils are equal, round, and reactive to light  Cardiovascular:      Rate and Rhythm: Normal rate and regular rhythm  Pulses: Normal pulses  Heart sounds: Normal heart sounds  Pulmonary:      Effort: Pulmonary effort is normal       Breath sounds: Normal breath sounds  Abdominal:      General: There is no distension  Musculoskeletal:      Cervical back: Tenderness present  No rigidity  Muscular tenderness present  Decreased range of motion  Right lower leg: No edema  Left lower leg: No edema  Skin:     General: Skin is warm and dry  Capillary Refill: Capillary refill takes less than 2 seconds  Neurological:      General: No focal deficit present  Mental Status: She is alert  Coordination: Romberg sign negative  Deep Tendon Reflexes: Strength normal       Reflex Scores:       Brachioradialis reflexes are 2+ on the right side and 2+ on the left side         Patellar reflexes are 2+ on the right side and 2+ on the left side  Psychiatric:         Mood and Affect: Mood normal          Speech: Speech normal          Behavior: Behavior normal          Neurological Exam  Mental Status  Alert  Oriented to person, place and time  Speech is normal  Language is fluent with no aphasia  Cranial Nerves  CN II: Right visual acuity: counts fingers  Left visual acuity: counts fingers  CN III, IV, VI: Extraocular movements intact bilaterally  Pupils equal round and reactive to light bilaterally  CN V: Facial sensation is normal   CN VII: Full and symmetric facial movement  CN VIII: Hearing is normal   CN IX, X: Palate elevates symmetrically  CN XI: Shoulder shrug strength is normal   CN XII: Tongue midline without atrophy or fasciculations  Motor  Normal muscle bulk throughout  Strength is 5/5 throughout all four extremities  Sensory  Light touch is normal in upper and lower extremities  Reflexes                                            Right                      Left  Brachioradialis                    2+                         2+  Patellar                                2+                         2+    Coordination  Right: Finger-to-nose normal Left: Finger-to-nose normal     Gait  Casual gait is normal including stance, stride, and arm swing  Romberg is absent  Able to rise from chair without using arms  ROS:    Review of Systems   Constitutional: Positive for fatigue  Negative for appetite change and fever  HENT: Negative  Negative for hearing loss, tinnitus, trouble swallowing and voice change  Eyes: Negative  Negative for photophobia and pain  Respiratory: Negative  Negative for shortness of breath  Cardiovascular: Negative  Negative for palpitations  Gastrointestinal: Negative  Negative for nausea and vomiting  Endocrine: Negative  Negative for cold intolerance  Genitourinary: Negative  Negative for dysuria, frequency and urgency     Musculoskeletal: Positive for neck pain and neck stiffness  Negative for myalgias  Skin: Negative  Negative for rash  Neurological: Positive for headaches  Negative for dizziness, tremors, seizures, syncope, facial asymmetry, speech difficulty, weakness, light-headedness and numbness  Hematological: Negative  Does not bruise/bleed easily  Psychiatric/Behavioral: Positive for sleep disturbance  Negative for confusion and hallucinations     ROS was reviewed and updated as appropriate

## 2022-07-15 ENCOUNTER — OFFICE VISIT (OUTPATIENT)
Dept: PHYSICAL THERAPY | Facility: CLINIC | Age: 72
End: 2022-07-15
Payer: MEDICARE

## 2022-07-15 DIAGNOSIS — G44.86 CERVICOGENIC HEADACHE: Primary | ICD-10-CM

## 2022-07-15 PROCEDURE — 97112 NEUROMUSCULAR REEDUCATION: CPT

## 2022-07-15 PROCEDURE — 97110 THERAPEUTIC EXERCISES: CPT

## 2022-07-15 NOTE — PROGRESS NOTES
PT PROGRESS NOTE/ DAILY NOTE    Today's date: 7/15/2022  Patient name: Chris Villalpando  : 1950  MRN: [id number]  Referring provider: KATHIE Mcclure  Dx:   Encounter Diagnosis     ICD-10-CM    1  Cervicogenic headache  G44 86        Start Time: 855  Stop Time: 930  Total time in clinic (min): 35 minutes    Assessment  Assessment details: Patient presents to PT with worsening HA not alleviated by medication, or recent chiropractor care  She exhibits poor postural positioning with protracted cervical position and slight rotation to left  Abnormal soft tissue density is noted with point tenderness evident and referral patterns consistent with SCM, suboccipital and trapezius  STM with changes to "sinus" pain noted  No muscle weakness or gait disturbance is noted  Patient will benefit from skilled intervention to facilitate trigger point release, postural education to limit cervical strain and education to allow for improved self management of symptoms with improved activity tolerance  Patient is an excellent rehab candidate, however, is limited in her ability to consistently attend PT due to golf schedule  Patient is presently scheduled for PT once a week, further assessment will follow if more frequent intervention is needed to attain goals  Patient is in agreement with treatment plan  Thank you for this referral     7/15  Patient with improved cervical rotation and improved soft tissue restriction in cervical region  HA continue to be reduced in am upon awaking and worsen t/o day  To date, no positioning consistently reduces pain during day, however, patient admits to not lying supine when symptoms start to worsen  Discussed positioning and attention to when symptoms worsen to allow for better determination of aggravating factors  Patient has noticed burning in her tongue and continued jaw pressure,  Pointing to zygomatic arch    She has seen neurology and discussed possible TMJ influence, however, patient has not complained of jaw pain and continues to feel better with chewing gum  Patient is now scheduled for cervical and brain MRI  Recommended hold on PT until after results  Patient has been instructed to contact PT should her symptoms worsen  She is in agreement with treatment plan  Impairments: abnormal muscle tone, activity intolerance, pain with function and poor posture     Goals  STG  (4 weeks)  Patient will be independent in self-correction of posture to avoid cervical strain- partially met                             Patient will be independent in basic HEP for cervical retraction, thoracic extension stretch and SNAGS- MET                             Patient will report decrease in HA with decreased pain noted at end of day by 1-2 PPS- not met  LTG (8weeks)  Patient will report decreased HA with functional tasks achieving predicted FOTO scoring  Patient will be independent in self-release for pain management                         Patient will determine if continued use of KT is assistive in continued pain relief    Plan  Patient would benefit from: skilled physical therapy  Planned therapy interventions: ADL retraining, manual therapy, neuromuscular re-education, postural training, patient education, strengthening, stretching, therapeutic activities, therapeutic exercise and home exercise program  Frequency: 1x week  Duration in weeks: 4  Treatment plan discussed with: patient        Subjective Evaluation    History of Present Illness  Mechanism of injury: Patient has been experiencing increased HA over the last several months  She has been seeing the chiropractor with no significant change noted  Mastication decreases headache pain  She had sinus imaging with no limitations noted  No MVA, however, fell down the stairs 1 5 yrs ago carrying a piece of furniture  She does not sleep well, and uses a wedge for sleeping due to GERD    Better when she gets up in the morning  7/15  HA not notable with waking, increasing after 2-3 hours of being up  Increased right sided neck pain  Chewing gum continues to help HA  Pain  Current pain ratin  At best pain ratin  Location: HA posterior neck up into sinuses and front of face, burning of tongue is noted    Social Support  Steps to enter house: yes  4  Stairs in house: yes   14  Lives in: multiple-level home  Lives with: spouse    Employment status: not working  Life stress: works part time occasionally at [employer]          Objective     Concurrent Complaints  Positive for headaches  Strength/Myotome Testing     Left Shoulder     Planes of Motion   Flexion: 5   Abduction: 5     Right Shoulder     Planes of Motion   Flexion: 5   Abduction: 5     Left Elbow   Flexion: 5  Extension: 5    Right Elbow   Flexion: 5  Extension: 5    Left Wrist/Hand   Wrist extension: 5  Wrist flexion: 5    Right Wrist/Hand   Wrist extension: 5  Wrist flexion: 5    Left Hip   Planes of Motion   Flexion: 5  Extension: 5  Abduction: 5  External rotation: 5    Right Hip   Planes of Motion   Flexion: 5  Extension: 5  Abduction: 5  External rotation: 5    Left Knee   Flexion: 5  Extension: 5    Right Knee   Flexion: 5  Extension: 5    Left Ankle/Foot   Dorsiflexion: 5  Plantar flexion: 5    Right Ankle/Foot   Dorsiflexion: 5  Plantar flexion: 5  Neuro Exam:     Headaches   Patient reports headaches: Yes  Frequency: constant fluctuating in intensity    Sensation   Light touch LE: left WNL and right WNL    Coordination   Heel to shin: left WNL and right WNL  Finger to nose: left WNL and right WNL  Rapid alternating movements: UE WNL and LE impaired     Posture: Forward head and shoulder  Cervical:  Rotation range has improved, end range remains tight with ":pulling" noted  Soft Tissue:  Increased muscle guarding with increased SCM, UT, Scalenes, and suboccipital musculature    Point tenderness noted with increased sinus/jaw pain noted with lateral neck release  Referral pain evident with suboccipital release with decreased frontal pressure post release  Precautions:   Worsening MENJIVAR, GERD  EPOC:  8/4/2022      Manuals 6/9 7/15           TPR SCM, Scalenes, UT, Lev VM VM           Suboccipital release VM                                      Neuro Re-Ed             Postural educ-use of lumbar roll VM reviewed                                                                                         Ther Ex             SNAGS 10" x4ea            Cervical retraction with towel 4 4                                                                                         Ther Activity                                       Gait Training                                       Modalities             KT Paper off tension, I band inhibition UT B/L VM

## 2022-07-19 LAB
BUN SERPL-MCNC: 15 MG/DL (ref 7–25)
CREAT SERPL-MCNC: 0.68 MG/DL (ref 0.6–1)
GFR/BSA.PRED SERPLBLD CYS-BASED-ARV: 92 ML/MIN/1.73M2

## 2022-08-05 ENCOUNTER — TELEPHONE (OUTPATIENT)
Dept: NEUROLOGY | Facility: CLINIC | Age: 72
End: 2022-08-05

## 2022-08-05 NOTE — TELEPHONE ENCOUNTER
----- Message from Mukul Sandoval MD sent at 8/1/2022 10:28 AM EDT -----  Regarding: FW: tpi  Heamy    where are we in terms of setting up TPI for [facility]?  ----- Message -----  From: KATHIE Pino  Sent: 7/25/2022  12:25 PM EDT  To: Mukul Sandoval MD, Christ Dillard PA-C  Subject: tpi                                              Wondering if anyone has availability for trigger point injections for this patient as it might help her symptoms  Also, Dr Sebastian Epperson I vaguely remember you saying you were going to potentially set up some sort of instructional clinic for trigger points/nerve blocks etc- I would like in on this if it happens    Thanks,  Divine Kebede

## 2022-08-05 NOTE — TELEPHONE ENCOUNTER
EMELYN Dillon MD; P [facility] Clinical Team 4; Leola Rosas PA-C; KATHIE Trujillo; [facility] - [facility] [phone] clerical- can schedule next available or wait list for me, and David Blanco I can show you how next time I am in [place] and if I have a pt if you would like

## 2022-08-11 ENCOUNTER — HOSPITAL ENCOUNTER (OUTPATIENT)
Dept: MRI IMAGING | Facility: HOSPITAL | Age: 72
Discharge: HOME | End: 2022-08-11
Payer: MEDICARE

## 2022-08-11 DIAGNOSIS — K14.6 TONGUE PAIN: ICD-10-CM

## 2022-08-11 DIAGNOSIS — M54.81 BILATERAL OCCIPITAL NEURALGIA: ICD-10-CM

## 2022-08-11 DIAGNOSIS — R51.9 FACIAL PAIN: ICD-10-CM

## 2022-08-11 DIAGNOSIS — G44.86 CERVICOGENIC HEADACHE: ICD-10-CM

## 2022-08-11 PROCEDURE — A9585 GADOBUTROL INJECTION: HCPCS | Performed by: NURSE PRACTITIONER

## 2022-08-11 PROCEDURE — G1004 CDSM NDSC: HCPCS

## 2022-08-11 PROCEDURE — 72141 MRI NECK SPINE W/O DYE: CPT

## 2022-08-11 PROCEDURE — 70553 MRI BRAIN STEM W/O & W/DYE: CPT

## 2022-08-11 RX ADMIN — GADOBUTROL 6 ML: 604.72 INJECTION INTRAVENOUS at 13:25

## 2022-08-15 ENCOUNTER — TELEMEDICINE (OUTPATIENT)
Dept: FAMILY MEDICINE CLINIC | Facility: CLINIC | Age: 72
End: 2022-08-15
Payer: MEDICARE

## 2022-08-15 VITALS — HEIGHT: 59 IN | BODY MASS INDEX: 26.81 KG/M2 | WEIGHT: 133 LBS

## 2022-08-15 DIAGNOSIS — U07.1 POSITIVE SELF-ADMINISTERED ANTIGEN TEST FOR COVID-19: Primary | ICD-10-CM

## 2022-08-15 DIAGNOSIS — E78.00 HYPERCHOLESTEREMIA: ICD-10-CM

## 2022-08-15 PROCEDURE — 99442 PR PHYS/QHP TELEPHONE EVALUATION 11-20 MIN: CPT | Performed by: NURSE PRACTITIONER

## 2022-08-15 RX ORDER — ATORVASTATIN CALCIUM 20 MG/1
20 TABLET, FILM COATED ORAL DAILY
Qty: 30 TABLET | Refills: 1 | Status: SHIPPED | OUTPATIENT
Start: 2022-08-15

## 2022-08-15 NOTE — PROGRESS NOTES
Virtual Regular Visit    Verification of patient location:    Patient is located in the following state in which I hold an active license [place]      Assessment/Plan:    Problem List Items Addressed This Visit    None     Visit Diagnoses     Positive self-administered antigen test for COVID-19    -  Primary        OTC cold medications as directed to treat your  mild symptoms  Vit C, D, Zinc as directed for immunity health  Isolate as directed  Call with any problems concerns  Depression Screening and Follow-up Plan: Patient was screened for depression during today's encounter  They screened negative with a PHQ-2 score of 0  Reason for visit is   Chief Complaint   Patient presents with    COVID-19     Pt has a VV  Pt tested positive on 08/15/2022  Pt's stated sx: sore throat, cough, congestion in the chest, body aches, headache, mild fever  Pt denies GI issues   Virtual Regular Visit        Encounter provider KATHIE Ruiz    Provider located at [address] [place] [id number]      Recent Visits  No visits were found meeting these conditions  Showing recent visits within past 7 days and meeting all other requirements  Today's Visits  Date Type Provider Dept   08/15/22 Telemedicine KATHIE Giles [facility]   Showing today's visits and meeting all other requirements  Future Appointments  No visits were found meeting these conditions  Showing future appointments within next 150 days and meeting all other requirements       The patient was identified by name and date of birth  Saeed Quinonez was informed that this is a telemedicine visit and that the visit is being conducted through Telephone  My office door was closed  No one else was in the room  She acknowledged consent and understanding of privacy and security of the video platform   The patient has agreed to participate and understands they can discontinue the visit at any time  Patient is aware this is a billable service  Nikkie Wei is a 67 y o  female    positive at home covid test today  Started with a headache 2-3 days ago followed by congestion, cough  Symptoms are mild  No fever, no shortness of breath  Patient is fully vaccinated  She has not tried any otc medications  Attempted to connect via amwell through my-chart, patient was unsuccessful connecting through video  Past Medical History:   Diagnosis Date    Anxiety     Arthritis     Baker cyst, left     Cholelithiasis     Chronic headaches     GERD (gastroesophageal reflux disease)     Heart murmur     Menorrhagia     Sinus infection        Past Surgical History:   Procedure Laterality Date    CHOLECYSTECTOMY      IR ASPIRATION BAKERS CYST Left     LAPAROSCOPIC CHOLECYSTECTOMY         Current Outpatient Medications   Medication Sig Dispense Refill    atorvastatin (LIPITOR) 20 mg tablet Take 1 tablet (20 mg total) by mouth daily 30 tablet 5    famotidine (PEPCID) 40 MG tablet Take 1 tablet (40 mg total) by mouth daily at bedtime (Patient taking differently: Take 20 mg by mouth daily at bedtime) 30 tablet 5     No current facility-administered medications for this visit  No Known Allergies    Review of Systems   Constitutional: Negative for activity change, chills, diaphoresis, fatigue and fever  HENT: Positive for congestion, postnasal drip and rhinorrhea  Negative for ear pain, sinus pressure and sore throat  Eyes: Negative for pain, discharge and redness  Respiratory: Positive for cough  Negative for chest tightness, shortness of breath and wheezing  Cardiovascular: Negative for chest pain  Gastrointestinal: Negative for constipation, diarrhea, nausea and vomiting  Musculoskeletal: Negative for myalgias  Skin: Negative for rash  Neurological: Positive for headaches  Negative for dizziness  Video Exam    Vitals:    08/15/22 0935   Weight: 60 3 kg (133 lb)   Height: 4' 11" (1 499 m)       Physical Exam  Vitals reviewed: patient was unable to connect via [employer] for video visit            I spent 15 minutes directly with the patient during this visit

## 2022-08-18 ENCOUNTER — APPOINTMENT (OUTPATIENT)
Dept: PHYSICAL THERAPY | Facility: CLINIC | Age: 72
End: 2022-08-18
Payer: MEDICARE

## 2022-08-22 ENCOUNTER — TELEPHONE (OUTPATIENT)
Dept: PHYSICAL THERAPY | Facility: CLINIC | Age: 72
End: 2022-08-22

## 2022-08-22 ENCOUNTER — TELEPHONE (OUTPATIENT)
Dept: NEUROLOGY | Facility: CLINIC | Age: 72
End: 2022-08-22

## 2022-08-22 NOTE — TELEPHONE ENCOUNTER
Pt left a vm today at 2:33 for brain MRI results   [phone]  Called pt and advised per brain MRI result notes-  No acute finding on MRI brain-no nerve compression  Exostosis-small benign bony outgrowth that is not causing any compression or problem  She verbalized understanding

## 2022-08-26 ENCOUNTER — OFFICE VISIT (OUTPATIENT)
Dept: PHYSICAL THERAPY | Facility: CLINIC | Age: 72
End: 2022-08-26
Payer: MEDICARE

## 2022-08-26 DIAGNOSIS — Z12.11 COLON CANCER SCREENING: ICD-10-CM

## 2022-08-26 DIAGNOSIS — G44.86 CERVICOGENIC HEADACHE: Primary | ICD-10-CM

## 2022-08-26 DIAGNOSIS — J31.0 CHRONIC RHINITIS: ICD-10-CM

## 2022-08-26 PROCEDURE — 97140 MANUAL THERAPY 1/> REGIONS: CPT

## 2022-08-26 PROCEDURE — 97110 THERAPEUTIC EXERCISES: CPT

## 2022-08-26 NOTE — PROGRESS NOTES
PT DISCHARGE / DAILY NOTE    Today's date: 2022  Patient name: Tanda Bosworth  : 1950  MRN: [id number]  Referring provider: KATHIE Pastor  Dx:   Encounter Diagnosis     ICD-10-CM    1  Cervicogenic headache  G44 86    2  Colon cancer screening  Z12 11    3  Chronic rhinitis  J31 0        Start Time: 1230  Stop Time: 1315  Total time in clinic (min): 45 minutes    Assessment  Assessment details: Patient presents to PT with worsening HA not alleviated by medication, or recent chiropractor care  She exhibits poor postural positioning with protracted cervical position and slight rotation to left  Abnormal soft tissue density is noted with point tenderness evident and referral patterns consistent with SCM, suboccipital and trapezius  STM with changes to "sinus" pain noted  No muscle weakness or gait disturbance is noted  Patient will benefit from skilled intervention to facilitate trigger point release, postural education to limit cervical strain and education to allow for improved self management of symptoms with improved activity tolerance  Patient is an excellent rehab candidate, however, is limited in her ability to consistently attend PT due to golf schedule  Patient is presently scheduled for PT once a week, further assessment will follow if more frequent intervention is needed to attain goals  Patient is in agreement with treatment plan  Thank you for this referral     7/15  Patient with improved cervical rotation and improved soft tissue restriction in cervical region  HA continue to be reduced in am upon awaking and worsen t/o day  To date, no positioning consistently reduces pain during day, however, patient admits to not lying supine when symptoms start to worsen  Discussed positioning and attention to when symptoms worsen to allow for better determination of aggravating factors    Patient has noticed burning in her tongue and continued jaw pressure,  Pointing to zygomatic arch   She has seen neurology and discussed possible TMJ influence, however, patient has not complained of jaw pain and continues to feel better with chewing gum  Patient is now scheduled for cervical and brain MRI  Recommended hold on PT until after results  Patient has been instructed to contact PT should her symptoms worsen  She is in agreement with treatment plan  8/26  Patient returns to PT after hiatus to allow for MRI and follow-up to gain further insight into source of pain  Per patient, she continues to experience relief with gum chewing and does feel less pain with supine positioning better in morning and worse by end of day  STM continues to be effective in reducing HA pain and referral pain pattern remains consistent for SCM distribution  Despite benefits of STM, reduction in HA is temporary  Due to no significant prolonged decrease in pain, discharge from PT is recommended  Discussed with patient pursuit of further ENT evaluation and follow-up with Primary MD to discuss possible pain management for HA  Patient is in agreement with discharge plans and indicates she will keep PT informed of any further progress in pain management  Positioning to reduce pain continues to be recommended  Patient voiced understanding to all instructions  Goals  STG  (4 weeks)  Patient will be independent in self-correction of posture to avoid cervical strain- partially met                             Patient will be independent in basic HEP for cervical retraction, thoracic extension stretch and SNAGS- MET                             Patient will report decrease in HA with decreased pain noted at end of day by 1-2 PPS- not met    LTG (8weeks)  Patient will report decreased HA with functional tasks achieving predicted FOTO scoring- improved FOTO scoring per patient now able to better manage her symptoms pacing her activities                          Patient will be independent in self-release for pain management- partially met                         Patient will determine if continued use of KT is assistive in continued pain relief- held    Plan  Treatment plan discussed with: patient        Subjective Evaluation    History of Present Illness  Mechanism of injury: Patient has been experiencing increased HA over the last several months  She has been seeing the chiropractor with no significant change noted  Mastication decreases headache pain  She had sinus imaging with no limitations noted  No MVA, however, fell down the stairs 1 5 yrs ago carrying a piece of furniture  She does not sleep well, and uses a wedge for sleeping due to GERD  Better when she gets up in the morning  7/15  HA not notable with waking, increasing after 2-3 hours of being up  Increased right sided neck pain  Chewing gum continues to help HA         Patient states HA continues to be noted, and she continues to indicate no HA in am, progress increase in HA over day  Chewing gum continues to be assistive  Pain  Current pain ratin  At best pain ratin  Location: HA posterior neck up into sinuses and front of face, burning of tongue is noted    Social Support  Steps to enter house: yes  4  Stairs in house: yes   14  Lives in: multiple-level home  Lives with: spouse    Employment status: not working  Life stress: works part time occasionally at [employer]          Objective     Concurrent Complaints  Positive for headaches       Strength/Myotome Testing     Left Shoulder     Planes of Motion   Flexion: 5   Abduction: 5     Right Shoulder     Planes of Motion   Flexion: 5   Abduction: 5     Left Elbow   Flexion: 5  Extension: 5    Right Elbow   Flexion: 5  Extension: 5    Left Wrist/Hand   Wrist extension: 5  Wrist flexion: 5    Right Wrist/Hand   Wrist extension: 5  Wrist flexion: 5    Left Hip   Planes of Motion   Flexion: 5  Extension: 5  Abduction: 5  External rotation: 5    Right Hip   Planes of Motion   Flexion: 5  Extension: 5  Abduction: 5  External rotation: 5    Left Knee   Flexion: 5  Extension: 5    Right Knee   Flexion: 5  Extension: 5    Left Ankle/Foot   Dorsiflexion: 5  Plantar flexion: 5    Right Ankle/Foot   Dorsiflexion: 5  Plantar flexion: 5  Neuro Exam:     Headaches   Patient reports headaches: Yes  Frequency: constant fluctuating in intensity    Sensation   Light touch LE: left WNL and right WNL    Coordination   Heel to shin: left WNL and right WNL  Finger to nose: left WNL and right WNL  Rapid alternating movements: UE WNL and LE impaired     Posture: Forward head and shoulder  8/26  Improved overall postural awareness with encouragement to avoid forward head with use of lumbar roll and improved stance positioning  Cervical:  Rotation range has improved, end range remains tight with ":pulling" noted  Soft Tissue:  Increased muscle guarding with increased SCM, UT, Scalenes, and suboccipital musculature  Point tenderness noted with increased sinus/jaw pain noted with lateral neck release  Referral pain evident with suboccipital release with decreased frontal pressure post release  8/26  Referral pain pattern for SCM continues to be noted with assistive decrease in pain and HA with STM  Precautions:   Worsening MENJIVAR, GERD  EPOC:  8/4/2022      Manuals 6/9 7/15 8/26          TPR SCM, Scalenes, UT, Lev VM VM VM          Suboccipital release VM  VM                                    Neuro Re-Ed             Postural educ-use of lumbar roll VM reviewed reviewed including stance posture and positioning during day activities to avoid increased forward flexion                                                                                        Ther Ex             SNAGS 10" x4ea            Cervical retraction with towel 4 4 5          Scap retraction   4x2          Thoracic rotation   5          Thoracic extension   5                                                 Ther Activity Gait Training                                       Modalities             KT Paper off tension, I band inhibition UT B/L VM

## 2022-10-05 ENCOUNTER — TELEPHONE (OUTPATIENT)
Dept: NEUROLOGY | Facility: CLINIC | Age: 72
End: 2022-10-05

## 2022-10-05 NOTE — TELEPHONE ENCOUNTER
Called and spoke to patient - confirmed upcoming appointment with Fabiano Galan on 10/13/22 8:45 am at the [facility] office  Provided patient with apt date, time and location  Informed patient that check in is at least 15 minutes prior to apt time  The patient is not  having any issues or concerns at this time

## 2022-10-11 NOTE — PROGRESS NOTES
----- Message from KAROLYN Burroughs sent at 10/11/2022  9:38 AM CDT -----  Please call with negative strep. Thank you    [id number] - Outpatient Follow-up Note  El Rudd 70 y o  female MRN: [id number]  Encounter: [id number]    ASSESSMENT AND PLAN:      1  Gastroesophageal reflux disease without esophagitis  Symptoms well controlled on famotidine 40 mg at night  She would like to attempt to cut that dose ache and half  If symptoms return she will resume the 40 mg  She continues to follow a reflux diet  2  Screening for colon cancer  Patient has had negative Cologuard screening test   Next call colonoscopy or Cologuard is due in   Followup Appointment: 6 months  ______________________________________________________________________    Chief Complaint   Patient presents with    Follow-up GERD     HPI:  77-year-old female presents for follow-up on GERD  Symptoms have been well controlled on famotidine 40 mg daily  Denies any breakthrough symptoms  Denies any dysphagia  She does admit to slight discomfort in her epigastric region just for 1-2 seconds after eating however that quickly resolve  Denies any weight loss and her appetite is good  Denies any change in her bowel habits      Historical Information   Past Medical History:   Diagnosis Date    Anxiety     Arthritis     Baker cyst, left     Cholelithiasis     Chronic headaches     GERD (gastroesophageal reflux disease)     Menorrhagia     Sinus infection      Past Surgical History:   Procedure Laterality Date    CHOLECYSTECTOMY      IR ASPIRATION BAKERS CYST Left     LAPAROSCOPIC CHOLECYSTECTOMY       Social History     Substance and Sexual Activity   Alcohol Use Yes     Social History     Substance and Sexual Activity   Drug Use No     Social History     Tobacco Use   Smoking Status Former Smoker    Packs/day: 0 50    Years: 20 00    Pack years: 10 00    Types: Cigarettes    Quit date: 1999    Years since quittin 1   Smokeless Tobacco Never Used     Family History   Problem Relation Age of Onset    Breast cancer Mother 46    Lung cancer Mother     Diabetes Mother     Prostate cancer Brother     No Known Problems Paternal Aunt     Stroke Father     No Known Problems Maternal Grandmother     No Known Problems Maternal Grandfather     No Known Problems Paternal Grandmother     No Known Problems Paternal Grandfather     No Known Problems Brother     No Known Problems Brother     No Known Problems Brother     No Known Problems Brother     Lymphoma Brother     Substance Abuse Neg Hx     Mental illness Neg Hx     Colon cancer Neg Hx     Colon polyps Neg Hx          Current Outpatient Medications:     amoxicillin (AMOXIL) 500 mg capsule    famotidine (PEPCID) 40 MG tablet    rosuvastatin (CRESTOR) 5 mg tablet  No Known Allergies  Reviewed medications and allergies and updated as indicated    PHYSICAL EXAM:    Blood pressure 120/80, height 5' (1 524 m), weight 63 kg (139 lb), not currently breastfeeding  Body mass index is 27 15 kg/m²  General Appearance: NAD, cooperative, alert  Eyes: Anicteric, PERRLA, EOMI  ENT:  Normocephalic, atraumatic, normal mucosa  Neck:  Supple, symmetrical, trachea midline  Resp:  Clear to auscultation bilaterally; no rales, rhonchi or wheezing; respirations unlabored   CV:  S1 S2, Regular rate and rhythm; no murmur, rub, or gallop  GI:  Soft, non-tender, non-distended; normal bowel sounds; no masses, no organomegaly   Rectal: Deferred  Musculoskeletal: No cyanosis, clubbing or edema  Normal ROM    Skin:  No jaundice, rashes, or lesions   Heme/Lymph: No palpable cervical lymphadenopathy  Psych: Normal affect, good eye contact  Neuro: No gross deficits, AAOx3    Lab Results:   Lab Results   Component Value Date    WBC 5 5 10/12/2020    HGB 12 9 10/12/2020    HCT 38 5 10/12/2020    MCV 93 9 10/12/2020     10/12/2020     Lab Results   Component Value Date     09/06/2017    K 4 5 11/05/2021     11/05/2021    CO2 28 11/05/2021    BUN 13 11/05/2021    CREATININE 0 62 11/05/2021    CALCIUM 9 5 11/05/2021    AST 24 11/05/2021    ALT 19 11/05/2021    ALKPHOS 72 11/05/2021    PROT 6 7 09/06/2017    BILITOT 1 0 09/06/2017     No results found for: IRON, TIBC, FERRITIN  No results found for: LIPASE    Radiology Results:   No results found

## 2022-10-12 PROBLEM — Z12.11 COLON CANCER SCREENING: Status: RESOLVED | Noted: 2021-03-25 | Resolved: 2022-10-12

## 2022-10-13 ENCOUNTER — OFFICE VISIT (OUTPATIENT)
Dept: NEUROLOGY | Facility: CLINIC | Age: 72
End: 2022-10-13
Payer: MEDICARE

## 2022-10-13 VITALS
TEMPERATURE: 97.9 F | BODY MASS INDEX: 26.31 KG/M2 | RESPIRATION RATE: 16 BRPM | HEIGHT: 60 IN | WEIGHT: 134 LBS | DIASTOLIC BLOOD PRESSURE: 61 MMHG | HEART RATE: 66 BPM | SYSTOLIC BLOOD PRESSURE: 123 MMHG

## 2022-10-13 DIAGNOSIS — M54.2 NECK PAIN: ICD-10-CM

## 2022-10-13 DIAGNOSIS — G44.86 CERVICOGENIC HEADACHE: ICD-10-CM

## 2022-10-13 DIAGNOSIS — M50.30 DEGENERATIVE DISC DISEASE, CERVICAL: Primary | ICD-10-CM

## 2022-10-13 DIAGNOSIS — R51.9 FACIAL PAIN: ICD-10-CM

## 2022-10-13 PROCEDURE — 99213 OFFICE O/P EST LOW 20 MIN: CPT | Performed by: NURSE PRACTITIONER

## 2022-10-13 RX ORDER — MAGNESIUM 30 MG
30 TABLET ORAL 2 TIMES DAILY
COMMUNITY

## 2022-10-13 NOTE — PATIENT INSTRUCTIONS
Continue with exercises  Pain management referral  Let me know if you would like to trial a different medication like cymbalta- information provided  Follow up in 6 months    Duloxetine (By mouth)   Duloxetine (doo-LOX-e-teen)  Treats depression, anxiety, diabetic peripheral neuropathy, fibromyalgia, and chronic muscle or bone pain  This medicine is an SSNRI  Brand Name(s): Cymbalta, Drizalma Sprinkle, Irenka   There may be other brand names for this medicine  When This Medicine Should Not Be Used: This medicine is not right for everyone  Do not use it if you had an allergic reaction to duloxetine  How to Use This Medicine:   Capsule, Delayed Release Capsule  Take your medicine as directed  Your dose may need to be changed several times to find what works best for you  Delayed-release capsule: Swallow the capsule whole  Do not crush, chew, break, or open it  Do not open the Cymbalta® delayed-release capsule and sprinkle the contents on food or in liquids  If you have trouble swallowing the Adine Pakistani delayed release capsule: You may open the capsule and sprinkle the contents over one tablespoon (15 mL) of applesauce  Swallow the mixture right away and do not save any of the mixture to use later  You may open the capsule and pour the contents to an all plastic catheter tip syringe and add 50 mL of water  Do not use other liquids  Gently shake it for 10 seconds, and then use it through a nasogastric tube  Rinse with additional water (about 15 mL) if needed  This medicine should come with a Medication Guide  Ask your pharmacist for a copy if you do not have one  Missed dose: Take a dose as soon as you remember  If it is almost time for your next dose, wait until then and take a regular dose  Do not take extra medicine to make up for a missed dose  Store the medicine in a closed container at room temperature, away from heat, moisture, and direct light    Drugs and Foods to Avoid:   Ask your doctor or pharmacist before using any other medicine, including over-the-counter medicines, vitamins, and herbal products  Do not take duloxetine if you have used an MAO inhibitor (MAOI) within the past 14 days  Do not start taking an MAO inhibitor within 5 days of stopping duloxetine  Ask your doctor if you are not sure if you take an MAOI, including linezolid or methylene blue injection  Some medicines can affect how duloxetine works  Tell your doctor if you are using any of the following:  Buspirone, cimetidine, ciprofloxacin, enoxacin, fentanyl, fluvoxamine, lithium, Josh's wort, theophylline, tramadol, tryptophan, warfarin  Amphetamines  Blood pressure medicine  Diuretic (water pill)  Medicine for heart rhythm problems (including flecainide, propafenone, quinidine)  Medicine to treat migraine headaches (including triptans)  NSAID pain or arthritis medicine (including aspirin, celecoxib, diclofenac, ibuprofen, naproxen)  Other medicine to treat depression or mood disorders (including amitriptyline, desipramine, fluoxetine, imipramine, nortriptyline, paroxetine)  Phenothiazine medicine (including thioridazine)  Stomach medicine (including famotidine, antacids containing aluminum or magnesium, PPIs)  Tell your doctor if you use anything else that makes you sleepy  Some examples are allergy medicine, narcotic pain medicine, and alcohol  Do not drink alcohol while you are using this medicine  Warnings While Using This Medicine:   Tell your doctor if you are pregnant or breastfeeding, or if you have kidney disease, liver disease, bleeding problems, diabetes, digestion problems, glaucoma, heart disease, high or low blood pressure, or problems with urination  Tell your doctor if you smoke or you have a history of seizures, mental health problems (including bipolar disorder, jerod), or drug or alcohol addiction    This medicine may cause the following problems:   Serious liver problems  Serotonin syndrome, when used with certain medicines  Increased risk of bleeding problems  Serious skin reactions, including erythema multiforme, Hdez-Pb syndrome  Low sodium levels in the blood  Sexual problems  This medicine can increase thoughts of suicide  Tell your doctor right away if you start to feel depressed and have thoughts about hurting yourself  This medicine may cause blurred vision, dizziness, drowsiness, trouble with thinking, or trouble with controlling body movements, which may lead to falls, fractures, or other injuries  Do not drive or do anything that could be dangerous until you know how this medicine affects you  Stand up slowly to avoid falls  Do not stop using this medicine suddenly  Your doctor will need to slowly decrease your dose before you stop it completely  Your doctor will check your progress and the effects of this medicine at regular visits  Keep all appointments  Keep all medicine out of the reach of children  Never share your medicine with anyone  Possible Side Effects While Using This Medicine:   Call your doctor right away if you notice any of these side effects:   Allergic reaction: Itching or hives, swelling in your face or hands, swelling or tingling in your mouth or throat, chest tightness, trouble breathing  Anxiety, restlessness, fever, fast heartbeat, sweating, muscle spasms, diarrhea, seeing or hearing things that are not there  Blistering, peeling, red skin rash  Confusion, weakness, muscle twitching  Dark urine or pale stools, nausea, vomiting, loss of appetite, stomach pain, yellow skin or eyes  Decrease in how much or how often you urinate  Decrease in sexual ability, desire, drive, or performance  Eye pain, vision changes, seeing halos around lights  Feeling more energetic than usual  Lightheadedness, dizziness, fainting  Unusual moods or behaviors, worsening depression, thoughts about hurting yourself, trouble sleeping  Unusual bleeding or bruising  If you notice these less serious side effects, talk with your doctor:   Decrease in appetite or weight  Dry mouth, constipation, mild nausea  Headache  Unusual drowsiness, sleepiness, or tiredness  If you notice other side effects that you think are caused by this medicine, tell your doctor  Call your doctor for medical advice about side effects  You may report side effects to FDA at 6-564-FDA-2639    © Copyright [employer] 2022 Information is for End User's use only and may not be sold, redistributed or otherwise used for commercial purposes  The above information is an  only  It is not intended as medical advice for individual conditions or treatments  Talk to your doctor, nurse or pharmacist before following any medical regimen to see if it is safe and effective for you

## 2022-10-13 NOTE — PROGRESS NOTES
Patient ID: Kelly Mallory is a 67 y o  female  Assessment/Plan:  We discussed the MRI findings  We discussed treatment options- I suggested a pain management referral and a trial of a different neuropathic pain medication (cymbalta)  We reviewed side effects and she decided she would prefer to continue with topical agents and see pain management first before trying another oral medication  She was encouraged to reach out to the office at any time if circumstances change  We did discuss the need to follow up with primary care regarding the incidental finding of thyroid nodule  Diagnoses and all orders for this visit:    Degenerative disc disease, cervical  -     Ambulatory Referral to Pain Management; Future    Cervicogenic headache  -     Ambulatory Referral to Pain Management; Future    Facial pain    Neck pain    Other orders  -     magnesium 30 MG tablet; Take 30 mg by mouth 2 (two) times a day    Subjective:  Lucy Todd presents for follow up and MRI review  She states no real change to her headaches  She does state that topical medication has been somewhat helpful but that insurance doesn't cover it so it is limited in supply  She has had some increase in psoriasis lately and saw her dermatologist who started her on a injection (unsure of the name) and this is not helping yet  She mentions not being too good at taking pills everyday and previously had side effects from gabapentin  She was in PT which was only mildly effective  MRI brain 8/11/2022:  IMPRESSION:  1  No acute infarction, edema, or pathologic intra-axial enhancement  2  Small bony exostosis along the posterior left petrous ridge extending into the left cerebellopontine angle without mass effect  No suspicious CP angle lesion    MRI C spine:  IMPRESSION:  1  Grade 1 degenerative anterolisthesis at the C3-4 and C4-5 levels with secondary unroofing of the posterior disc and diffuse disc bulge causing mild central canal narrowing    2  Diffuse disc osteophyte complexes are noted at the C5-6 and C6-7 levels with mild central canal narrowing at C5-6  Moderate to severe right C5-6 neural foraminal narrowing  3  The cervical cord appears normal in caliber and signal with no focal impingement  4   Incidental right thyroid nodule, further characterization with thyroid ultrasound is recommended  HPI   Since you last visit are your headaches Remained the Same  Are you taking your current medications as prescribed? yes  Do you have any side effects? no  How often do you use abortive medications to treat a headache? 1 times per month (tylenol)  How effective are they? ineffective  From 0-10, how severe is the pain for a typical headache for you? 8  What does your typical headache pain feel like? pressure  Where is your typical headache? nuchal and Entire head  What is your current headache frequency: Constant times per day  How long does your typical headache last? 1 day, constant  In addition to the head pain, what other symptoms do you have before or during your headaches? Stiffness of neck and shoulder  Do you have anything you need to discuss with the doctor during your visit? MRI Results  Previous History:  Cedric Fleischer is a 70year old right handed female with past medical history of arthritis, bakers cyst, gerd, hyperlipidemia, chronic sleep problems who presents today for new patient evaluation of headache/facial pain  Referred by ENT physician; also has seen PCP and chiropractor for evaluation  She states headaches were intermittent/not too bothersome since age 39  In the past 2-3 months she has had increased pain in her neck and has been having daily headaches- no injury or inciting event  Headaches start in occipital regions and radiate to bitemporal regions R>L  She does have some tenderness to temple  She does report pins and needles feeling in mouth and pressure in face that is constant/unable to be triggered   She denies TMJ tenderness  Interestingly she states chewing gum really helps her pain in her mouth  She has point tenderness to the back of the head  Headaches are accompanied by lightheadedness at times but no nausea, vomiting, photophobia, vision change (history of lasik surgery), fever, swallowing trouble/speech trouble, or persistent paresthesias/pain in her upper extremities  She states occasionally she will have tingling in her hands when she wakes up but this never lasts  She has used over the counter NSAID's with only some relief; heat helps some; never tried ice/other topicals    She has been going to the chiropractor 2x/week for past 6 weeks without much relief- states they did xrays of the neck there which show abnormal curvature (I do not have this to review)  She lives with her ; 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); she enjoys golfing/staying active  She states doesn't drink as much water as she should; she does use small amount of caffeine and drinks maybe 1 glass of wine/week  She states her sleep is same as it has always been- gets about 2-3 hours at a time, maybe 6-7 hours total/night; doesn't necessarily wake because of pain  The following portions of the patient's history were reviewed and updated as appropriate: allergies, current medications, past family history, past medical history, past social history, past surgical history and problem list          Objective:    Blood pressure 123/61, pulse 66, temperature 97 9 °F (36 6 °C), temperature source Temporal, resp  rate 16, height 5' (1 524 m), weight 60 8 kg (134 lb), not currently breastfeeding  Physical Exam  Vitals reviewed  Constitutional:       General: She is not in acute distress  Appearance: She is normal weight  She is not ill-appearing, toxic-appearing or diaphoretic  HENT:      Head: Normocephalic and atraumatic        Right Ear: External ear normal       Left Ear: External ear normal       Nose: Nose normal       Mouth/Throat:      Mouth: Mucous membranes are moist       Pharynx: Oropharynx is clear  Eyes:      General: Lids are normal       Extraocular Movements: Extraocular movements intact  Pupils: Pupils are equal, round, and reactive to light  Cardiovascular:      Rate and Rhythm: Normal rate and regular rhythm  Pulses: Normal pulses  Heart sounds: Normal heart sounds  Pulmonary:      Effort: Pulmonary effort is normal       Breath sounds: Normal breath sounds  Abdominal:      General: There is no distension  Musculoskeletal:      Cervical back: No rigidity  Pain with movement and muscular tenderness present  Decreased range of motion  Right lower leg: No edema  Left lower leg: No edema  Skin:     Findings: Lesion present  Comments: Behind left ear-patchy area consistent with provided history of psoriasis   Neurological:      General: No focal deficit present  Mental Status: She is alert  Coordination: Romberg sign negative  Deep Tendon Reflexes:      Reflex Scores:       Brachioradialis reflexes are 1+ on the right side and 1+ on the left side  Patellar reflexes are 2+ on the right side and 2+ on the left side  Psychiatric:         Mood and Affect: Mood normal          Speech: Speech normal          Behavior: Behavior normal          Neurological Exam  Mental Status  Alert  Oriented to person, place and time  Speech is normal  Language is fluent with no aphasia  Cranial Nerves  CN II: Visual acuity is normal  Visual fields full to confrontation  CN III, IV, VI: Extraocular movements intact bilaterally  Normal lids and orbits bilaterally  Pupils equal round and reactive to light bilaterally  CN V: Facial sensation is normal   CN VII: Full and symmetric facial movement  CN VIII: Hearing is normal   CN IX, X: Palate elevates symmetrically  Normal gag reflex    CN XI: Shoulder shrug strength is normal   CN XII: Tongue midline without atrophy or fasciculations  Motor  Normal muscle bulk throughout  Sensory  Light touch is normal in upper and lower extremities  Reflexes                                            Right                      Left  Brachioradialis                    1+                         1+  Patellar                                2+                         2+    Coordination  Right: Rapid alternating movement normal Left: Rapid alternating movement normal     Gait  Casual gait is normal including stance, stride, and arm swing  Romberg is absent  Able to rise from chair without using arms  ROS:    Review of Systems   Constitutional: Negative  Negative for appetite change and fever  HENT: Negative  Negative for hearing loss, tinnitus, trouble swallowing and voice change  Eyes: Negative  Negative for photophobia, pain and visual disturbance  Respiratory: Negative  Negative for shortness of breath  Cardiovascular: Negative  Negative for palpitations  Gastrointestinal: Negative  Negative for nausea and vomiting  Endocrine: Negative  Negative for cold intolerance  Genitourinary: Negative  Negative for dysuria, frequency and urgency  Musculoskeletal: Negative  Negative for gait problem, myalgias and neck pain  Skin: Negative  Negative for rash  Allergic/Immunologic: Negative  Neurological: Positive for headaches  Negative for dizziness, tremors, seizures, syncope, facial asymmetry, speech difficulty, weakness, light-headedness and numbness  Hematological: Negative  Does not bruise/bleed easily  Psychiatric/Behavioral: Negative  Negative for confusion, hallucinations and sleep disturbance     ROS was reviewed and updated as appropriate

## 2022-11-02 ENCOUNTER — CONSULT (OUTPATIENT)
Dept: PAIN MEDICINE | Facility: CLINIC | Age: 72
End: 2022-11-02

## 2022-11-02 VITALS
BODY MASS INDEX: 26.31 KG/M2 | SYSTOLIC BLOOD PRESSURE: 130 MMHG | DIASTOLIC BLOOD PRESSURE: 82 MMHG | HEIGHT: 60 IN | WEIGHT: 134 LBS | HEART RATE: 84 BPM | TEMPERATURE: 98.7 F

## 2022-11-02 DIAGNOSIS — M47.812 CERVICAL SPONDYLOSIS: ICD-10-CM

## 2022-11-02 DIAGNOSIS — M50.30 DEGENERATIVE DISC DISEASE, CERVICAL: ICD-10-CM

## 2022-11-02 DIAGNOSIS — R93.7 ABNORMAL MRI, CERVICAL SPINE: Primary | ICD-10-CM

## 2022-11-02 DIAGNOSIS — G44.86 CERVICOGENIC HEADACHE: ICD-10-CM

## 2022-11-02 NOTE — PROGRESS NOTES
Assessment  1  Abnormal MRI, cervical spine    2  Degenerative disc disease, cervical    3  Cervicogenic headache    4  Cervical spondylosis        Plan    Aida's neck pain persists despite time, relative rest, activity modification and therapy  Based on the patient's symptoms and examination, I suspect that her pain is being generated by the cervical facet joints  The facet joints are only one of several possible cervical pain generators  Unfortunately, studies have demonstrated that history and examination alone are unreliable  I will schedule the patient for diagnostic cervical medial branch blockade using a double block paradigm  If the patient receives significant pain relief of appropriate duration with bupivacaine 0 25%, we will confirm with bupivacaine  0 75%  If the patient demonstrates appropriate response to medial branch blockade we will schedule for radiofrequency ablation of the blocked nerves to provide long-term pain relief  Secondary to the thyroid nodule and radiology recommendation will obtain thyroid ultrasound  In the office today, we reviewed the nature of the patient's pathology in depth using  diagrams and models  I discussed the approach we would use for the medial branch block and provided literature for home review  The patient understands the risks associated with the procedure including bleeding, infection, tissue injury, allergic reaction and paralysis and provided written and verbal consent in the office today  My impressions and treatment recommendations were discussed in detail with the patient who verbalized understanding and had no further questions  Discharge instructions were provided  I personally saw and examined the patient and I agree with the above discussed plan of care  This note is created using dictation transcription  It may contain typographical errors, grammatical errors, improperly dictated words, background noise and other errors    Orders Placed This Encounter   Procedures   • US thyroid     Standing Status:   Future     Standing Expiration Date:   11/2/2026     Scheduling Instructions:      No prep required  Please bring your insurance cards, a form of photo ID and a list of your medications with you  Arrive 15 minutes prior to your appointment time in order to register  To schedule this appointment, please contact Central Scheduling at [phone]  • [place] spine and pain procedure     Standing Status:   Future     Standing Expiration Date:   11/2/2026     Order Specific Question:   Reason for Exam:     Answer:   cervical mbb with 0 25% bupiv bilateral C3,4,5     Order Specific Question:   Anticoagulant hold needed? Answer:   no     No orders of the defined types were placed in this encounter  Referred By: KATHIE Portillo  History of Present Illness    Carl Dixon is a 67 y o  female three year history of neck shoulder pain and headaches  She underwent chiropractic treatment as well as physical therapy and neurological evaluation  Her symptoms are moderate to severe she rates as 8/10 on the visual analog scale interfering with daily living activities  Pain is nearly constant worse in the evening describes shooting throbbing and achy  Denies any weakness of the upper lower limbs  She reports that lying down as well as chewing gum decreases symptoms  Physical therapy provided moderate but short-term relief chiropractic treatment provided no relief  I have personally reviewed and/or updated the patient's past medical history, past surgical history, family history, social history, current medications, allergies, and vital signs today  Review of Systems   Constitutional: Negative for fever and unexpected weight change  HENT: Negative for trouble swallowing  Eyes: Positive for pain  Negative for visual disturbance  Respiratory: Negative for shortness of breath and wheezing      Cardiovascular: Negative for chest pain and palpitations  Gastrointestinal: Positive for nausea  Negative for constipation, diarrhea and vomiting  Endocrine: Negative for cold intolerance, heat intolerance and polydipsia  Genitourinary: Negative for difficulty urinating and frequency  Musculoskeletal: Positive for arthralgias and myalgias  Negative for gait problem and joint swelling  Skin: Positive for rash  Neurological: Positive for headaches  Negative for dizziness, seizures, syncope and weakness  Hematological: Does not bruise/bleed easily  Psychiatric/Behavioral: Negative for dysphoric mood  All other systems reviewed and are negative        Patient Active Problem List   Diagnosis   • Hypercholesteremia   • Degenerative arthritis of thumb, right   • Abnormal bone density screening   • Arthritis   • Gastroesophageal reflux disease without esophagitis   • Chronic rhinitis   • Cervicogenic headache       Past Medical History:   Diagnosis Date   • Anxiety    • Arthritis    • Baker cyst, left    • Cholelithiasis    • Chronic headaches    • GERD (gastroesophageal reflux disease)    • Heart murmur    • Hyperlipidemia    • Menorrhagia    • Sinus infection        Past Surgical History:   Procedure Laterality Date   • CHOLECYSTECTOMY     • IR ASPIRATION BAKERS CYST Left    • LAPAROSCOPIC CHOLECYSTECTOMY         Family History   Problem Relation Age of Onset   • Breast cancer Mother 46   • Lung cancer Mother    • Diabetes Mother    • Prostate cancer Brother    • No Known Problems Paternal Aunt    • Stroke Father    • No Known Problems Maternal Grandmother    • No Known Problems Maternal Grandfather    • No Known Problems Paternal Grandmother    • No Known Problems Paternal Grandfather    • No Known Problems Brother    • No Known Problems Brother    • No Known Problems Brother    • No Known Problems Brother    • Lymphoma Brother    • Substance Abuse Neg Hx    • Mental illness Neg Hx    • Colon cancer Neg Hx    • Colon polyps Neg Hx Social History     Occupational History   • Not on file   Tobacco Use   • Smoking status: Former Smoker     Packs/day: 0 50     Years: 20 00     Pack years: 10 00     Types: Cigarettes     Quit date: 1999     Years since quittin 9   • Smokeless tobacco: Never Used   Vaping Use   • Vaping Use: Never used   Substance and Sexual Activity   • Alcohol use: Yes     Alcohol/week: 1 0 standard drink     Types: 1 Cans of beer per week   • Drug use: Never   • Sexual activity: Not on file       Current Outpatient Medications on File Prior to Visit   Medication Sig   • atorvastatin (LIPITOR) 20 mg tablet Take 1 tablet (20 mg total) by mouth daily   • magnesium 30 MG tablet Take 30 mg by mouth 2 (two) times a day   • famotidine (PEPCID) 40 MG tablet Take 1 tablet (40 mg total) by mouth daily at bedtime (Patient taking differently: Take 20 mg by mouth daily at bedtime)     No current facility-administered medications on file prior to visit  No Known Allergies    Physical Exam    /82 (BP Location: Left arm, Patient Position: Sitting, Cuff Size: Standard)   Pulse 84   Temp 98 7 °F (37 1 °C)   Ht 5' (1 524 m)   Wt 60 8 kg (134 lb)   LMP  (LMP Unknown)   BMI 26 17 kg/m²     Constitutional: normal, well developed, well nourished, alert, in no distress and non-toxic and no overt pain behavior  Eyes: anicteric  HEENT: grossly intact  Neck: supple, symmetric, trachea midline and no masses   Pulmonary:even and unlabored  Cardiovascular:No edema or pitting edema present  Skin:Normal without rashes or lesions and well hydrated  Psychiatric:Mood and affect appropriate  Neurologic:Cranial Nerves II-XII grossly intact  Musculoskeletal:normal,   Inspection:  Normal station and gait  Normal cervical curves and head posture  Skin intact without erythema  No sensory loss  There is no atrophy      Palpation:  There is tenderness to palpation overlying the bilateral cervical paraspinals, cervical facet joints  There is no tenderness over the upper trapezius muscles bilateral  No shoulder tenderness  Motor/Strength:  Equal strength in the bilateral upper extremities  Reflexes:  equal and symmetric in the upper limbs  Sensation:   Sensation intact to light touch and pinprick in the upper limbs  Maneuvers:  Negative Spurling's maneuver  Negative Lhermitte's sign  Imaging  MRI CERVICAL SPINE WITHOUT CONTRAST @  8-11-22     INDICATION: G44 [id number]: Cervicogenic headache      COMPARISON:  None      TECHNIQUE:  Sagittal T1, sagittal T2, sagittal inversion recovery, axial T2, axial  2D merge     IMAGE QUALITY:  Diagnostic     FINDINGS:     ALIGNMENT:  Reversal of the mid to lower cervical lordosis  No compression fracture  Grade 1 degenerative anterolisthesis is noted at the C3-4 and C4-5 levels  No scoliosis      MARROW SIGNAL:  Normal marrow signal is identified within the visualized bony structures  Small hemangiomas are noted within the inferior C7 ,T3, and T6 vertebral bodies      CERVICAL AND VISUALIZED THORACIC CORD:  Normal signal within the visualized cord      PREVERTEBRAL AND PARASPINAL SOFT TISSUES: A 1 1 x 1 4 x 1 6 cm T2 hyperintense right thyroid nodule is noted  Incidental discovery of one or more thyroid nodule(s) measuring more than 1 5 cm and without suspicious features is noted in this patient who is   above 28years old; according to guidelines published in the February 2015 white paper on incidental thyroid nodules in the Journal of the [employer] of Radiology Jay Lopez), further characterization with thyroid ultrasound is recommended      VISUALIZED POSTERIOR FOSSA:  Low lying cerebellar tonsils without meeting criteria for Chiari malformation      CERVICAL DISC SPACES:     C2-3: No focal disc herniation, central canal stenosis, or neural foraminal narrowing         C3-4: Grade 1 degenerative anterolisthesis with secondary unroofing of the posterior disc and diffuse disc bulge  Left facet hypertrophy  Mild central canal narrowing  Mild left neural foraminal stenosis      C4-5: Grade 1 degenerative anterolisthesis with secondary unroofing of the posterior disc and diffuse disc bulge  Left facet hypertrophy  Mild central canal narrowing  Moderate bilateral neural foraminal stenosis      C5-6: Diffuse disc osteophyte complex with bilateral uncovertebral hypertrophy partially effacing the ventral subarachnoid space  Mild central canal narrowing  Moderate to severe right and moderate left neural foraminal stenosis      C6-7: Diffuse disc osteophyte complex with bilateral uncovertebral hypertrophy partially effacing the ventral subarachnoid space  No central canal narrowing  Moderate right neural foraminal stenosis      C7-T1: No focal disc herniation, central canal stenosis, or neural foraminal narrowing  UPPER THORACIC DISC SPACES:  Normal      IMPRESSION:  1  Grade 1 degenerative anterolisthesis at the C3-4 and C4-5 levels with secondary unroofing of the posterior disc and diffuse disc bulge causing mild central canal narrowing  2  Diffuse disc osteophyte complexes are noted at the C5-6 and C6-7 levels with mild central canal narrowing at C5-6  Moderate to severe right C5-6 neural foraminal narrowing  3  The cervical cord appears normal in caliber and signal with no focal impingement  4   Incidental right thyroid nodule, further characterization with thyroid ultrasound is recommended  I have personally reviewed pertinent films in PACS and my interpretation is Multilevel cervical spondylosis

## 2022-11-10 ENCOUNTER — HOSPITAL ENCOUNTER (OUTPATIENT)
Dept: RADIOLOGY | Facility: CLINIC | Age: 72
Discharge: HOME | End: 2022-11-10

## 2022-11-10 VITALS
HEART RATE: 64 BPM | RESPIRATION RATE: 20 BRPM | OXYGEN SATURATION: 97 % | DIASTOLIC BLOOD PRESSURE: 78 MMHG | SYSTOLIC BLOOD PRESSURE: 133 MMHG | TEMPERATURE: 96.8 F

## 2022-11-10 DIAGNOSIS — M47.812 CERVICAL SPONDYLOSIS: ICD-10-CM

## 2022-11-10 RX ORDER — BUPIVACAINE HCL/PF 2.5 MG/ML
10 VIAL (ML) INJECTION ONCE
Status: COMPLETED | OUTPATIENT
Start: 2022-11-10 | End: 2022-11-10

## 2022-11-10 RX ADMIN — BUPIVACAINE HYDROCHLORIDE 6 ML: 2.5 INJECTION, SOLUTION EPIDURAL; INFILTRATION; INTRACAUDAL at 09:52

## 2022-11-10 NOTE — H&P
History of Present Illness: The patient is a 67 y o  female who presents with complaints of neck pain  Past Medical History:   Diagnosis Date   • Anxiety    • Arthritis    • Baker cyst, left    • Cholelithiasis    • Chronic headaches    • GERD (gastroesophageal reflux disease)    • Heart murmur    • Hyperlipidemia    • Menorrhagia    • Sinus infection        Past Surgical History:   Procedure Laterality Date   • CHOLECYSTECTOMY     • IR ASPIRATION BAKERS CYST Left    • LAPAROSCOPIC CHOLECYSTECTOMY           Current Outpatient Medications:   •  atorvastatin (LIPITOR) 20 mg tablet, Take 1 tablet (20 mg total) by mouth daily, Disp: 30 tablet, Rfl: 1  •  famotidine (PEPCID) 40 MG tablet, Take 1 tablet (40 mg total) by mouth daily at bedtime (Patient taking differently: Take 20 mg by mouth daily at bedtime), Disp: 30 tablet, Rfl: 5  •  magnesium 30 MG tablet, Take 30 mg by mouth 2 (two) times a day, Disp: , Rfl:     Current Facility-Administered Medications:   •  bupivacaine (PF) (MARCAINE) 0 25 % injection 10 mL, 10 mL, Perineural, Once, Filiberto Montenegro, DO    No Known Allergies    Physical Exam:   Vitals:    11/10/22 0931   BP: 123/75   Pulse: 66   Resp: 20   Temp: (!) 96 8 °F (36 °C)   SpO2: 98%     General: Awake, Alert, Oriented x 3, Mood and affect appropriate  Respiratory: Respirations even and unlabored  Cardiovascular: Peripheral pulses intact; no edema  Musculoskeletal Exam:  Pain with cervical extension    ASA Score: II    Patient/Chart Verification  Patient ID Verified: Verbal  ID Band Applied: No  Consents Confirmed: Procedural, To be obtained in the Pre-Procedure area  Interval H&P(within 24 hr) Complete (required for Outpatients and Surgery Admit only): To be obtained in the Pre-Procedure area  Allergies Reviewed: No  Anticoag/NSAID held?: NA  Currently on antibiotics?: No    Assessment:   1   Cervical spondylosis        Plan: cervical mbb with 0 25% bupiv bilateral C3,4,5

## 2022-11-10 NOTE — DISCHARGE INSTRUCTIONS

## 2022-11-11 ENCOUNTER — TELEPHONE (OUTPATIENT)
Dept: PAIN MEDICINE | Facility: CLINIC | Age: 72
End: 2022-11-11

## 2022-11-11 NOTE — TELEPHONE ENCOUNTER
S/w the patient and she stated that is correct  She did not receive any relief with the past MBB's  She continues with a HA and increasing neck pain  She denies taking anticoagulants and she would be interested in having a FRANDY  Can you please order?  Thanks

## 2022-11-11 NOTE — TELEPHONE ENCOUNTER
Please schedule cervical epidural steroid injection then please schedule follow-up with Leopold Bell, diagnosis cervical foraminal stenosis, intervertebral disc disorder

## 2022-11-11 NOTE — TELEPHONE ENCOUNTER
Caller: Pt     Doctor: Nina Loving    Reason for call: Please reach back out to patient      Call back#: [phone]

## 2022-11-11 NOTE — TELEPHONE ENCOUNTER
----- Message from Betty Siegel DO sent at 11/11/2022  2:20 PM EST -----  Patient's pain diary was completely negative  Please confirm  At this point time I would entertain a cervical epidural steroid injection to address any inflammatory component of her pain and then a follow-up with nurse practitioner  Please see if patient interested in I will provide the order

## 2022-11-16 NOTE — TELEPHONE ENCOUNTER
Patient did not want to schedule at this time wanted to think it over more and if she decides that this is something she would like to do in the future then she will call back to schedule

## 2022-11-17 ENCOUNTER — HOSPITAL ENCOUNTER (OUTPATIENT)
Dept: ULTRASOUND IMAGING | Facility: HOSPITAL | Age: 72
End: 2022-11-17
Attending: ANESTHESIOLOGY

## 2022-11-17 DIAGNOSIS — R93.7 ABNORMAL MRI, CERVICAL SPINE: ICD-10-CM

## 2022-11-22 ENCOUNTER — TELEPHONE (OUTPATIENT)
Dept: PAIN MEDICINE | Facility: CLINIC | Age: 72
End: 2022-11-22

## 2022-11-22 NOTE — TELEPHONE ENCOUNTER
----- Message from Pastor Carlos DO sent at 11/22/2022 12:31 PM EST -----  Bilateral thyroid nodules without suspicious features  No nodule meets current ACR criteria for requiring biopsy or followup ultrasounds     ----- Message -----  From: Interface, Radiology Results In  Sent: 11/22/2022  12:29 PM EST  To: Pastor Carlos DO

## 2022-11-22 NOTE — TELEPHONE ENCOUNTER
Caller: Roxanne Stone     Doctor:Lan    Reason for call: patient returning nurses phone call    Call back#: [phone]

## 2022-12-02 ENCOUNTER — RA CDI HCC (OUTPATIENT)
Dept: OTHER | Facility: HOSPITAL | Age: 72
End: 2022-12-02

## 2022-12-02 NOTE — PROGRESS NOTES
[address]  coding opportunities       Chart reviewed, no opportunity found: CHART REVIEWED, NO OPPORTUNITY FOUND        Patients Insurance     Medicare Insurance: Medicare

## 2022-12-09 ENCOUNTER — OFFICE VISIT (OUTPATIENT)
Dept: FAMILY MEDICINE CLINIC | Facility: CLINIC | Age: 72
End: 2022-12-09

## 2022-12-09 VITALS
DIASTOLIC BLOOD PRESSURE: 70 MMHG | BODY MASS INDEX: 26.62 KG/M2 | HEART RATE: 77 BPM | OXYGEN SATURATION: 99 % | HEIGHT: 60 IN | TEMPERATURE: 97.7 F | WEIGHT: 135.6 LBS | SYSTOLIC BLOOD PRESSURE: 132 MMHG

## 2022-12-09 DIAGNOSIS — Z00.00 MEDICARE ANNUAL WELLNESS VISIT, SUBSEQUENT: Primary | ICD-10-CM

## 2022-12-09 DIAGNOSIS — E78.00 HYPERCHOLESTEREMIA: ICD-10-CM

## 2022-12-09 DIAGNOSIS — Z23 NEED FOR INFLUENZA VACCINATION: ICD-10-CM

## 2022-12-09 DIAGNOSIS — K21.9 GASTROESOPHAGEAL REFLUX DISEASE WITHOUT ESOPHAGITIS: ICD-10-CM

## 2022-12-09 DIAGNOSIS — Z12.31 SCREENING MAMMOGRAM, ENCOUNTER FOR: ICD-10-CM

## 2022-12-09 DIAGNOSIS — E28.39 OVARIAN FAILURE DUE TO MENOPAUSE: ICD-10-CM

## 2022-12-09 RX ORDER — ATORVASTATIN CALCIUM 20 MG/1
20 TABLET, FILM COATED ORAL DAILY
Qty: 30 TABLET | Refills: 5 | Status: SHIPPED | OUTPATIENT
Start: 2022-12-09

## 2022-12-09 NOTE — PROGRESS NOTES
Assessment and Plan:   Continue same medications  Check labs after March 1  Continue work on diet, exercise as able and weight control  Follow-up with multiple specialist on neck and headache issues  Get mammogram and DEXA scan as ordered  Problem List Items Addressed This Visit    None    BMI Counseling: Body mass index is 26 48 kg/m²  The BMI is above normal  Nutrition recommendations include decreasing portion sizes and moderation in carbohydrate intake  Exercise recommendations include exercising 3-5 times per week  No pharmacotherapy was ordered  Rationale for BMI follow-up plan is due to patient being overweight or obese  Depression Screening and Follow-up Plan: Patient was screened for depression during today's encounter  They screened negative with a PHQ-2 score of 0  Preventive health issues were discussed with patient, and age appropriate screening tests were ordered as noted in patient's After Visit Summary  Personalized health advice and appropriate referrals for health education or preventive services given if needed, as noted in patient's After Visit Summary  History of Present Illness:     Patient presents for a Medicare Wellness Visit    Patient here for Medicare wellness and medical follow-up  No recent labs  No current medications  1) Hyperlipidemia borderline  No current medications  2)GERD-only uses as needed over-the-counter medications  No swallowing dysfunction  Patient Care Team:  Caden Álvarez MD as PCP - General (Family Medicine)     Review of Systems:     Review of Systems   Constitutional: Negative for activity change, appetite change, diaphoresis and fatigue  HENT: Negative for congestion, sinus pressure and sore throat  Respiratory: Negative for cough, chest tightness, shortness of breath and wheezing  Cardiovascular: Negative for chest pain, palpitations and leg swelling          Fast or slow heart rate   Gastrointestinal: Negative for abdominal pain, blood in stool, constipation, diarrhea, nausea and vomiting  Genitourinary: Negative for difficulty urinating, dysuria, frequency, hematuria and vaginal bleeding  Musculoskeletal: Negative for arthralgias, gait problem, joint swelling and myalgias  Neurological: Negative for dizziness, light-headedness and headaches  Psychiatric/Behavioral: Negative for agitation, confusion, dysphoric mood and sleep disturbance  The patient is not nervous/anxious           Problem List:     Patient Active Problem List   Diagnosis   • Hypercholesteremia   • Degenerative arthritis of thumb, right   • Abnormal bone density screening   • Arthritis   • Gastroesophageal reflux disease without esophagitis   • Chronic rhinitis   • Cervicogenic headache   • Cervical spondylosis      Past Medical and Surgical History:     Past Medical History:   Diagnosis Date   • Anxiety    • Arthritis    • Baker cyst, left    • Cholelithiasis    • Chronic headaches    • GERD (gastroesophageal reflux disease)    • Heart murmur    • Hyperlipidemia    • Menorrhagia    • Sinus infection      Past Surgical History:   Procedure Laterality Date   • CHOLECYSTECTOMY     • IR ASPIRATION BAKERS CYST Left    • LAPAROSCOPIC CHOLECYSTECTOMY        Family History:     Family History   Problem Relation Age of Onset   • Breast cancer Mother 46   • Lung cancer Mother    • Diabetes Mother    • Prostate cancer Brother    • No Known Problems Paternal Aunt    • Stroke Father    • No Known Problems Maternal Grandmother    • No Known Problems Maternal Grandfather    • No Known Problems Paternal Grandmother    • No Known Problems Paternal Grandfather    • No Known Problems Brother    • No Known Problems Brother    • No Known Problems Brother    • No Known Problems Brother    • Lymphoma Brother    • Substance Abuse Neg Hx    • Mental illness Neg Hx    • Colon cancer Neg Hx    • Colon polyps Neg Hx       Social History:     Social History     Socioeconomic History   • Marital status: /Civil Union     Spouse name: Estela Gonsalez   • Number of children: 2   • Years of education: None   • Highest education level: None   Occupational History   • None   Tobacco Use   • Smoking status: Former     Packs/day: 0 50     Years: 20 00     Pack years: 10 00     Types: Cigarettes     Quit date: 1999     Years since quittin 0   • Smokeless tobacco: Never   Vaping Use   • Vaping Use: Never used   Substance and Sexual Activity   • Alcohol use: Yes     Alcohol/week: 1 0 standard drink     Types: 1 Cans of beer per week   • Drug use: Never   • Sexual activity: None   Other Topics Concern   • None   Social History Narrative   • None     Social Determinants of Health     Financial Resource Strain: Low Risk    • Difficulty of Paying Living Expenses: Not hard at all   Food Insecurity: Not on file   Transportation Needs: No Transportation Needs   • Lack of Transportation (Medical): No   • Lack of Transportation (Non-Medical): No   Physical Activity: Not on file   Stress: Not on file   Social Connections: Not on file   Intimate Partner Violence: Not on file   Housing Stability: Not on file      Medications and Allergies:     Current Outpatient Medications   Medication Sig Dispense Refill   • atorvastatin (LIPITOR) 20 mg tablet Take 1 tablet (20 mg total) by mouth daily 30 tablet 1   • magnesium 30 MG tablet Take 30 mg by mouth 2 (two) times a day     • famotidine (PEPCID) 40 MG tablet Take 1 tablet (40 mg total) by mouth daily at bedtime (Patient taking differently: Take 20 mg by mouth daily at bedtime) 30 tablet 5     No current facility-administered medications for this visit       No Known Allergies   Immunizations:     Immunization History   Administered Date(s) Administered   • COVID-19 PFIZER VACCINE 0 3 ML IM 2021, 2021, 10/14/2021   • Influenza, high dose seasonal 0 7 mL 10/09/2020, 2021   • Tdap 2006      Health Maintenance:         Topic Date Due   • Breast Cancer Screening: Mammogram  11/23/2022   • Colorectal Cancer Screening  10/19/2023   • Hepatitis C Screening  Completed         Topic Date Due   • Hepatitis B Vaccine (1 of 3 - 3-dose series) Never done   • Pneumococcal Vaccine: 65+ Years (1 - PCV) Never done   • COVID-19 Vaccine (4 - Booster for Pfizer series) 02/14/2022   • Influenza Vaccine (1) 09/01/2022      Medicare Screening Tests and Risk Assessments:     Diego Mays is here for her Subsequent Wellness visit  Last Medicare Wellness visit information reviewed, patient interviewed and updates made to the record today  Health Risk Assessment:   Patient rates overall health as good  Patient feels that their physical health rating is same  Patient is satisfied with their life  Eyesight was rated as slightly worse  Hearing was rated as same  Patient feels that their emotional and mental health rating is same  Patients states they are never, rarely angry  Patient states they are sometimes unusually tired/fatigued  Pain experienced in the last 7 days has been some  Patient's pain rating has been 6/10  Patient states that she has experienced no weight loss or gain in last 6 months  Neck and headaches    Depression Screening:   PHQ-2 Score: 0  PHQ-9 Score: 2      Fall Risk Screening: In the past year, patient has experienced: no history of falling in past year      Urinary Incontinence Screening:   Patient has not leaked urine accidently in the last six months  Home Safety:  Patient does not have trouble with stairs inside or outside of their home  Patient has working smoke alarms and has no working carbon monoxide detector  Home safety hazards include: none  Nutrition:   Current diet is Regular  Medications:   Patient is currently taking over-the-counter supplements  OTC medications include: Magnesium  Patient is able to manage medications       Activities of Daily Living (ADLs)/Instrumental Activities of Daily Living (IADLs):   Walk and transfer into and out of bed and chair?: Yes  Dress and groom yourself?: Yes    Bathe or shower yourself?: Yes    Feed yourself? Yes  Do your laundry/housekeeping?: Yes  Manage your money, pay your bills and track your expenses?: Yes  Make your own meals?: Yes    Do your own shopping?: Yes    Durable Medical Equipment Suppliers  None    Previous Hospitalizations:   Any hospitalizations or ED visits within the last 12 months?: No      Advance Care Planning:   Living will: No    Durable POA for healthcare: No    Advanced directive: No    Five wishes given: Yes      Cognitive Screening:   Provider or family/friend/caregiver concerned regarding cognition?: No    PREVENTIVE SCREENINGS      Cardiovascular Screening:    General: Screening Not Indicated and History Lipid Disorder      Diabetes Screening:     General: Screening Current      Colorectal Cancer Screening:     General: Screening Current      Breast Cancer Screening:     General: Screening Current    Due for: Mammogram        Cervical Cancer Screening:    General: Screening Not Indicated      Osteoporosis Screening:      Due for: DXA Appendicular      Abdominal Aortic Aneurysm (AAA) Screening:        General: Screening Not Indicated      Lung Cancer Screening:     General: Screening Not Indicated      Hepatitis C Screening:    General: Screening Current    Screening, Brief Intervention, and Referral to Treatment (SBIRT)    Screening  Typical number of drinks in a day: 0  Typical number of drinks in a week: 1  Interpretation: Low risk drinking behavior  AUDIT-C Screenin) How often did you have a drink containing alcohol in the past year? 2 to 4 times a month  2) How many drinks did you have on a typical day when you were drinking in the past year?  1 to 2  3) How often did you have 6 or more drinks on one occasion in the past year? never    AUDIT-C Score: 2  Interpretation: Score 0-2 (female): Negative screen for alcohol misuse    Single Item Drug Screening:  How often have you used an illegal drug (including marijuana) or a prescription medication for non-medical reasons in the past year? never    Single Item Drug Screen Score: 0  Interpretation: Negative screen for possible drug use disorder    No results found  Physical Exam:     /70 (BP Location: Left arm, Patient Position: Sitting, Cuff Size: Standard)   Pulse 77   Temp 97 7 °F (36 5 °C) (Tympanic)   Ht 5' (1 524 m)   Wt 61 5 kg (135 lb 9 6 oz)   LMP  (LMP Unknown)   SpO2 99%   BMI 26 48 kg/m²     Physical Exam  Vitals and nursing note reviewed  Constitutional:       General: She is not in acute distress  Appearance: She is not ill-appearing  HENT:      Head: Normocephalic and atraumatic  Right Ear: Tympanic membrane, ear canal and external ear normal       Left Ear: Tympanic membrane, ear canal and external ear normal       Nose: Nose normal       Mouth/Throat:      Pharynx: No posterior oropharyngeal erythema  Eyes:      General:         Right eye: No discharge  Left eye: No discharge  Extraocular Movements: Extraocular movements intact  Conjunctiva/sclera: Conjunctivae normal       Pupils: Pupils are equal, round, and reactive to light  Neck:      Thyroid: No thyromegaly  Vascular: No carotid bruit  Trachea: No tracheal deviation  Cardiovascular:      Rate and Rhythm: Normal rate and regular rhythm  Heart sounds: Normal heart sounds  No murmur heard  Pulmonary:      Effort: Pulmonary effort is normal  No respiratory distress  Breath sounds: Normal breath sounds  No wheezing  Abdominal:      General: Bowel sounds are normal  There is no distension  Palpations: Abdomen is soft  There is no mass  Tenderness: There is no abdominal tenderness  There is no guarding  Musculoskeletal:      Cervical back: Normal range of motion and neck supple  Right lower leg: No edema  Left lower leg: No edema     Lymphadenopathy:      Cervical: No cervical adenopathy  Skin:     Findings: No rash  Neurological:      General: No focal deficit present  Mental Status: She is alert and oriented to person, place, and time  Cranial Nerves: No cranial nerve deficit        Deep Tendon Reflexes: Reflexes normal    Psychiatric:         Mood and Affect: Mood normal          Behavior: Behavior normal           Simon Wyman MD

## 2022-12-09 NOTE — PATIENT INSTRUCTIONS
Continue same medications  Check labs after March 1  Continue work on diet, exercise as able and weight control  Follow-up with multiple specialist on neck and headache issues  Get mammogram and DEXA scan as ordered  Medicare Preventive Visit Patient Instructions  Thank you for completing your Welcome to Medicare Visit or Medicare Annual Wellness Visit today  Your next wellness visit will be due in one year (12/10/2023)  The screening/preventive services that you may require over the next 5-10 years are detailed below  Some tests may not apply to you based off risk factors and/or age  Screening tests ordered at today's visit but not completed yet may show as past due  Also, please note that scanned in results may not display below  Preventive Screenings:  Service Recommendations Previous Testing/Comments   Colorectal Cancer Screening  * Colonoscopy    * Fecal Occult Blood Test (FOBT)/Fecal Immunochemical Test (FIT)  * Fecal DNA/Cologuard Test  * Flexible Sigmoidoscopy Age: 39-70 years old   Colonoscopy: every 10 years (may be performed more frequently if at higher risk)  OR  FOBT/FIT: every 1 year  OR  Cologuard: every 3 years  OR  Sigmoidoscopy: every 5 years  Screening may be recommended earlier than age 39 if at higher risk for colorectal cancer  Also, an individualized decision between you and your healthcare provider will decide whether screening between the ages of 74-80 would be appropriate  Colonoscopy: 10/19/2020  FOBT/FIT: Not on file  Cologuard: 10/19/2020  Sigmoidoscopy: Not on file          Breast Cancer Screening Age: 36 years old  Frequency: every 1-2 years  Not required if history of left and right mastectomy Mammogram: 11/23/2021        Cervical Cancer Screening Between the ages of 21-29, pap smear recommended once every 3 years  Between the ages of 33-67, can perform pap smear with HPV co-testing every 5 years     Recommendations may differ for women with a history of total hysterectomy, cervical cancer, or abnormal pap smears in past  Pap Smear: Not on file        Hepatitis C Screening Once for adults born between 1945 and 1965  More frequently in patients at high risk for Hepatitis C Hep C Antibody: 12/14/2018        Diabetes Screening 1-2 times per year if you're at risk for diabetes or have pre-diabetes Fasting glucose: No results in last 5 years (No results in last 5 years)  A1C: No results in last 5 years (No results in last 5 years)      Cholesterol Screening Once every 5 years if you don't have a lipid disorder  May order more often based on risk factors  Lipid panel: 02/12/2022          Other Preventive Screenings Covered by Medicare:  Abdominal Aortic Aneurysm (AAA) Screening: covered once if your at risk  You're considered to be at risk if you have a family history of AAA  Lung Cancer Screening: covers low dose CT scan once per year if you meet all of the following conditions: (1) Age 50-69; (2) No signs or symptoms of lung cancer; (3) Current smoker or have quit smoking within the last 15 years; (4) You have a tobacco smoking history of at least 20 pack years (packs per day multiplied by number of years you smoked); (5) You get a written order from a healthcare provider  Glaucoma Screening: covered annually if you're considered high risk: (1) You have diabetes OR (2) Family history of glaucoma OR (3)  aged 48 and older OR (3)  American aged 72 and older  Osteoporosis Screening: covered every 2 years if you meet one of the following conditions: (1) You're estrogen deficient and at risk for osteoporosis based off medical history and other findings; (2) Have a vertebral abnormality; (3) On glucocorticoid therapy for more than 3 months; (4) Have primary hyperparathyroidism; (5) On osteoporosis medications and need to assess response to drug therapy  Last bone density test (DXA Scan): 11/13/2017  HIV Screening: covered annually if you're between the age of 12-76  Also covered annually if you are younger than 13 and older than 72 with risk factors for HIV infection  For pregnant patients, it is covered up to 3 times per pregnancy  Immunizations:  Immunization Recommendations   Influenza Vaccine Annual influenza vaccination during flu season is recommended for all persons aged >= 6 months who do not have contraindications   Pneumococcal Vaccine   * Pneumococcal conjugate vaccine = PCV13 (Prevnar 13), PCV15 (Vaxneuvance), PCV20 (Prevnar 20)  * Pneumococcal polysaccharide vaccine = PPSV23 (Pneumovax) Adults 25-60 years old: 1-3 doses may be recommended based on certain risk factors  Adults 72 years old: 1-2 doses may be recommended based off what pneumonia vaccine you previously received   Hepatitis B Vaccine 3 dose series if at intermediate or high risk (ex: diabetes, end stage renal disease, liver disease)   Tetanus (Td) Vaccine - COST NOT COVERED BY MEDICARE PART B Following completion of primary series, a booster dose should be given every 10 years to maintain immunity against tetanus  Td may also be given as tetanus wound prophylaxis  Tdap Vaccine - COST NOT COVERED BY MEDICARE PART B Recommended at least once for all adults  For pregnant patients, recommended with each pregnancy  Shingles Vaccine (Shingrix) - COST NOT COVERED BY MEDICARE PART B  2 shot series recommended in those aged 48 and above     Health Maintenance Due:      Topic Date Due    Breast Cancer Screening: Mammogram  11/23/2022    Colorectal Cancer Screening  10/19/2023    Hepatitis C Screening  Completed     Immunizations Due:      Topic Date Due    Hepatitis B Vaccine (1 of 3 - 3-dose series) Never done    Pneumococcal Vaccine: 65+ Years (1 - PCV) Never done    COVID-19 Vaccine (4 - Booster for Pfizer series) 02/14/2022    Influenza Vaccine (1) 09/01/2022     Advance Directives   What are advance directives? Advance directives are legal documents that state your wishes and plans for medical care  These plans are made ahead of time in case you lose your ability to make decisions for yourself  Advance directives can apply to any medical decision, such as the treatments you want, and if you want to donate organs  What are the types of advance directives? There are many types of advance directives, and each state has rules about how to use them  You may choose a combination of any of the following:  Living will: This is a written record of the treatment you want  You can also choose which treatments you do not want, which to limit, and which to stop at a certain time  This includes surgery, medicine, IV fluid, and tube feedings  Durable power of  for [facility]): This is a written record that states who you want to make healthcare choices for you when you are unable to make them for yourself  This person, called a proxy, is usually a family member or a friend  You may choose more than 1 proxy  Do not resuscitate (DNR) order:  A DNR order is used in case your heart stops beating or you stop breathing  It is a request not to have certain forms of treatment, such as CPR  A DNR order may be included in other types of advance directives  Medical directive: This covers the care that you want if you are in a coma, near death, or unable to make decisions for yourself  You can list the treatments you want for each condition  Treatment may include pain medicine, surgery, blood transfusions, dialysis, IV or tube feedings, and a ventilator (breathing machine)  Values history: This document has questions about your views, beliefs, and how you feel and think about life  This information can help others choose the care that you would choose  Why are advance directives important? An advance directive helps you control your care  Although spoken wishes may be used, it is better to have your wishes written down  Spoken wishes can be misunderstood, or not followed   Treatments may be given even if you do not want them  An advance directive may make it easier for your family to make difficult choices about your care  Weight Management   Why it is important to manage your weight:  Being overweight increases your risk of health conditions such as heart disease, high blood pressure, type 2 diabetes, and certain types of cancer  It can also increase your risk for osteoarthritis, sleep apnea, and other respiratory problems  Aim for a slow, steady weight loss  Even a small amount of weight loss can lower your risk of health problems  How to lose weight safely:  A safe and healthy way to lose weight is to eat fewer calories and get regular exercise  You can lose up about 1 pound a week by decreasing the number of calories you eat by 500 calories each day  Healthy meal plan for weight management:  A healthy meal plan includes a variety of foods, contains fewer calories, and helps you stay healthy  A healthy meal plan includes the following:  Eat whole-grain foods more often  A healthy meal plan should contain fiber  Fiber is the part of grains, fruits, and vegetables that is not broken down by your body  Whole-grain foods are healthy and provide extra fiber in your diet  Some examples of whole-grain foods are whole-wheat breads and pastas, oatmeal, brown rice, and bulgur  Eat a variety of vegetables every day  Include dark, leafy greens such as spinach, kale, zee greens, and mustard greens  Eat yellow and orange vegetables such as carrots, sweet potatoes, and winter squash  Eat a variety of fruits every day  Choose fresh or canned fruit (canned in its own juice or light syrup) instead of juice  Fruit juice has very little or no fiber  Eat low-fat dairy foods  Drink fat-free (skim) milk or 1% milk  Eat fat-free yogurt and low-fat cottage cheese  Try low-fat cheeses such as mozzarella and other reduced-fat cheeses  Choose meat and other protein foods that are low in fat    Choose beans or other legumes such as split peas or lentils  Choose fish, skinless poultry (chicken or turkey), or lean cuts of red meat (beef or pork)  Before you cook meat or poultry, cut off any visible fat  Use less fat and oil  Try baking foods instead of frying them  Add less fat, such as margarine, sour cream, regular salad dressing and mayonnaise to foods  Eat fewer high-fat foods  Some examples of high-fat foods include french fries, doughnuts, ice cream, and cakes  Eat fewer sweets  Limit foods and drinks that are high in sugar  This includes candy, cookies, regular soda, and sweetened drinks  Exercise:  Exercise at least 30 minutes per day on most days of the week  Some examples of exercise include walking, biking, dancing, and swimming  You can also fit in more physical activity by taking the stairs instead of the elevator or parking farther away from stores  Ask your healthcare provider about the best exercise plan for you  © Copyright [employer] 2018 Information is for End User's use only and may not be sold, redistributed or otherwise used for commercial purposes   All illustrations and images included in CareNotes® are the copyrighted property of [employer] M [employer]  or [address] [place] [address]

## 2023-02-09 ENCOUNTER — HOSPITAL ENCOUNTER (OUTPATIENT)
Dept: BONE DENSITY | Facility: CLINIC | Age: 73
Discharge: HOME | End: 2023-02-09

## 2023-02-09 ENCOUNTER — HOSPITAL ENCOUNTER (OUTPATIENT)
Dept: MAMMOGRAPHY | Facility: CLINIC | Age: 73
Discharge: HOME | End: 2023-02-09

## 2023-02-09 VITALS — BODY MASS INDEX: 26.5 KG/M2 | WEIGHT: 135 LBS | HEIGHT: 60 IN

## 2023-02-09 DIAGNOSIS — E28.39 OVARIAN FAILURE DUE TO MENOPAUSE: ICD-10-CM

## 2023-02-09 DIAGNOSIS — Z12.31 SCREENING MAMMOGRAM, ENCOUNTER FOR: ICD-10-CM

## 2023-02-09 NOTE — RESULT ENCOUNTER NOTE
Call patient with lab result-osteopenia  Should be taking calcium 1200 mg and vitamin D 1 to 2000 units daily

## 2023-02-10 ENCOUNTER — TELEPHONE (OUTPATIENT)
Dept: FAMILY MEDICINE CLINIC | Facility: CLINIC | Age: 73
End: 2023-02-10

## 2023-02-10 NOTE — TELEPHONE ENCOUNTER
Patient aware of results    ----- Message from Marcell Cox MD sent at 2/9/2023  1:57 PM EST -----  Call patient with lab result-osteopenia  Should be taking calcium 1200 mg and vitamin D 1 to 2000 units daily

## 2023-03-09 LAB
ALBUMIN SERPL-MCNC: 4.1 G/DL (ref 3.6–5.1)
ALBUMIN/GLOB SERPL: 1.4 (CALC) (ref 1–2.5)
ALP SERPL-CCNC: 55 U/L (ref 37–153)
ALT SERPL-CCNC: 18 U/L (ref 6–29)
AST SERPL-CCNC: 24 U/L (ref 10–35)
BILIRUB SERPL-MCNC: 1.5 MG/DL (ref 0.2–1.2)
BUN SERPL-MCNC: 13 MG/DL (ref 7–25)
BUN/CREAT SERPL: ABNORMAL (CALC) (ref 6–22)
CALCIUM SERPL-MCNC: 9.6 MG/DL (ref 8.6–10.4)
CHLORIDE SERPL-SCNC: 104 MMOL/L (ref 98–110)
CHOLEST SERPL-MCNC: 180 MG/DL
CHOLEST/HDLC SERPL: 2.6 (CALC)
CO2 SERPL-SCNC: 26 MMOL/L (ref 20–32)
CREAT SERPL-MCNC: 0.7 MG/DL (ref 0.6–1)
GFR/BSA.PRED SERPLBLD CYS-BASED-ARV: 92 ML/MIN/1.73M2
GLOBULIN SER CALC-MCNC: 3 G/DL (CALC) (ref 1.9–3.7)
GLUCOSE SERPL-MCNC: 99 MG/DL (ref 65–99)
HDLC SERPL-MCNC: 68 MG/DL
LDLC SERPL CALC-MCNC: 93 MG/DL (CALC)
NONHDLC SERPL-MCNC: 112 MG/DL (CALC)
POTASSIUM SERPL-SCNC: 4.3 MMOL/L (ref 3.5–5.3)
PROT SERPL-MCNC: 7.1 G/DL (ref 6.1–8.1)
SODIUM SERPL-SCNC: 140 MMOL/L (ref 135–146)
TRIGL SERPL-MCNC: 96 MG/DL

## 2023-03-13 ENCOUNTER — TELEPHONE (OUTPATIENT)
Dept: FAMILY MEDICINE CLINIC | Facility: CLINIC | Age: 73
End: 2023-03-13

## 2023-03-13 NOTE — TELEPHONE ENCOUNTER
Patient aware of results      ----- Message from Marlo Carlos MD sent at 3/11/2023  1:24 PM EST -----  Call patient with lab result-labs look good

## 2023-03-19 ENCOUNTER — OFFICE VISIT (OUTPATIENT)
Dept: URGENT CARE | Facility: CLINIC | Age: 73
End: 2023-03-19

## 2023-03-19 ENCOUNTER — APPOINTMENT (OUTPATIENT)
Dept: RADIOLOGY | Facility: CLINIC | Age: 73
End: 2023-03-19

## 2023-03-19 VITALS
SYSTOLIC BLOOD PRESSURE: 139 MMHG | HEART RATE: 72 BPM | DIASTOLIC BLOOD PRESSURE: 62 MMHG | TEMPERATURE: 99.7 F | HEIGHT: 60 IN | BODY MASS INDEX: 25.52 KG/M2 | OXYGEN SATURATION: 98 % | WEIGHT: 130 LBS

## 2023-03-19 DIAGNOSIS — S69.92XA INJURY OF LEFT WRIST, INITIAL ENCOUNTER: ICD-10-CM

## 2023-03-19 DIAGNOSIS — S69.92XA INJURY OF LEFT WRIST, INITIAL ENCOUNTER: Primary | ICD-10-CM

## 2023-03-19 NOTE — PROGRESS NOTES
[facility]    NAME: Silver García is a 67 y o  female  : 1950    MRN: [id number]  DATE: 2023  TIME: 1:47 PM    Assessment and Plan   Injury of left wrist, initial encounter [S69 [id number]]  1  Injury of left wrist, initial encounter  XR wrist 3+ vw left    XR hand 3+ vw left        Reviewed hand and wrist x-ray which showed any acute fractures or dislocation  Results were discussed with radiology over the phone  Official read is pending  Patient does have a wrist splint, to continue with the wrist splint at home  Ibuprofen as needed with food  Provided patient with strict precautionary measures    Patient Instructions   There are no Patient Instructions on file for this visit  Follow up with PCP in 3-5 days  Proceed to ER if symptoms worsen  Chief Complaint     Chief Complaint   Patient presents with   • Wrist Injury     Patient presents with left hand/wrist pain related to falling on Thursday evening while playing pickle ball  Left wrist edematous without ecchymosis  Full ROM to all fingers and positive PMS all finger  Leaving for [place] on Tuesday and family members encouraged her to get it checked out prior to leaving     History of Present Illness   HPI     70-year-old female who presented for evaluation of left hand and wrist pain  Patient noted that she fell about 2 to 3 days prior to encounter while playing pickle ball  She describes falling on an outstretched left hand  She noted ecchymosis and swelling of the left wrist   She continues to have full range of motion of the left hand  She has tried ibuprofen once which helped  No other injuries or previous surgical intervention for the hand  Review of Systems   Review of Systems   All other systems reviewed and are negative      The following portions of the patient's history were reviewed and updated as appropriate: allergies, current medications, past family history, past medical history, past social history, past surgical history and problem list      Medications have been verified  Objective   /62   Pulse 72   Temp 99 7 °F (37 6 °C) (Tympanic)   Ht 5' (1 524 m)   Wt 59 kg (130 lb)   LMP  (LMP Unknown)   SpO2 98%   BMI 25 39 kg/m²     Physical Exam  Constitutional:       General: She is not in acute distress  Appearance: Normal appearance  She is not ill-appearing, toxic-appearing or diaphoretic  Musculoskeletal:      Comments: Left Elbow:  Mild swelling, erythema, mild increased warmth  rom limited due to pain  Tenderness on the scaphoid bone   no laxity of joint    Left Wrist  no swelling, erythema, or increased warmth  rom full  nontender  no laxity of joint    Left Hand  no erythema  Swelling and tenderness: as above only  rom fingers mcp, pip, dip intact without pain  strenght flexion and extension mcp, pip, dip 5/5  sensation intact  capillary refill intact     Noted chronic bilateral hand deformities    Neurological:      Mental Status: She is alert         Roseanna Chapman MD

## 2023-03-31 ENCOUNTER — TELEPHONE (OUTPATIENT)
Dept: NEUROLOGY | Facility: CLINIC | Age: 73
End: 2023-03-31

## 2023-03-31 NOTE — TELEPHONE ENCOUNTER
Called and spoke to patient - confirmed upcoming appointment with Demarcus Carranza on 03/31/23 8:45 am at the [facility] office  Provided patient with apt date, time and location  Informed patient that check in is at least 15 minutes prior to apt time  The patient is not  having any issues or concerns at this time

## 2023-04-13 ENCOUNTER — OFFICE VISIT (OUTPATIENT)
Dept: NEUROLOGY | Facility: CLINIC | Age: 73
End: 2023-04-13
Payer: MEDICARE

## 2023-04-13 VITALS
TEMPERATURE: 95.2 F | HEART RATE: 70 BPM | HEIGHT: 60 IN | DIASTOLIC BLOOD PRESSURE: 58 MMHG | WEIGHT: 126 LBS | BODY MASS INDEX: 24.74 KG/M2 | SYSTOLIC BLOOD PRESSURE: 122 MMHG | RESPIRATION RATE: 16 BRPM

## 2023-04-13 DIAGNOSIS — R09.81 SINUS CONGESTION: ICD-10-CM

## 2023-04-13 DIAGNOSIS — G44.86 CERVICOGENIC HEADACHE: Primary | ICD-10-CM

## 2023-04-13 PROCEDURE — 99213 OFFICE O/P EST LOW 20 MIN: CPT | Performed by: NURSE PRACTITIONER

## 2023-04-13 RX ORDER — DULOXETIN HYDROCHLORIDE 20 MG/1
20 CAPSULE, DELAYED RELEASE ORAL DAILY
Qty: 30 CAPSULE | Refills: 3 | Status: SHIPPED | OUTPATIENT
Start: 2023-04-13

## 2023-04-13 NOTE — PATIENT INSTRUCTIONS
TMJ exercises like we discussed (print out given)  Cymbalta low dose; let me know how this works  Consider use of over the counter claritin daily for allergies  Follow up in 6 months

## 2023-04-13 NOTE — PROGRESS NOTES
Patient ID: Joaquin Santana is a 67 y o  female  Assessment/Plan:  Patient instructions:  TMJ exercises like we discussed (print out given)  Cymbalta low dose; let me know how this works  Consider use of over the counter claritin daily for allergies  Follow up in 6 months     Diagnoses and all orders for this visit:    Cervicogenic headache  -     DULoxetine (CYMBALTA) 20 mg capsule; Take 1 capsule (20 mg total) by mouth daily    Sinus congestion        Subjective:    LISANDRO  Dayanara Hu presents for headache follow up  She states continued pain similar to previous description, but she does have more sinus congestion and TMJ tenderness lately  Last office visit she was referred to pain management; unfortunately MBB injection was not helpful; she was offered EMANI but did not pursue this  She did have a DXA scan that showed osteopenia and she is supplementing calcium/vit d  She did have follow up US thyroid and it did not show suspicious nodule features  Recent labs reviewed-LDL 93, creat 0 70, na 140, k 4 3, ast 24, alt 18  Previous History:  Selina Tamayo is a 70year old right handed female with past medical history of arthritis, bakers cyst, gerd, hyperlipidemia, chronic sleep problems who presents today for new patient evaluation of headache/facial pain  Referred by ENT physician; also has seen PCP and chiropractor for evaluation  She states headaches were intermittent/not too bothersome since age 39  In the past 2-3 months she has had increased pain in her neck and has been having daily headaches- no injury or inciting event  Headaches start in occipital regions and radiate to bitemporal regions R>L  She does have some tenderness to temple  She does report pins and needles feeling in mouth and pressure in face that is constant/unable to be triggered  She denies TMJ tenderness  Interestingly she states chewing gum really helps her pain in her mouth  She has point tenderness to the back of the head   Headaches are accompanied by lightheadedness at times but no nausea, vomiting, photophobia, vision change (history of lasik surgery), fever, swallowing trouble/speech trouble, or persistent paresthesias/pain in her upper extremities  She states occasionally she will have tingling in her hands when she wakes up but this never lasts  She has used over the counter NSAID's with only some relief; heat helps some; never tried ice/other topicals    She has been going to the chiropractor 2x/week for past 6 weeks without much relief- states they did xrays of the neck there which show abnormal curvature (I do not have this to review)  She lives with her ; 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); she enjoys golfing/staying active  She states doesn't drink as much water as she should; she does use small amount of caffeine and drinks maybe 1 glass of wine/week  She states her sleep is same as it has always been- gets about 2-3 hours at a time, maybe 6-7 hours total/night; doesn't necessarily wake because of pain          The following portions of the patient's history were reviewed and updated as appropriate: allergies, current medications, past family history, past medical history, past social history, past surgical history and problem list     The following portions of the patient's history were reviewed and updated as appropriate: allergies, current medications, past family history, past medical history, past social history, past surgical history and problem list          Objective:    Blood pressure 122/58, pulse 70, temperature (!) 95 2 °F (35 1 °C), temperature source Temporal, resp  rate 16, height 5' (1 524 m), weight 57 2 kg (126 lb), not currently breastfeeding  Physical Exam  Vitals reviewed  Constitutional:       Appearance: She is normal weight  HENT:      Head: Normocephalic and atraumatic        Comments: Bilateral TMJ tenderness and crepitus today     Right Ear: Tympanic membrane and external ear normal       Left Ear: Tympanic membrane and external ear normal       Ears:      Comments: Left ear with moderate amount of cerumen; bilateral slight retraction of TM's but otherwise normal TM     Nose: Nose normal       Mouth/Throat:      Pharynx: Oropharynx is clear  Eyes:      General: Lids are normal       Extraocular Movements: Extraocular movements intact  Pupils: Pupils are equal, round, and reactive to light  Cardiovascular:      Rate and Rhythm: Normal rate and regular rhythm  Pulses: Normal pulses  Heart sounds: Normal heart sounds  Pulmonary:      Effort: Pulmonary effort is normal       Breath sounds: Normal breath sounds  Abdominal:      General: There is no distension  Musculoskeletal:      Cervical back: Normal range of motion  Tenderness present  Right lower leg: No edema  Left lower leg: No edema  Skin:     General: Skin is warm and dry  Capillary Refill: Capillary refill takes less than 2 seconds  Neurological:      General: No focal deficit present  Mental Status: She is alert  Mental status is at baseline  Motor: Motor strength is normal       Deep Tendon Reflexes:      Reflex Scores:       Brachioradialis reflexes are 2+ on the right side and 2+ on the left side  Patellar reflexes are 2+ on the right side and 2+ on the left side  Psychiatric:         Mood and Affect: Mood normal          Speech: Speech normal          Neurological Exam  Mental Status  Alert  Oriented to person, place and time  Speech is normal  Language is fluent with no aphasia  Cranial Nerves  CN II: Visual acuity is normal  Visual fields full to confrontation  CN III, IV, VI: Extraocular movements intact bilaterally  Normal lids and orbits bilaterally  Pupils equal round and reactive to light bilaterally  CN V: Facial sensation is normal   CN VII: Full and symmetric facial movement    CN VIII: Hearing is normal   CN IX, X: Palate elevates symmetrically  Normal gag reflex  CN XI: Shoulder shrug strength is normal   CN XII: Tongue midline without atrophy or fasciculations  Motor  Normal muscle bulk throughout  Strength is 5/5 throughout all four extremities  Sensory  Light touch is normal in upper and lower extremities  Reflexes                                            Right                      Left  Brachioradialis                    2+                         2+  Patellar                                2+                         2+    Coordination  Right: Rapid alternating movement normal Left: Rapid alternating movement normal     Gait  Casual gait is normal including stance, stride, and arm swing  Able to rise from chair without using arms  ROS:    Review of Systems   Constitutional: Negative  Negative for appetite change and fever  HENT: Negative  Negative for hearing loss, tinnitus, trouble swallowing and voice change  Eyes: Negative  Negative for photophobia, pain and visual disturbance  Respiratory: Negative  Negative for shortness of breath  Cardiovascular: Negative  Negative for palpitations  Gastrointestinal: Negative  Negative for nausea and vomiting  Endocrine: Negative  Negative for cold intolerance  Genitourinary: Negative  Negative for dysuria, frequency and urgency  Musculoskeletal: Negative  Negative for gait problem, myalgias and neck pain  Skin: Negative  Negative for rash  Allergic/Immunologic: Negative  Neurological: Positive for headaches (Constant)  Negative for dizziness, tremors, seizures, syncope, facial asymmetry, speech difficulty, weakness, light-headedness and numbness  Hematological: Negative  Does not bruise/bleed easily  Psychiatric/Behavioral: Negative  Negative for confusion, hallucinations and sleep disturbance     ROS was reviewed and updated as appropriate

## 2023-05-21 DIAGNOSIS — E78.00 HYPERCHOLESTEREMIA: ICD-10-CM

## 2023-05-21 RX ORDER — ATORVASTATIN CALCIUM 20 MG/1
TABLET, FILM COATED ORAL
Qty: 30 TABLET | Refills: 5 | Status: SHIPPED | OUTPATIENT
Start: 2023-05-21

## 2023-06-16 DIAGNOSIS — E78.00 HYPERCHOLESTEREMIA: ICD-10-CM

## 2023-06-16 RX ORDER — ATORVASTATIN CALCIUM 20 MG/1
20 TABLET, FILM COATED ORAL DAILY
Qty: 30 TABLET | Refills: 0 | Status: SHIPPED | OUTPATIENT
Start: 2023-06-16

## 2023-08-10 ENCOUNTER — TELEPHONE (OUTPATIENT)
Dept: FAMILY MEDICINE CLINIC | Facility: CLINIC | Age: 73
End: 2023-08-10

## 2023-08-10 NOTE — TELEPHONE ENCOUNTER
Patient called in wanting Lucy Singh to review her CT of the abdomen that was done at [facility] and let her know the next step. Patient states that a kidney stone was found and she was told to follow up with her PCP.

## 2023-08-11 NOTE — TELEPHONE ENCOUNTER
Patient is not having pain and they did provide her a strainer to catch it. She will follow up in a few days with us, she will like to give it a few days for it to pass. She will also call back if she develops pain or symptoms.

## 2023-08-25 ENCOUNTER — OFFICE VISIT (OUTPATIENT)
Dept: FAMILY MEDICINE CLINIC | Facility: CLINIC | Age: 73
End: 2023-08-25
Payer: MEDICARE

## 2023-08-25 VITALS
HEIGHT: 60 IN | HEART RATE: 77 BPM | BODY MASS INDEX: 24.74 KG/M2 | RESPIRATION RATE: 18 BRPM | SYSTOLIC BLOOD PRESSURE: 130 MMHG | OXYGEN SATURATION: 98 % | TEMPERATURE: 97.4 F | WEIGHT: 126 LBS | DIASTOLIC BLOOD PRESSURE: 78 MMHG

## 2023-08-25 DIAGNOSIS — N30.00 ACUTE CYSTITIS WITHOUT HEMATURIA: ICD-10-CM

## 2023-08-25 DIAGNOSIS — N20.0 KIDNEY STONE ON LEFT SIDE: Primary | ICD-10-CM

## 2023-08-25 DIAGNOSIS — L90.0 LICHEN SCLEROSUS ET ATROPHICUS: ICD-10-CM

## 2023-08-25 LAB
SL AMB  POCT GLUCOSE, UA: NEGATIVE
SL AMB LEUKOCYTE ESTERASE,UA: NORMAL
SL AMB POCT BILIRUBIN,UA: NEGATIVE
SL AMB POCT BLOOD,UA: NEGATIVE
SL AMB POCT CLARITY,UA: YELLOW
SL AMB POCT COLOR,UA: CLEAR
SL AMB POCT KETONES,UA: NEGATIVE
SL AMB POCT NITRITE,UA: NEGATIVE
SL AMB POCT PH,UA: 6
SL AMB POCT SPECIFIC GRAVITY,UA: 1.02
SL AMB POCT URINE PROTEIN: NEGATIVE
SL AMB POCT UROBILINOGEN: NORMAL

## 2023-08-25 PROCEDURE — 81002 URINALYSIS NONAUTO W/O SCOPE: CPT | Performed by: FAMILY MEDICINE

## 2023-08-25 PROCEDURE — 99214 OFFICE O/P EST MOD 30 MIN: CPT | Performed by: FAMILY MEDICINE

## 2023-08-25 RX ORDER — TAMSULOSIN HYDROCHLORIDE 0.4 MG/1
0.4 CAPSULE ORAL
Qty: 10 CAPSULE | Refills: 0 | Status: SHIPPED | OUTPATIENT
Start: 2023-08-25

## 2023-08-25 RX ORDER — SULFAMETHOXAZOLE AND TRIMETHOPRIM 800; 160 MG/1; MG/1
1 TABLET ORAL EVERY 12 HOURS SCHEDULED
Qty: 10 TABLET | Refills: 0 | Status: SHIPPED | OUTPATIENT
Start: 2023-08-25 | End: 2023-08-30

## 2023-08-25 RX ORDER — CLOBETASOL PROPIONATE 0.5 MG/G
CREAM TOPICAL 2 TIMES DAILY
Qty: 30 G | Refills: 0 | Status: SHIPPED | OUTPATIENT
Start: 2023-08-25

## 2023-08-25 NOTE — PATIENT INSTRUCTIONS
Add tamsulosin for 10 to 15 days. Cover with Bactrim in case this represents infected stone. If symptoms continue to get worse, ER evaluation for possible CAT scan. 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   You should have a follow-up GYN exam.

## 2023-08-25 NOTE — PROGRESS NOTES
[place]        NAME: Kam Yanez is a 68 y.o. female  : 1950    MRN: [id number]  DATE: 2023  TIME: 11:07 AM    Assessment and Plan   Kidney stone on left side [N20.0]  1. Kidney stone on left side  tamsulosin (FLOMAX) 0.4 mg      2. Acute cystitis without hematuria  sulfamethoxazole-trimethoprim (BACTRIM DS) 800-160 mg per tablet    POCT urine dip      3. Lichen sclerosus et atrophicus  clobetasol (TEMOVATE) 0.05 % cream          No problem-specific Assessment & Plan notes found for this encounter. Patient Instructions     Patient Instructions   Add tamsulosin for 10 to 15 days. Cover with Bactrim in case this represents infected stone. If symptoms continue to get worse, ER evaluation for possible CAT scan. 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 You should have a follow-up GYN exam.          Chief Complaint     Chief Complaint   Patient presents with   • Follow-up     Er visit, back pain has returned. History of Present Illness       Patient to follow-up on left-sided flank pain. Had been in [facility] emergency room diagnosed with 3 mm kidney stone. She had improved at home however over the last several days had return of her symptoms. No blood. No UTI symptoms are reported. Some pressure. No fevers. Mild nausea. Patient has history of vaginal lichen sclerosis. Has seen gynecologist several years ago who recommended clobetasol. Needs a new prescription. Review of Systems   Review of Systems   Respiratory: Negative for cough, chest tightness and shortness of breath. Cardiovascular: Negative for chest pain, palpitations and leg swelling. Gastrointestinal: Positive for abdominal pain. Negative for blood in stool, diarrhea and nausea. Genitourinary: Positive for flank pain. Negative for difficulty urinating, dysuria, frequency, hematuria and urgency.    Musculoskeletal: Negative for back pain.         Current Medications       Current Outpatient Medications:   •  atorvastatin (LIPITOR) 20 mg tablet, Take 1 tablet (20 mg total) by mouth daily, Disp: 30 tablet, Rfl: 0  •  clobetasol (TEMOVATE) 0.05 % cream, Apply topically 2 (two) times a day, Disp: 30 g, Rfl: 0  •  sulfamethoxazole-trimethoprim (BACTRIM DS) 800-160 mg per tablet, Take 1 tablet by mouth every 12 (twelve) hours for 5 days, Disp: 10 tablet, Rfl: 0  •  tamsulosin (FLOMAX) 0.4 mg, Take 1 capsule (0.4 mg total) by mouth daily with dinner, Disp: 10 capsule, Rfl: 0    Current Allergies     Allergies as of 08/25/2023   • (No Known Allergies)            The following portions of the patient's history were reviewed and updated as appropriate: allergies, current medications, past family history, past medical history, past social history, past surgical history and problem list.     Past Medical History:   Diagnosis Date   • Anxiety    • Arthritis    • Baker cyst, left    • Cholelithiasis    • Chronic headaches    • GERD (gastroesophageal reflux disease)    • Heart murmur    • Hyperlipidemia    • Menorrhagia    • Sinus infection        Past Surgical History:   Procedure Laterality Date   • CHOLECYSTECTOMY     • IR ASPIRATION BAKERS CYST Left    • LAPAROSCOPIC CHOLECYSTECTOMY         Family History   Problem Relation Age of Onset   • Breast cancer Mother 46   • Lung cancer Mother    • Diabetes Mother    • Stroke Father    • No Known Problems Maternal Grandmother    • No Known Problems Maternal Grandfather    • No Known Problems Paternal Grandmother    • No Known Problems Paternal Grandfather    • Prostate cancer Brother    • No Known Problems Brother    • No Known Problems Brother    • No Known Problems Brother    • No Known Problems Brother    • Lymphoma Brother    • No Known Problems Paternal Aunt    • Substance Abuse Neg Hx    • Mental illness Neg Hx    • Colon cancer Neg Hx    • Colon polyps Neg Hx          Medications have been verified. Objective   /78 (BP Location: Left arm, Patient Position: Sitting, Cuff Size: Adult)   Pulse 77   Temp (!) 97.4 °F (36.3 °C) (Tympanic)   Resp 18   Ht 5' 0.25" (1.53 m)   Wt 57.2 kg (126 lb)   LMP  (LMP Unknown)   SpO2 98%   BMI 24.40 kg/m²        Physical Exam     Physical Exam  Constitutional:       General: She is not in acute distress. Appearance: She is well-developed. She is not ill-appearing. Eyes:      Conjunctiva/sclera: Conjunctivae normal.      Pupils: Pupils are equal, round, and reactive to light. Neck:      Thyroid: No thyromegaly. Cardiovascular:      Rate and Rhythm: Normal rate and regular rhythm. Heart sounds: Normal heart sounds. No murmur heard. Pulmonary:      Effort: Pulmonary effort is normal. No respiratory distress. Breath sounds: Normal breath sounds. Abdominal:      General: Bowel sounds are normal. There is no distension. Palpations: Abdomen is soft. There is no splenomegaly or mass. Tenderness: There is abdominal tenderness in the left lower quadrant. There is no right CVA tenderness, left CVA tenderness, guarding or rebound. Musculoskeletal:      Cervical back: Normal range of motion and neck supple.

## 2023-08-29 ENCOUNTER — TELEPHONE (OUTPATIENT)
Dept: FAMILY MEDICINE CLINIC | Facility: CLINIC | Age: 73
End: 2023-08-29

## 2023-08-29 DIAGNOSIS — N20.0 KIDNEY STONE ON LEFT SIDE: Primary | ICD-10-CM

## 2023-08-29 NOTE — TELEPHONE ENCOUNTER
No bowl changed and no fevers.  Just pressure on on the left abdomin and back      Pt will schedule the CT

## 2023-08-29 NOTE — TELEPHONE ENCOUNTER
Pt called and left message below    ---- Sofie, this is Maxi Lin calling just t Doctor Toi Quintanilla last Friday, and he said if I didn't feel any better, I should call back. He tried to get another CAT scan with contrast. I'm still having the pain on the left, outside of my back and abdomen. My phone number, [phone]. Thank you.

## 2023-08-29 NOTE — TELEPHONE ENCOUNTER
Persistent left lower quadrant pain with flank pain. Previous CT 8/7/2023 had left-sided kidney stone.

## 2023-08-31 DIAGNOSIS — N20.0 KIDNEY STONE ON LEFT SIDE: ICD-10-CM

## 2023-08-31 RX ORDER — TAMSULOSIN HYDROCHLORIDE 0.4 MG/1
CAPSULE ORAL
Qty: 10 CAPSULE | Refills: 0 | OUTPATIENT
Start: 2023-08-31

## 2023-09-05 ENCOUNTER — HOSPITAL ENCOUNTER (OUTPATIENT)
Dept: RADIOLOGY | Facility: HOSPITAL | Age: 73
Discharge: HOME | End: 2023-09-05
Payer: MEDICARE

## 2023-09-05 DIAGNOSIS — N20.0 KIDNEY STONE ON LEFT SIDE: ICD-10-CM

## 2023-09-05 PROCEDURE — G1004 CDSM NDSC: HCPCS

## 2023-09-05 PROCEDURE — 74176 CT ABD & PELVIS W/O CONTRAST: CPT

## 2023-09-13 ENCOUNTER — TELEPHONE (OUTPATIENT)
Dept: FAMILY MEDICINE CLINIC | Facility: CLINIC | Age: 73
End: 2023-09-13

## 2023-09-13 NOTE — RESULT ENCOUNTER NOTE
Call patient with lab result-CT scan does not show any reason for left lower abdominal pain. No kidney stones noted. Should probably consider GI consultation and rather than repeat Cologuard have a colonoscopy.

## 2023-09-13 NOTE — TELEPHONE ENCOUNTER
Pt aware       ----- Message from Moreno Gonzales MD sent at 9/13/2023  9:04 AM EDT -----  Call patient with lab result-CT scan does not show any reason for left lower abdominal pain. No kidney stones noted. Should probably consider GI consultation and rather than repeat Cologuard have a colonoscopy.

## 2023-09-29 ENCOUNTER — OFFICE VISIT (OUTPATIENT)
Dept: GASTROENTEROLOGY | Facility: CLINIC | Age: 73
End: 2023-09-29
Payer: MEDICARE

## 2023-09-29 VITALS
SYSTOLIC BLOOD PRESSURE: 118 MMHG | BODY MASS INDEX: 25.13 KG/M2 | WEIGHT: 128 LBS | HEIGHT: 60 IN | DIASTOLIC BLOOD PRESSURE: 72 MMHG

## 2023-09-29 DIAGNOSIS — R10.84 GENERALIZED ABDOMINAL PAIN: Primary | ICD-10-CM

## 2023-09-29 DIAGNOSIS — Z12.11 COLON CANCER SCREENING: ICD-10-CM

## 2023-09-29 DIAGNOSIS — K21.9 GASTROESOPHAGEAL REFLUX DISEASE WITHOUT ESOPHAGITIS: ICD-10-CM

## 2023-09-29 PROCEDURE — 99214 OFFICE O/P EST MOD 30 MIN: CPT | Performed by: PHYSICIAN ASSISTANT

## 2023-09-29 RX ORDER — OMEPRAZOLE 20 MG/1
20 CAPSULE, DELAYED RELEASE ORAL DAILY
Qty: 90 CAPSULE | Refills: 3 | Status: SHIPPED | OUTPATIENT
Start: 2023-09-29

## 2023-09-29 RX ORDER — OMEPRAZOLE 20 MG/1
20 CAPSULE, DELAYED RELEASE ORAL DAILY
Qty: 90 CAPSULE | Refills: 3 | Status: SHIPPED | OUTPATIENT
Start: 2023-09-29 | End: 2023-09-29

## 2023-09-29 NOTE — PROGRESS NOTES
[facility] - Outpatient Follow-up Note  Kam Yanez 68 y.o. female MRN: [id number]  Encounter: [id number]    ASSESSMENT AND PLAN:    1. Generalized abdominal pain  · Acute, postprandial  · DDx includes poorly controlled GERD, PUD, esophagitis  · Recommend EGD to r/o PUD and assess for GERD/complications - patient does not feel comfortable proceeding with procedural sedation at this time and would prefer to trial medication before proceeding with EGD. Procedure risks and preparation discussed in detail. · START omeprazole 20 mg QAM 30 min prior to breakfast  · Diet/lifestyle modifications for GERD - handout provided  · Follow-up in 6 weeks  · If no/minimal improvement, recommend proceeding with EGD + colonoscopy  - omeprazole (PriLOSEC) 20 mg delayed release capsule; Take 1 capsule (20 mg total) by mouth daily  Dispense: 90 capsule; Refill: 3    2. Gastroesophageal reflux disease without esophagitis  · Chronic, uncontrolled  · Plan as above  - omeprazole (PriLOSEC) 20 mg delayed release capsule; Take 1 capsule (20 mg total) by mouth daily  Dispense: 90 capsule; Refill: 3    3. Colon cancer screening  · Last colonoscopy: COLOGUARD 2021  recall: 3 yr(s)  due: 2024  · Recommend colonoscopy with c/o LLQ pain - patient does not feel comfortable proceeding with procedural sedation at this time and would prefer to treat pain as GERD vs PUD with PPI  · If no improvement in pain after 6 weeks on PPI therapy, she is agreeable to proceed with EGD + colonoscopy    Return in about 6 weeks (around 11/10/2023) for with ME.  ______________________________________________________________________    Chief Complaint   Patient presents with   • Abdominal Pain     Referred by pcp, CT scan results       HPI:   Accompanied by self and history is obtained from self. Patient is a 68 y.o. female with a significant PMH of GERD presenting for abdominal pain, abnormal CT scan.     HPI    Abdominal pain  Seen at ED 6 weeks ago, dx with small kidney stone  -since passed the kidney stone  Continues to have abdominal pain  Repeat CT scan does no show renal calculi  Pain worse after eating - sx worse after red sauce, fried foods  Pain improves with TUMS typically  +gas, +belching  Moving her bowels regularly  Does feel the urge to move her bowels 20-30 min after eating    GERD  Has not been taking any medication for this  -stopped famotidine about 1 yr ago - reflux was controlled on this  Will have abdominal pain if she lays down after eating  Denies heartburn, regurgitation, N/V    Workup to date:   EGD: none - declined due to hesitation re: procedural sedation  Colonoscopy: due   Recent GI imagin2023: "Impression: No urinary calculi identified. No findings to account for left lower quadrant pain. Moderate hiatal hernia."    Review of Systems   Constitutional: Negative for appetite change, chills, fever and unexpected weight change. HENT: Negative for dental problem, mouth sores, sore throat, trouble swallowing and voice change. Respiratory: Negative for cough and choking. Cardiovascular: Negative for chest pain and leg swelling. Gastrointestinal: Positive for abdominal pain. Negative for abdominal distention, anal bleeding, blood in stool, constipation, diarrhea, nausea, rectal pain and vomiting.        +belching, +gas   Skin: Negative for pallor and rash. Neurological: Negative for weakness, light-headedness and headaches. Psychiatric/Behavioral: Negative for confusion and sleep disturbance. The patient is not nervous/anxious.         Historical Information   Past Medical History:   Diagnosis Date   • Anxiety    • Arthritis    • Baker cyst, left    • Cholelithiasis    • Chronic headaches    • GERD (gastroesophageal reflux disease)    • Heart murmur    • Hyperlipidemia    • Menorrhagia    • Sinus infection      Past Surgical History:   Procedure Laterality Date   • CHOLECYSTECTOMY     • IR ASPIRATION BAKERS CYST Left    • LAPAROSCOPIC CHOLECYSTECTOMY       Social History     Substance and Sexual Activity   Alcohol Use Yes   • Alcohol/week: 1.0 standard drink of alcohol   • Types: 1 Cans of beer per week     Social History     Substance and Sexual Activity   Drug Use Never     Social History     Tobacco Use   Smoking Status Former   • Packs/day: 0.50   • Years: 20.00   • Total pack years: 10.00   • Types: Cigarettes   • Quit date: 1999   • Years since quittin.8   Smokeless Tobacco Never     Family History   Problem Relation Age of Onset   • Breast cancer Mother 46   • Lung cancer Mother    • Diabetes Mother    • Stroke Father    • No Known Problems Maternal Grandmother    • No Known Problems Maternal Grandfather    • No Known Problems Paternal Grandmother    • No Known Problems Paternal Grandfather    • Prostate cancer Brother    • No Known Problems Brother    • No Known Problems Brother    • No Known Problems Brother    • No Known Problems Brother    • Lymphoma Brother    • No Known Problems Paternal Aunt    • Substance Abuse Neg Hx    • Mental illness Neg Hx    • Colon cancer Neg Hx    • Colon polyps Neg Hx          Current Outpatient Medications:   •  atorvastatin (LIPITOR) 20 mg tablet  •  omeprazole (PriLOSEC) 20 mg delayed release capsule  •  clobetasol (TEMOVATE) 0.05 % cream  •  tamsulosin (FLOMAX) 0.4 mg  No Known Allergies  Reviewed medications and allergies and updated as indicated    PHYSICAL EXAM:    Blood pressure 118/72, height 5' (1.524 m), weight 58.1 kg (128 lb), not currently breastfeeding. Body mass index is 25 kg/m². Physical Exam  Vitals and nursing note reviewed. Constitutional:       General: She is not in acute distress. Appearance: She is not toxic-appearing. HENT:      Head: Normocephalic. Mouth/Throat:      Mouth: Mucous membranes are moist.      Pharynx: Oropharynx is clear. Eyes:      General: No scleral icterus.      Extraocular Movements: Extraocular movements intact. Neck:      Trachea: Phonation normal.   Cardiovascular:      Rate and Rhythm: Normal rate and regular rhythm. Heart sounds: No murmur heard. No friction rub. No gallop. Pulmonary:      Effort: Pulmonary effort is normal. No respiratory distress. Breath sounds: No wheezing, rhonchi or rales. Abdominal:      General: Bowel sounds are normal. There is no distension or abdominal bruit. Palpations: Abdomen is soft. There is no hepatomegaly or splenomegaly. Tenderness: There is abdominal tenderness in the left upper quadrant and left lower quadrant. There is no guarding or rebound. Musculoskeletal:      Cervical back: Neck supple. Comments: Moving all 4 extremities spontaneously   Skin:     General: Skin is warm and dry. Capillary Refill: Capillary refill takes less than 2 seconds. Coloration: Skin is not jaundiced or pale. Neurological:      General: No focal deficit present. Mental Status: She is alert and oriented to person, place, and time. Psychiatric:         Behavior: Behavior normal. Behavior is cooperative. Thought Content:  Thought content normal.         Lab Results:   Lab Results   Component Value Date    WBC 6.2 05/06/2022    WBC 5.5 10/12/2020    HGB 13.9 05/06/2022    HGB 12.9 10/12/2020    MCV 93.3 05/06/2022     05/06/2022     10/12/2020     Lab Results   Component Value Date     09/06/2017    K 4.3 03/09/2023     03/09/2023    CO2 26 03/09/2023    BUN 13 03/09/2023    CREATININE 0.70 03/09/2023    CALCIUM 9.6 03/09/2023    AST 24 03/09/2023    AST 28 02/12/2022    AST 24 11/05/2021    ALT 18 03/09/2023    ALT 27 02/12/2022    ALT 19 11/05/2021    ALKPHOS 55 03/09/2023    ALKPHOS 56 02/12/2022    ALKPHOS 72 11/05/2021    PROT 6.7 09/06/2017    BILITOT 1.0 09/06/2017    EGFR 92 03/09/2023       Radiology Results:   CT renal stone study abdomen pelvis wo contrast    Result Date: 9/13/2023  Narrative: CT ABDOMEN AND PELVIS WITHOUT IV CONTRAST - LOW DOSE RENAL STONE INDICATION:   N20.0: Calculus of kidney. COMPARISON:  None. TECHNIQUE:  Low radiation dose thin section CT examination of the abdomen and pelvis was performed without intravenous or oral contrast according to a protocol specifically designed to evaluate for urinary tract calculus. Axial, sagittal, and coronal 2D  reformatted images were created from the source data and submitted for interpretation. Evaluation for pathology in the abdomen and pelvis that is unrelated to urinary tract calculi is limited. Radiation dose length product (DLP) for this visit:  117.86 mGy-cm . This examination, like all CT scans performed in the [facility], was performed utilizing techniques to minimize radiation dose exposure, including the use of iterative  reconstruction and automated exposure control. URINARY TRACT FINDINGS: RIGHT KIDNEY AND URETER:  No urinary tract calculi. No hydronephrosis or hydroureter. LEFT KIDNEY AND URETER:  No urinary tract calculi. No hydronephrosis or hydroureter. URINARY BLADDER:  Unremarkable. ADDITIONAL FINDINGS: LOWER CHEST: Lung bases are clear. Moderate sliding-type hiatal hernia noted. SOLID VISCERA: Limited low radiation dose noncontrast CT evaluation demonstrates no clinically significant abnormality of the imaged portions of the liver, spleen, pancreas, or adrenal glands. GALLBLADDER/BILIARY TREE: Gallbladder is surgically absent. No biliary dilatation. STOMACH AND BOWEL:  Unremarkable. APPENDIX: A normal appendix was visualized. ABDOMINOPELVIC CAVITY:  No ascites. No pneumoperitoneum. No lymphadenopathy. VESSELS: Unremarkable for patient's age. REPRODUCTIVE ORGANS:  Unremarkable for patient's age. ABDOMINAL WALL/INGUINAL REGIONS:  Unremarkable. OSSEOUS STRUCTURES:  No acute fracture or destructive osseous lesion. Impression: No urinary calculi identified.  No findings to account for left lower quadrant pain. Moderate hiatal hernia. Workstation performed: [id number]         Patient expressed understanding and had all questions and concerns addressed. Estefania Gillis PA-C  09/29/23  8:44 AM    This chart was completed in part utilizing [employer] speech voice recognition software. Random word insertions, pronoun errors, and incomplete sentences are an occasional consequence of this system due to software limitations, and ambient noise. Any questions or concerns about the content, text, or information contained within the body of this dictation should be directly addressed to the provider for clarification.

## 2023-09-29 NOTE — PATIENT INSTRUCTIONS
Reviewed anti-reflux measures:  Eat smaller more frequent meals  Wait 3 hours between eating and laying down  Raise the head of the bed six inches or 30 degrees  Discussed foods to avoid: spicy foods, fatty foods, hot dogs, ribs, sausages, cream, citrus fruit juices, garlic/onion, coffee/tea, tomato-based foods, chocolate, spearmint, peppermint, carbonated beverages (fizzy drinks), alcohol    Good foods for reflux:  High-fiber foods  Fibrous foods make you feel full so you're less likely to overeat, which may contribute to heartburn. So, load up on healthy fiber from these foods:    -Whole grains such as oatmeal, couscous and brown rice. -Root vegetables such as sweet potatoes, carrots and beets. -Green vegetables such as asparagus, broccoli and green beans. Alkaline foods  Foods fall somewhere along the pH scale (an indicator of acid levels). Those that have a low pH are acidic and more likely to cause reflux. Those with higher pH are alkaline and can help offset strong stomach acid. Alkaline foods include:    -Bananas  -Melons  -Cauliflower  -Fennel  -Nuts    Watery foods  Eating foods that contain a lot of water can dilute and weaken stomach acid.  Choose foods such as:    -Celery  -Cucumber  -Lettuce  -Watermelon  -Broth-based soups  -Herbal tea

## 2023-10-13 ENCOUNTER — TELEPHONE (OUTPATIENT)
Age: 73
End: 2023-10-13

## 2023-10-13 NOTE — TELEPHONE ENCOUNTER
Patients GI provider:  Krystal Griggs     Number to return call: ( [phone]    Reason for call: Pt calling to ask if she should continue taking the omeprazole since she is feeling better    Scheduled procedure/appointment date if applicable:  39/33/64

## 2023-10-13 NOTE — TELEPHONE ENCOUNTER
Returned call to patient with no answer. Message to return our call, along with our phone number left.

## 2023-10-13 NOTE — TELEPHONE ENCOUNTER
Pt returned call. Tried calling GI triage and [facility], was unable to get a hold of anyone most likely due to the time. Please call back pt regarding the omeprazole.

## 2023-10-16 NOTE — TELEPHONE ENCOUNTER
MITA:  I spoke with patient and advised she continue on the omeprazole 20 mg dose until her follow up in November. Medications will be discussed and reviewed at that time. Patient is agreeable to plan.

## 2023-11-22 ENCOUNTER — OFFICE VISIT (OUTPATIENT)
Dept: GASTROENTEROLOGY | Facility: CLINIC | Age: 73
End: 2023-11-22
Payer: MEDICARE

## 2023-11-22 VITALS
DIASTOLIC BLOOD PRESSURE: 82 MMHG | WEIGHT: 129 LBS | SYSTOLIC BLOOD PRESSURE: 120 MMHG | BODY MASS INDEX: 25.32 KG/M2 | HEIGHT: 60 IN

## 2023-11-22 DIAGNOSIS — K21.9 GASTROESOPHAGEAL REFLUX DISEASE WITHOUT ESOPHAGITIS: Primary | ICD-10-CM

## 2023-11-22 DIAGNOSIS — Z12.11 COLON CANCER SCREENING: ICD-10-CM

## 2023-11-22 PROCEDURE — 99214 OFFICE O/P EST MOD 30 MIN: CPT | Performed by: PHYSICIAN ASSISTANT

## 2023-11-22 RX ORDER — OMEPRAZOLE 20 MG/1
20 CAPSULE, DELAYED RELEASE ORAL DAILY
Qty: 90 CAPSULE | Refills: 3 | Status: SHIPPED | OUTPATIENT
Start: 2023-11-22

## 2023-11-22 NOTE — PROGRESS NOTES
[facility]  Gilda Cardenas 68 y.o. female MRN: [id number]  Encounter: [id number]    ASSESSMENT AND PLAN:    1. Gastroesophageal reflux disease without esophagitis  Chronic, stable and well controlled on omeprazole 20 mg QAM  Patient would like to trial off medication - I am OK with this, recommend she go back on if sx return, Rx sent to pharmacy in case she needs it  Will hold off on EGD for now with patient feeling better on PPI, no red flag sx, and her concerns for procedural sedation. If sx return/worsen on adequate PPI therapy, she is agreeable to proceed with EGD at that time  Recommend diet/lifestyle modifications, handouts provided  Follow-up in 1 year for med check, OK to cancel appointment if sx controlled off of medication  - omeprazole (PriLOSEC) 20 mg delayed release capsule; Take 1 capsule (20 mg total) by mouth daily Take 30-60 minutes before your first meal of the day  Dispense: 90 capsule; Refill: 3    2. Colon cancer screening  Negative Cologuard: 10/2020  recall: 3 yrs  due: 10/2023  - Cologuard      Return in about 1 year (around 11/22/2024) for med check.  ______________________________________________________________________    Chief Complaint   Patient presents with    Follow up-abd. pain       HPI:   Accompanied by self and history is obtained from self. Patient is a 68 y.o. female with a significant PMH of GERD presenting for follow up regarding abdominal pain. HPI    Abdominal pain  Last seen in the office by myself 9/29  Started on trial of omeprazole 20 mg QAM for suspected UGI sx  Patient declined EGD - concerns re: procedural sedation    Feeling much better on omeprazole!   Missed 1 dose but did not notice much difference    Due for Cologuard, would like Rx for this    Workup to date:   EGD: none - declined due to hesitation re: procedural sedation  Colonoscopy: had Cologuard 10/2020, screening due 10/2023  Recent GI imaging: CT A/P 2023: "Impression: No urinary calculi identified. No findings to account for left lower quadrant pain. Moderate hiatal hernia. Review of Systems   Constitutional:  Negative for appetite change, chills, fever and unexpected weight change. HENT:  Negative for dental problem, mouth sores, sore throat, trouble swallowing and voice change. Respiratory:  Negative for cough and choking. Cardiovascular:  Negative for chest pain and leg swelling. Gastrointestinal:  Negative for abdominal distention, abdominal pain, anal bleeding, blood in stool, constipation, diarrhea, nausea, rectal pain and vomiting. Skin:  Negative for pallor and rash. Neurological:  Negative for weakness, light-headedness and headaches. Psychiatric/Behavioral:  Negative for confusion and sleep disturbance. The patient is not nervous/anxious.         Historical Information   Past Medical History:   Diagnosis Date    Anxiety     Arthritis     Baker cyst, left     Cholelithiasis     Chronic headaches     GERD (gastroesophageal reflux disease)     Heart murmur     Hyperlipidemia     Menorrhagia     Sinus infection      Past Surgical History:   Procedure Laterality Date    CHOLECYSTECTOMY      IR ASPIRATION BAKERS CYST Left     LAPAROSCOPIC CHOLECYSTECTOMY       Social History     Substance and Sexual Activity   Alcohol Use Yes    Alcohol/week: 1.0 standard drink of alcohol    Types: 1 Cans of beer per week     Social History     Substance and Sexual Activity   Drug Use Never     Social History     Tobacco Use   Smoking Status Former    Packs/day: 0.50    Years: 20.00    Total pack years: 10.00    Types: Cigarettes    Quit date: 1999    Years since quittin.9   Smokeless Tobacco Never     Family History   Problem Relation Age of Onset    Breast cancer Mother 46    Lung cancer Mother     Diabetes Mother     Stroke Father     No Known Problems Maternal Grandmother     No Known Problems Maternal Grandfather No Known Problems Paternal Grandmother     No Known Problems Paternal Grandfather     Prostate cancer Brother     No Known Problems Brother     No Known Problems Brother     No Known Problems Brother     No Known Problems Brother     Lymphoma Brother     No Known Problems Paternal Aunt     Substance Abuse Neg Hx     Mental illness Neg Hx     Colon cancer Neg Hx     Colon polyps Neg Hx          Current Outpatient Medications:     atorvastatin (LIPITOR) 20 mg tablet    omeprazole (PriLOSEC) 20 mg delayed release capsule    clobetasol (TEMOVATE) 0.05 % cream    tamsulosin (FLOMAX) 0.4 mg  No Known Allergies  Reviewed medications and allergies and updated as indicated    PHYSICAL EXAM:    Blood pressure 120/82, height 5' (1.524 m), weight 58.5 kg (129 lb), not currently breastfeeding. Body mass index is 25.19 kg/m². Physical Exam  Vitals and nursing note reviewed. Constitutional:       General: She is not in acute distress. Appearance: She is not toxic-appearing. HENT:      Head: Normocephalic. Mouth/Throat:      Mouth: Mucous membranes are moist.      Pharynx: Oropharynx is clear. Eyes:      General: No scleral icterus. Extraocular Movements: Extraocular movements intact. Neck:      Trachea: Phonation normal.   Cardiovascular:      Comments: Appears well perfused  Pulmonary:      Effort: Pulmonary effort is normal. No respiratory distress. Musculoskeletal:      Cervical back: Neck supple. Comments: Moving all 4 extremities spontaneously   Skin:     General: Skin is warm and dry. Capillary Refill: Capillary refill takes less than 2 seconds. Coloration: Skin is not jaundiced or pale. Neurological:      General: No focal deficit present. Mental Status: She is alert and oriented to person, place, and time. Psychiatric:         Behavior: Behavior normal. Behavior is cooperative. Thought Content:  Thought content normal.         Lab Results:   Lab Results Component Value Date    WBC 6.2 05/06/2022    WBC 5.5 10/12/2020    HGB 13.9 05/06/2022    HGB 12.9 10/12/2020    MCV 93.3 05/06/2022     05/06/2022     10/12/2020     Lab Results   Component Value Date     09/06/2017    K 4.3 03/09/2023     03/09/2023    CO2 26 03/09/2023    BUN 13 03/09/2023    CREATININE 0.70 03/09/2023    CALCIUM 9.6 03/09/2023    AST 24 03/09/2023    AST 28 02/12/2022    AST 24 11/05/2021    ALT 18 03/09/2023    ALT 27 02/12/2022    ALT 19 11/05/2021    ALKPHOS 55 03/09/2023    ALKPHOS 56 02/12/2022    ALKPHOS 72 11/05/2021    PROT 6.7 09/06/2017    BILITOT 1.0 09/06/2017    EGFR 92 03/09/2023       Radiology Results:   No results found. Patient expressed understanding and had all questions and concerns addressed. Leeanna Zuleta PA-C  11/22/23  9:48 AM    This chart was completed in part utilizing [employer] speech voice recognition software. Random word insertions, pronoun errors, and incomplete sentences are an occasional consequence of this system due to software limitations, and ambient noise. Any questions or concerns about the content, text, or information contained within the body of this dictation should be directly addressed to the provider for clarification.

## 2023-12-08 DIAGNOSIS — E78.00 HYPERCHOLESTEREMIA: ICD-10-CM

## 2023-12-08 RX ORDER — ATORVASTATIN CALCIUM 20 MG/1
20 TABLET, FILM COATED ORAL DAILY
Qty: 30 TABLET | Refills: 0
Start: 2023-12-08

## 2023-12-26 LAB — COLOGUARD RESULT REPORTABLE: NEGATIVE

## 2024-01-08 DIAGNOSIS — E78.00 HYPERCHOLESTEREMIA: ICD-10-CM

## 2024-01-08 RX ORDER — ATORVASTATIN CALCIUM 20 MG/1
20 TABLET, FILM COATED ORAL DAILY
Qty: 30 TABLET | Refills: 0 | Status: SHIPPED | OUTPATIENT
Start: 2024-01-08

## 2024-01-11 DIAGNOSIS — E78.00 HYPERCHOLESTEREMIA: Primary | ICD-10-CM

## 2024-01-17 LAB
ALBUMIN SERPL-MCNC: 3.9 G/DL (ref 3.6–5.1)
ALBUMIN/GLOB SERPL: 1.4 (CALC) (ref 1–2.5)
ALP SERPL-CCNC: 50 U/L (ref 37–153)
ALT SERPL-CCNC: 17 U/L (ref 6–29)
AST SERPL-CCNC: 21 U/L (ref 10–35)
BASOPHILS # BLD AUTO: 87 CELLS/UL (ref 0–200)
BASOPHILS NFR BLD AUTO: 1.4 %
BILIRUB SERPL-MCNC: 1.5 MG/DL (ref 0.2–1.2)
BUN SERPL-MCNC: 16 MG/DL (ref 7–25)
BUN/CREAT SERPL: ABNORMAL (CALC) (ref 6–22)
CALCIUM SERPL-MCNC: 9.3 MG/DL (ref 8.6–10.4)
CHLORIDE SERPL-SCNC: 107 MMOL/L (ref 98–110)
CHOLEST SERPL-MCNC: 204 MG/DL
CHOLEST/HDLC SERPL: 2.3 (CALC)
CO2 SERPL-SCNC: 26 MMOL/L (ref 20–32)
CREAT SERPL-MCNC: 0.62 MG/DL (ref 0.6–1)
EOSINOPHIL # BLD AUTO: 180 CELLS/UL (ref 15–500)
EOSINOPHIL NFR BLD AUTO: 2.9 %
ERYTHROCYTE [DISTWIDTH] IN BLOOD BY AUTOMATED COUNT: 12.1 % (ref 11–15)
GFR/BSA.PRED SERPLBLD CYS-BASED-ARV: 94 ML/MIN/1.73M2
GLOBULIN SER CALC-MCNC: 2.7 G/DL (CALC) (ref 1.9–3.7)
GLUCOSE SERPL-MCNC: 89 MG/DL (ref 65–99)
HCT VFR BLD AUTO: 41.4 % (ref 35–45)
HDLC SERPL-MCNC: 87 MG/DL
HGB BLD-MCNC: 14.1 G/DL (ref 11.7–15.5)
LDLC SERPL CALC-MCNC: 97 MG/DL (CALC)
LYMPHOCYTES # BLD AUTO: 1810 CELLS/UL (ref 850–3900)
LYMPHOCYTES NFR BLD AUTO: 29.2 %
MCH RBC QN AUTO: 32 PG (ref 27–33)
MCHC RBC AUTO-ENTMCNC: 34.1 G/DL (ref 32–36)
MCV RBC AUTO: 93.9 FL (ref 80–100)
MONOCYTES # BLD AUTO: 546 CELLS/UL (ref 200–950)
MONOCYTES NFR BLD AUTO: 8.8 %
NEUTROPHILS # BLD AUTO: 3577 CELLS/UL (ref 1500–7800)
NEUTROPHILS NFR BLD AUTO: 57.7 %
NONHDLC SERPL-MCNC: 117 MG/DL (CALC)
PLATELET # BLD AUTO: 392 THOUSAND/UL (ref 140–400)
PMV BLD REES-ECKER: 9.7 FL (ref 7.5–12.5)
POTASSIUM SERPL-SCNC: 4.1 MMOL/L (ref 3.5–5.3)
PROT SERPL-MCNC: 6.6 G/DL (ref 6.1–8.1)
RBC # BLD AUTO: 4.41 MILLION/UL (ref 3.8–5.1)
SODIUM SERPL-SCNC: 140 MMOL/L (ref 135–146)
TRIGL SERPL-MCNC: 104 MG/DL
WBC # BLD AUTO: 6.2 THOUSAND/UL (ref 3.8–10.8)

## 2024-02-01 ENCOUNTER — OFFICE VISIT (OUTPATIENT)
Dept: FAMILY MEDICINE CLINIC | Facility: CLINIC | Age: 74
End: 2024-02-01
Payer: MEDICARE

## 2024-02-01 VITALS
WEIGHT: 133 LBS | HEIGHT: 60 IN | BODY MASS INDEX: 26.11 KG/M2 | SYSTOLIC BLOOD PRESSURE: 122 MMHG | DIASTOLIC BLOOD PRESSURE: 76 MMHG | OXYGEN SATURATION: 99 % | HEART RATE: 72 BPM | TEMPERATURE: 97.3 F

## 2024-02-01 DIAGNOSIS — M47.812 CERVICAL SPONDYLOSIS: ICD-10-CM

## 2024-02-01 DIAGNOSIS — Z00.00 MEDICARE ANNUAL WELLNESS VISIT, SUBSEQUENT: Primary | ICD-10-CM

## 2024-02-01 DIAGNOSIS — K21.9 GASTROESOPHAGEAL REFLUX DISEASE WITHOUT ESOPHAGITIS: ICD-10-CM

## 2024-02-01 DIAGNOSIS — Z12.31 SCREENING MAMMOGRAM FOR BREAST CANCER: ICD-10-CM

## 2024-02-01 DIAGNOSIS — Z12.39 SCREENING BREAST EXAMINATION: ICD-10-CM

## 2024-02-01 DIAGNOSIS — E78.00 HYPERCHOLESTEREMIA: ICD-10-CM

## 2024-02-01 PROCEDURE — 99214 OFFICE O/P EST MOD 30 MIN: CPT | Performed by: FAMILY MEDICINE

## 2024-02-01 PROCEDURE — G0439 PPPS, SUBSEQ VISIT: HCPCS | Performed by: FAMILY MEDICINE

## 2024-02-01 RX ORDER — FAMOTIDINE 20 MG/1
20 TABLET, FILM COATED ORAL
Qty: 90 TABLET | Refills: 3 | Status: SHIPPED | OUTPATIENT
Start: 2024-02-01 | End: 2025-01-26

## 2024-02-01 RX ORDER — ATORVASTATIN CALCIUM 20 MG/1
20 TABLET, FILM COATED ORAL DAILY
Qty: 90 TABLET | Refills: 3 | Status: SHIPPED | OUTPATIENT
Start: 2024-02-01

## 2024-02-01 NOTE — PROGRESS NOTES
Assessment and Plan:     Problem List Items Addressed This Visit       Hypercholesteremia    Relevant Medications    atorvastatin (LIPITOR) 20 mg tablet    Gastroesophageal reflux disease without esophagitis    Relevant Medications    famotidine (PEPCID) 20 mg tablet    Cervical spondylosis     Other Visit Diagnoses       Medicare annual wellness visit, subsequent    -  Primary            Depression Screening and Follow-up Plan: Patient was screened for depression during today's encounter. They screened negative with a PHQ-2 score of 0.      Preventive health issues were discussed with patient, and age appropriate screening tests were ordered as noted in patient's After Visit Summary.  Personalized health advice and appropriate referrals for health education or preventive services given if needed, as noted in patient's After Visit Summary.     History of Present Illness:     Patient presents for a Medicare Wellness Visit    HPI   Patient Care Team:  Alan Connor MD as PCP - General (Family Medicine)     Review of Systems:     Review of Systems   Constitutional:  Negative for fatigue, fever and unexpected weight change.   HENT:  Negative for congestion, sinus pain and sore throat.    Eyes:  Negative for visual disturbance.   Respiratory:  Negative for shortness of breath and wheezing.    Cardiovascular:  Negative for chest pain and palpitations.   Gastrointestinal:  Negative for abdominal pain, nausea and vomiting.   Musculoskeletal: Negative.  Negative for arthralgias and myalgias.   Neurological:  Negative for syncope, weakness and numbness.   Psychiatric/Behavioral: Negative.  Negative for confusion, dysphoric mood and suicidal ideas.         Problem List:     Patient Active Problem List   Diagnosis    Hypercholesteremia    Degenerative arthritis of thumb, right    Abnormal bone density screening    Arthritis    Gastroesophageal reflux disease without esophagitis    Chronic rhinitis    Cervicogenic headache     Cervical spondylosis      Past Medical and Surgical History:     Past Medical History:   Diagnosis Date    Anxiety     Arthritis     Baker cyst, left     Cholelithiasis     Chronic headaches     GERD (gastroesophageal reflux disease)     Heart murmur     Hyperlipidemia     Menorrhagia     Sinus infection      Past Surgical History:   Procedure Laterality Date    CHOLECYSTECTOMY      IR ASPIRATION BAKERS CYST Left     LAPAROSCOPIC CHOLECYSTECTOMY        Family History:     Family History   Problem Relation Age of Onset    Breast cancer Mother 52    Lung cancer Mother     Diabetes Mother     Stroke Father     No Known Problems Maternal Grandmother     No Known Problems Maternal Grandfather     No Known Problems Paternal Grandmother     No Known Problems Paternal Grandfather     Prostate cancer Brother     No Known Problems Brother     No Known Problems Brother     No Known Problems Brother     No Known Problems Brother     Lymphoma Brother     No Known Problems Paternal Aunt     Substance Abuse Neg Hx     Mental illness Neg Hx     Colon cancer Neg Hx     Colon polyps Neg Hx       Social History:     Social History     Socioeconomic History    Marital status: /Civil Union     Spouse name: Tre    Number of children: 2    Years of education: None    Highest education level: None   Occupational History    None   Tobacco Use    Smoking status: Former     Current packs/day: 0.00     Average packs/day: 0.5 packs/day for 20.0 years (10.0 ttl pk-yrs)     Types: Cigarettes     Start date: 1979     Quit date: 1999     Years since quittin.1    Smokeless tobacco: Never   Vaping Use    Vaping status: Never Used   Substance and Sexual Activity    Alcohol use: Yes     Alcohol/week: 1.0 standard drink of alcohol     Types: 1 Cans of beer per week    Drug use: Never    Sexual activity: None   Other Topics Concern    None   Social History Narrative    None     Social Determinants of Health     Financial  Resource Strain: Low Risk  (1/25/2024)    Overall Financial Resource Strain (CARDIA)     Difficulty of Paying Living Expenses: Not hard at all   Food Insecurity: Not on file   Transportation Needs: No Transportation Needs (1/25/2024)    PRAPARE - Transportation     Lack of Transportation (Medical): No     Lack of Transportation (Non-Medical): No   Physical Activity: Not on file   Stress: Not on file   Social Connections: Not on file   Intimate Partner Violence: Not on file   Housing Stability: Not on file      Medications and Allergies:     Current Outpatient Medications   Medication Sig Dispense Refill    atorvastatin (LIPITOR) 20 mg tablet Take 1 tablet (20 mg total) by mouth daily 90 tablet 3    famotidine (PEPCID) 20 mg tablet Take 1 tablet (20 mg total) by mouth daily at bedtime 90 tablet 3    omeprazole (PriLOSEC) 20 mg delayed release capsule Take 1 capsule (20 mg total) by mouth daily Take 30-60 minutes before your first meal of the day 90 capsule 3    clobetasol (TEMOVATE) 0.05 % cream Apply topically 2 (two) times a day (Patient not taking: Reported on 9/29/2023) 30 g 0    tamsulosin (FLOMAX) 0.4 mg Take 1 capsule (0.4 mg total) by mouth daily with dinner (Patient not taking: Reported on 11/22/2023) 10 capsule 0     No current facility-administered medications for this visit.     No Known Allergies   Immunizations:     Immunization History   Administered Date(s) Administered    COVID-19 PFIZER VACCINE 0.3 ML IM 03/07/2021, 03/28/2021, 10/14/2021    Influenza, high dose seasonal 0.7 mL 10/09/2020, 11/17/2021, 12/09/2022    Tdap 04/24/2006      Health Maintenance:         Topic Date Due    Breast Cancer Screening: Mammogram  02/09/2024    Colorectal Cancer Screening  12/18/2026    Hepatitis C Screening  Completed         Topic Date Due    Pneumococcal Vaccine: 65+ Years (1 - PCV) Never done    Influenza Vaccine (1) 09/01/2023    COVID-19 Vaccine (4 - 2023-24 season) 09/01/2023      Medicare Screening Tests  and Risk Assessments:     Last Medicare Wellness visit information reviewed, patient interviewed and updates made to the record today.      Health Risk Assessment:   Patient rates overall health as very good. Patient feels that their physical health rating is same. Patient is satisfied with their life. Eyesight was rated as same. Hearing was rated as same. Patient feels that their emotional and mental health rating is same. Patients states they are sometimes angry. Patient states they are sometimes unusually tired/fatigued. Pain experienced in the last 7 days has been some. Patient's pain rating has been 5/10. Patient states that she has experienced no weight loss or gain in last 6 months.     Depression Screening:   PHQ-2 Score: 0      Fall Risk Screening:   In the past year, patient has experienced: no history of falling in past year      Urinary Incontinence Screening:   Patient has not leaked urine accidently in the last six months.     Home Safety:  Patient does not have trouble with stairs inside or outside of their home. Patient has working smoke alarms and has working carbon monoxide detector. Home safety hazards include: none.     Nutrition:   Current diet is Regular.     Medications:   Patient is not currently taking any over-the-counter supplements. Patient is able to manage medications.     Activities of Daily Living (ADLs)/Instrumental Activities of Daily Living (IADLs):   Walk and transfer into and out of bed and chair?: Yes  Dress and groom yourself?: Yes    Bathe or shower yourself?: Yes    Feed yourself? Yes  Do your laundry/housekeeping?: Yes  Manage your money, pay your bills and track your expenses?: Yes  Make your own meals?: Yes    Do your own shopping?: Yes    Previous Hospitalizations:   Any hospitalizations or ED visits within the last 12 months?: No      Advance Care Planning:   Living will: No    Durable POA for healthcare: Yes    Advanced directive: Yes    Provider agrees with end of life  decisions: Yes      Cognitive Screening:   Provider or family/friend/caregiver concerned regarding cognition?: No    PREVENTIVE SCREENINGS      Cardiovascular Screening:    General: Screening Not Indicated and History Lipid Disorder      Diabetes Screening:     General: Screening Current      Colorectal Cancer Screening:     General: Screening Current      Breast Cancer Screening:     General: Screening Current      Cervical Cancer Screening:    General: Screening Not Indicated      Abdominal Aortic Aneurysm (AAA) Screening:        General: Screening Not Indicated      Lung Cancer Screening:     General: Screening Not Indicated      Hepatitis C Screening:    General: Screening Current    Screening, Brief Intervention, and Referral to Treatment (SBIRT)    Screening  Typical number of drinks in a day: 0  Typical number of drinks in a week: 1  Interpretation: Low risk drinking behavior.    AUDIT-C Screenin) How often did you have a drink containing alcohol in the past year? monthly or less  2) How many drinks did you have on a typical day when you were drinking in the past year? 1 to 2  3) How often did you have 6 or more drinks on one occasion in the past year? never    AUDIT-C Score: 1  Interpretation: Score 0-2 (female): Negative screen for alcohol misuse    Single Item Drug Screening:  How often have you used an illegal drug (including marijuana) or a prescription medication for non-medical reasons in the past year? never    Single Item Drug Screen Score: 0  Interpretation: Negative screen for possible drug use disorder    No results found.     Physical Exam:     /76 (BP Location: Left arm, Patient Position: Sitting, Cuff Size: Standard)   Pulse 72   Temp (!) 97.3 °F (36.3 °C) (Tympanic)   Ht 5' (1.524 m)   Wt 60.3 kg (133 lb)   LMP  (LMP Unknown)   SpO2 99%   BMI 25.97 kg/m²     Physical Exam  Vitals and nursing note reviewed.   Constitutional:       General: She is not in acute distress.      Appearance: She is well-developed.   HENT:      Head: Normocephalic and atraumatic.   Eyes:      Conjunctiva/sclera: Conjunctivae normal.   Cardiovascular:      Rate and Rhythm: Normal rate and regular rhythm.      Heart sounds: No murmur heard.  Pulmonary:      Effort: Pulmonary effort is normal. No respiratory distress.      Breath sounds: Normal breath sounds.   Abdominal:      Palpations: Abdomen is soft.      Tenderness: There is no abdominal tenderness.   Musculoskeletal:         General: No swelling.      Cervical back: Neck supple.   Skin:     General: Skin is warm and dry.      Capillary Refill: Capillary refill takes less than 2 seconds.   Neurological:      Mental Status: She is alert.   Psychiatric:         Mood and Affect: Mood normal.          Alan Connor MD

## 2024-02-01 NOTE — PATIENT INSTRUCTIONS
Medicare Preventive Visit Patient Instructions  Thank you for completing your Welcome to Medicare Visit or Medicare Annual Wellness Visit today. Your next wellness visit will be due in one year (2/1/2025).  The screening/preventive services that you may require over the next 5-10 years are detailed below. Some tests may not apply to you based off risk factors and/or age. Screening tests ordered at today's visit but not completed yet may show as past due. Also, please note that scanned in results may not display below.  Preventive Screenings:  Service Recommendations Previous Testing/Comments   Colorectal Cancer Screening  * Colonoscopy    * Fecal Occult Blood Test (FOBT)/Fecal Immunochemical Test (FIT)  * Fecal DNA/Cologuard Test  * Flexible Sigmoidoscopy Age: 45-75 years old   Colonoscopy: every 10 years (may be performed more frequently if at higher risk)  OR  FOBT/FIT: every 1 year  OR  Cologuard: every 3 years  OR  Sigmoidoscopy: every 5 years  Screening may be recommended earlier than age 45 if at higher risk for colorectal cancer. Also, an individualized decision between you and your healthcare provider will decide whether screening between the ages of 76-85 would be appropriate. Colonoscopy: 10/19/2020  FOBT/FIT: Not on file  Cologuard: 12/18/2023  Sigmoidoscopy: 10/19/2020          Breast Cancer Screening Age: 40+ years old  Frequency: every 1-2 years  Not required if history of left and right mastectomy Mammogram: 02/09/2023        Cervical Cancer Screening Between the ages of 21-29, pap smear recommended once every 3 years.   Between the ages of 30-65, can perform pap smear with HPV co-testing every 5 years.   Recommendations may differ for women with a history of total hysterectomy, cervical cancer, or abnormal pap smears in past. Pap Smear: Not on file        Hepatitis C Screening Once for adults born between 1945 and 1965  More frequently in patients at high risk for Hepatitis C Hep C Antibody:  12/14/2018        Diabetes Screening 1-2 times per year if you're at risk for diabetes or have pre-diabetes Fasting glucose: No results in last 5 years (No results in last 5 years)  A1C: No results in last 5 years (No results in last 5 years)      Cholesterol Screening Once every 5 years if you don't have a lipid disorder. May order more often based on risk factors. Lipid panel: 01/17/2024          Other Preventive Screenings Covered by Medicare:  Abdominal Aortic Aneurysm (AAA) Screening: covered once if your at risk. You're considered to be at risk if you have a family history of AAA.  Lung Cancer Screening: covers low dose CT scan once per year if you meet all of the following conditions: (1) Age 55-77; (2) No signs or symptoms of lung cancer; (3) Current smoker or have quit smoking within the last 15 years; (4) You have a tobacco smoking history of at least 20 pack years (packs per day multiplied by number of years you smoked); (5) You get a written order from a healthcare provider.  Glaucoma Screening: covered annually if you're considered high risk: (1) You have diabetes OR (2) Family history of glaucoma OR (3)  aged 50 and older OR (4)  American aged 65 and older  Osteoporosis Screening: covered every 2 years if you meet one of the following conditions: (1) You're estrogen deficient and at risk for osteoporosis based off medical history and other findings; (2) Have a vertebral abnormality; (3) On glucocorticoid therapy for more than 3 months; (4) Have primary hyperparathyroidism; (5) On osteoporosis medications and need to assess response to drug therapy.   Last bone density test (DXA Scan): 02/09/2023.  HIV Screening: covered annually if you're between the age of 15-65. Also covered annually if you are younger than 15 and older than 65 with risk factors for HIV infection. For pregnant patients, it is covered up to 3 times per pregnancy.    Immunizations:  Immunization Recommendations    Influenza Vaccine Annual influenza vaccination during flu season is recommended for all persons aged >= 6 months who do not have contraindications   Pneumococcal Vaccine   * Pneumococcal conjugate vaccine = PCV13 (Prevnar 13), PCV15 (Vaxneuvance), PCV20 (Prevnar 20)  * Pneumococcal polysaccharide vaccine = PPSV23 (Pneumovax) Adults 19-65 yo with certain risk factors or if 65+ yo  If never received any pneumonia vaccine: recommend Prevnar 20 (PCV20)  Give PCV20 if previously received 1 dose of PCV13 or PPSV23   Hepatitis B Vaccine 3 dose series if at intermediate or high risk (ex: diabetes, end stage renal disease, liver disease)   Respiratory syncytial virus (RSV) Vaccine - COVERED BY MEDICARE PART D  * RSVPreF3 (Arexvy) CDC recommends that adults 60 years of age and older may receive a single dose of RSV vaccine using shared clinical decision-making (SCDM)   Tetanus (Td) Vaccine - COST NOT COVERED BY MEDICARE PART B Following completion of primary series, a booster dose should be given every 10 years to maintain immunity against tetanus. Td may also be given as tetanus wound prophylaxis.   Tdap Vaccine - COST NOT COVERED BY MEDICARE PART B Recommended at least once for all adults. For pregnant patients, recommended with each pregnancy.   Shingles Vaccine (Shingrix) - COST NOT COVERED BY MEDICARE PART B  2 shot series recommended in those 19 years and older who have or will have weakened immune systems or those 50 years and older     Health Maintenance Due:      Topic Date Due   • Breast Cancer Screening: Mammogram  02/09/2024   • Colorectal Cancer Screening  12/18/2026   • Hepatitis C Screening  Completed     Immunizations Due:      Topic Date Due   • Pneumococcal Vaccine: 65+ Years (1 - PCV) Never done   • Influenza Vaccine (1) 09/01/2023   • COVID-19 Vaccine (4 - 2023-24 season) 09/01/2023     Advance Directives   What are advance directives?  Advance directives are legal documents that state your wishes and  plans for medical care. These plans are made ahead of time in case you lose your ability to make decisions for yourself. Advance directives can apply to any medical decision, such as the treatments you want, and if you want to donate organs.   What are the types of advance directives?  There are many types of advance directives, and each state has rules about how to use them. You may choose a combination of any of the following:  Living will:  This is a written record of the treatment you want. You can also choose which treatments you do not want, which to limit, and which to stop at a certain time. This includes surgery, medicine, IV fluid, and tube feedings.   Durable power of  for healthcare (DPAHC):  This is a written record that states who you want to make healthcare choices for you when you are unable to make them for yourself. This person, called a proxy, is usually a family member or a friend. You may choose more than 1 proxy.  Do not resuscitate (DNR) order:  A DNR order is used in case your heart stops beating or you stop breathing. It is a request not to have certain forms of treatment, such as CPR. A DNR order may be included in other types of advance directives.  Medical directive:  This covers the care that you want if you are in a coma, near death, or unable to make decisions for yourself. You can list the treatments you want for each condition. Treatment may include pain medicine, surgery, blood transfusions, dialysis, IV or tube feedings, and a ventilator (breathing machine).  Values history:  This document has questions about your views, beliefs, and how you feel and think about life. This information can help others choose the care that you would choose.  Why are advance directives important?  An advance directive helps you control your care. Although spoken wishes may be used, it is better to have your wishes written down. Spoken wishes can be misunderstood, or not followed. Treatments  may be given even if you do not want them. An advance directive may make it easier for your family to make difficult choices about your care.   Weight Management   Why it is important to manage your weight:  Being overweight increases your risk of health conditions such as heart disease, high blood pressure, type 2 diabetes, and certain types of cancer. It can also increase your risk for osteoarthritis, sleep apnea, and other respiratory problems. Aim for a slow, steady weight loss. Even a small amount of weight loss can lower your risk of health problems.  How to lose weight safely:  A safe and healthy way to lose weight is to eat fewer calories and get regular exercise. You can lose up about 1 pound a week by decreasing the number of calories you eat by 500 calories each day.   Healthy meal plan for weight management:  A healthy meal plan includes a variety of foods, contains fewer calories, and helps you stay healthy. A healthy meal plan includes the following:  Eat whole-grain foods more often.  A healthy meal plan should contain fiber. Fiber is the part of grains, fruits, and vegetables that is not broken down by your body. Whole-grain foods are healthy and provide extra fiber in your diet. Some examples of whole-grain foods are whole-wheat breads and pastas, oatmeal, brown rice, and bulgur.  Eat a variety of vegetables every day.  Include dark, leafy greens such as spinach, kale, zee greens, and mustard greens. Eat yellow and orange vegetables such as carrots, sweet potatoes, and winter squash.   Eat a variety of fruits every day.  Choose fresh or canned fruit (canned in its own juice or light syrup) instead of juice. Fruit juice has very little or no fiber.  Eat low-fat dairy foods.  Drink fat-free (skim) milk or 1% milk. Eat fat-free yogurt and low-fat cottage cheese. Try low-fat cheeses such as mozzarella and other reduced-fat cheeses.  Choose meat and other protein foods that are low in fat.  Choose  beans or other legumes such as split peas or lentils. Choose fish, skinless poultry (chicken or turkey), or lean cuts of red meat (beef or pork). Before you cook meat or poultry, cut off any visible fat.   Use less fat and oil.  Try baking foods instead of frying them. Add less fat, such as margarine, sour cream, regular salad dressing and mayonnaise to foods. Eat fewer high-fat foods. Some examples of high-fat foods include french fries, doughnuts, ice cream, and cakes.  Eat fewer sweets.  Limit foods and drinks that are high in sugar. This includes candy, cookies, regular soda, and sweetened drinks.  Exercise:  Exercise at least 30 minutes per day on most days of the week. Some examples of exercise include walking, biking, dancing, and swimming. You can also fit in more physical activity by taking the stairs instead of the elevator or parking farther away from stores. Ask your healthcare provider about the best exercise plan for you.      © Copyright [employer] 2018 Information is for End User's use only and may not be sold, redistributed or otherwise used for commercial purposes. All illustrations and images included in CareNotes® are the copyrighted property of [employer] or [employer]

## 2024-02-08 ENCOUNTER — OFFICE VISIT (OUTPATIENT)
Dept: URGENT CARE | Facility: CLINIC | Age: 74
End: 2024-02-08
Payer: MEDICARE

## 2024-02-08 ENCOUNTER — OFFICE VISIT (OUTPATIENT)
Dept: OBGYN CLINIC | Facility: CLINIC | Age: 74
End: 2024-02-08
Payer: MEDICARE

## 2024-02-08 ENCOUNTER — OFFICE VISIT (OUTPATIENT)
Dept: OCCUPATIONAL THERAPY | Facility: CLINIC | Age: 74
End: 2024-02-08
Payer: MEDICARE

## 2024-02-08 ENCOUNTER — APPOINTMENT (OUTPATIENT)
Dept: RADIOLOGY | Facility: CLINIC | Age: 74
End: 2024-02-08
Payer: MEDICARE

## 2024-02-08 VITALS
SYSTOLIC BLOOD PRESSURE: 112 MMHG | DIASTOLIC BLOOD PRESSURE: 72 MMHG | RESPIRATION RATE: 16 BRPM | HEART RATE: 65 BPM | TEMPERATURE: 96.9 F | OXYGEN SATURATION: 98 %

## 2024-02-08 VITALS
DIASTOLIC BLOOD PRESSURE: 72 MMHG | HEART RATE: 85 BPM | HEIGHT: 60 IN | WEIGHT: 134 LBS | BODY MASS INDEX: 26.31 KG/M2 | SYSTOLIC BLOOD PRESSURE: 138 MMHG

## 2024-02-08 DIAGNOSIS — S52.501A CLOSED FRACTURE OF DISTAL END OF RIGHT RADIUS, UNSPECIFIED FRACTURE MORPHOLOGY, INITIAL ENCOUNTER: Primary | ICD-10-CM

## 2024-02-08 DIAGNOSIS — S49.91XA ARM INJURY, RIGHT, INITIAL ENCOUNTER: ICD-10-CM

## 2024-02-08 DIAGNOSIS — S52.501A CLOSED FRACTURE OF DISTAL END OF RIGHT RADIUS, UNSPECIFIED FRACTURE MORPHOLOGY, INITIAL ENCOUNTER: ICD-10-CM

## 2024-02-08 PROCEDURE — 29125 APPL SHORT ARM SPLINT STATIC: CPT

## 2024-02-08 PROCEDURE — 97760 ORTHOTIC MGMT&TRAING 1ST ENC: CPT | Performed by: OCCUPATIONAL THERAPIST

## 2024-02-08 PROCEDURE — 73110 X-RAY EXAM OF WRIST: CPT

## 2024-02-08 PROCEDURE — 99203 OFFICE O/P NEW LOW 30 MIN: CPT | Performed by: ORTHOPAEDIC SURGERY

## 2024-02-08 PROCEDURE — G0463 HOSPITAL OUTPT CLINIC VISIT: HCPCS

## 2024-02-08 PROCEDURE — 73130 X-RAY EXAM OF HAND: CPT

## 2024-02-08 PROCEDURE — 99213 OFFICE O/P EST LOW 20 MIN: CPT

## 2024-02-08 NOTE — PROGRESS NOTES
[facility]        NAME: Shasha Ken is a 73 y.o. female  : 1950    MRN: [id number]  DATE: 2024  TIME: 11:39 AM    Assessment and Plan   Closed fracture of distal end of right radius, unspecified fracture morphology, initial encounter [S52.501A]  1. Closed fracture of distal end of right radius, unspecified fracture morphology, initial encounter  Ambulatory Referral to Orthopedic Surgery    Orthopedic injury treatment      2. Arm injury, right, initial encounter  XR hand 3+ vw right    XR wrist 3+ vw right    XR forearm 2 vw right            Patient Instructions     Your x-rays were read by the provider. A radiologist will also read the x-rays and you will be notified of any abnormalities.     You can take Tylenol and Motrin for pain relief.    Elevate the arm on a pillow to relieve swelling. Utilize sling.    Loosen ACE wrapping if you notice numbness/tingling in your arm or fingers OR if your fingers change color.    Orthopedics referral placed for follow-up.    Follow-up with your PCP as needed.    Go to the ED for any worsening symptoms.      Chief Complaint     Chief Complaint   Patient presents with    Fall     Pt reports she fell playing pickleball and now has pain in her right hand, wrist, and forearm.          History of Present Illness       73-year-old female presenting with right wrist pain after she fell playing pickleball yesterday afternoon. Reports falling directly onto her right forearm. This morning she noticed her wrist was more swollen, painful, and slightly bruised. She did not hit her head. Not on blood thinners.        Review of Systems   Review of Systems   Constitutional:  Negative for fever.   Respiratory:  Negative for shortness of breath.    Cardiovascular:  Negative for chest pain.   Gastrointestinal:  Negative for abdominal pain.   Musculoskeletal:  Positive for arthralgias (right wrist). Negative for back pain and neck pain.   Skin:  Positive for  color change (bruising, right wrist). Negative for wound.   Neurological:  Negative for headaches.         Current Medications       Current Outpatient Medications:     atorvastatin (LIPITOR) 20 mg tablet, Take 1 tablet (20 mg total) by mouth daily, Disp: 90 tablet, Rfl: 3    clobetasol (TEMOVATE) 0.05 % cream, Apply topically 2 (two) times a day (Patient not taking: Reported on 9/29/2023), Disp: 30 g, Rfl: 0    famotidine (PEPCID) 20 mg tablet, Take 1 tablet (20 mg total) by mouth daily at bedtime, Disp: 90 tablet, Rfl: 3    omeprazole (PriLOSEC) 20 mg delayed release capsule, Take 1 capsule (20 mg total) by mouth daily Take 30-60 minutes before your first meal of the day, Disp: 90 capsule, Rfl: 3    tamsulosin (FLOMAX) 0.4 mg, Take 1 capsule (0.4 mg total) by mouth daily with dinner (Patient not taking: Reported on 11/22/2023), Disp: 10 capsule, Rfl: 0    Current Allergies     Allergies as of 02/08/2024    (No Known Allergies)            The following portions of the patient's history were reviewed and updated as appropriate: allergies, current medications, past family history, past medical history, past social history, past surgical history and problem list.     Past Medical History:   Diagnosis Date    Anxiety     Arthritis     Baker cyst, left     Cholelithiasis     Chronic headaches     GERD (gastroesophageal reflux disease)     Heart murmur     Hyperlipidemia     Menorrhagia     Sinus infection        Past Surgical History:   Procedure Laterality Date    CHOLECYSTECTOMY      IR ASPIRATION BAKERS CYST Left     LAPAROSCOPIC CHOLECYSTECTOMY         Family History   Problem Relation Age of Onset    Breast cancer Mother 52    Lung cancer Mother     Diabetes Mother     Stroke Father     No Known Problems Maternal Grandmother     No Known Problems Maternal Grandfather     No Known Problems Paternal Grandmother     No Known Problems Paternal Grandfather     Prostate cancer Brother     No Known Problems Brother     No  "Known Problems Brother     No Known Problems Brother     No Known Problems Brother     Lymphoma Brother     No Known Problems Paternal Aunt     Substance Abuse Neg Hx     Mental illness Neg Hx     Colon cancer Neg Hx     Colon polyps Neg Hx          Medications have been verified.        Objective   /72   Pulse 65   Temp (!) 96.9 °F (36.1 °C)   Resp 16   LMP  (LMP Unknown)   SpO2 98%        Physical Exam     Physical Exam  Vitals and nursing note reviewed.   Constitutional:       General: She is not in acute distress.  HENT:      Head: Normocephalic and atraumatic.      Mouth/Throat:      Mouth: Mucous membranes are moist.   Cardiovascular:      Rate and Rhythm: Normal rate.   Pulmonary:      Effort: Pulmonary effort is normal.   Musculoskeletal:      Right elbow: Normal.      Right wrist: Swelling and tenderness present. No deformity or snuff box tenderness. Decreased range of motion.      Right hand: Normal. Normal strength. Normal capillary refill. Normal pulse.      Cervical back: Normal range of motion and neck supple.   Skin:     General: Skin is warm and dry.      Capillary Refill: Capillary refill takes less than 2 seconds.   Neurological:      Mental Status: She is alert and oriented to person, place, and time.           Orthopedic injury treatment    Date/Time: 2/8/2024 10:25 AM    Performed by: KATHIE Dunne  Authorized by: KATHIE Dunne    Patient Location:  [facility] Protocol:  Consent: Verbal consent obtained.  Risks and benefits: risks, benefits and alternatives were discussed  Consent given by: patient  Time out: Immediately prior to procedure a \"time out\" was called to verify the correct patient, procedure, equipment, support staff and site/side marked as required.  Timeout called at: 2/8/2024 10:20 AM.  Patient understanding: patient states understanding of the procedure being performed  Radiology Images displayed and confirmed. If images not available, report " reviewed: imaging studies available  Patient identity confirmed: verbally with patient    Injury location:  Wrist  Location details:  Right wrist  Injury type:  Fracture  Fracture type: distal radius    Fracture type: distal radius    Neurovascular status: Neurovascularly intact    Range of motion: reduced    Manipulation performed?: No    Immobilization:  Splint  Splint type:  Sugar tong  Supplies used:  Cotton padding, elastic bandage and Ortho-Glass  Neurovascular status: Neurovascularly intact    Range of motion: unchanged    Patient tolerance:  Patient tolerated the procedure well with no immediate complications

## 2024-02-08 NOTE — PATIENT INSTRUCTIONS
Your x-rays were read by the provider. A radiologist will also read the x-rays and you will be notified of any abnormalities.     You can take Tylenol and Motrin for pain relief.    Elevate the arm on a pillow to relieve swelling. Utilize sling.    Loosen ACE wrapping if you notice numbness/tingling in your arm or fingers OR if your fingers change color.    Orthopedics referral placed for follow-up.    Follow-up with your PCP as needed.    Go to the ED for any worsening symptoms.

## 2024-02-08 NOTE — PROGRESS NOTES
Assessment/Plan:  1. Closed fracture of distal end of right radius, unspecified fracture morphology, initial encounter  Ambulatory Referral to Orthopedic Surgery    Ambulatory Referral to PT/OT Hand Therapy          Subjective history, physical examination performed, diagnostic imaging reviewed at today's visit  Diagnosis was discussed  X-rays were reviewed which demonstrate right distal radius fracture with dorsal comminution. No displacement or dorsal angulation.  Discussed off the shelf splinting versus custom splint to be fabricated by OT.   Treatment options were discussed which included nonoperative treatment in short arm cast versus removable splint.    Risks, benefits, and realistic expectations for treatment options were discussed.    The patient was given an opportunity to ask questions.  Questions were answered to the patient's satisfaction.    Through shared decision making, the patient decided to move forward with custom splinting fabricated by the hand therapist.  A referral was provided to OT for a custom splint which she will have fabricated today.  Discussed it takes appx 6 weeks for a fracture to heal.  She will follow up in 2 weeks for repeat evaluation and repeat x-rays to include 3 views of the wrist out of the splint.          cc: right wrist pain    Subjective:   Shasha Ken is a 73 y.o. female who presents to the office today for evaluation of right wrist pain. The patient states she fell yesterday 2/7/24 while playing pickle ball. She presented to urgent care today where x-rays were taken and she was placed in a splint.       Review of Systems   Constitutional:  Negative for chills and fever.   HENT:  Negative for drooling and sneezing.    Eyes:  Negative for redness.   Respiratory:  Negative for cough and wheezing.    Gastrointestinal:  Negative for nausea and vomiting.   Musculoskeletal:  Negative for arthralgias, gait problem and joint swelling.   Neurological:  Negative for  weakness and numbness.   Psychiatric/Behavioral:  Negative for behavioral problems. The patient is not nervous/anxious.          Past Medical History:   Diagnosis Date    Anxiety     Arthritis     Baker cyst, left     Cholelithiasis     Chronic headaches     GERD (gastroesophageal reflux disease)     Heart murmur     Hyperlipidemia     Menorrhagia     Sinus infection        Past Surgical History:   Procedure Laterality Date    CHOLECYSTECTOMY      IR ASPIRATION BAKERS CYST Left     LAPAROSCOPIC CHOLECYSTECTOMY         Family History   Problem Relation Age of Onset    Breast cancer Mother 52    Lung cancer Mother     Diabetes Mother     Stroke Father     No Known Problems Maternal Grandmother     No Known Problems Maternal Grandfather     No Known Problems Paternal Grandmother     No Known Problems Paternal Grandfather     Prostate cancer Brother     No Known Problems Brother     No Known Problems Brother     No Known Problems Brother     No Known Problems Brother     Lymphoma Brother     No Known Problems Paternal Aunt     Substance Abuse Neg Hx     Mental illness Neg Hx     Colon cancer Neg Hx     Colon polyps Neg Hx        Social History     Occupational History    Not on file   Tobacco Use    Smoking status: Former     Current packs/day: 0.00     Average packs/day: 0.5 packs/day for 20.0 years (10.0 ttl pk-yrs)     Types: Cigarettes     Start date: 1979     Quit date: 1999     Years since quittin.1    Smokeless tobacco: Never   Vaping Use    Vaping status: Never Used   Substance and Sexual Activity    Alcohol use: Yes     Alcohol/week: 1.0 standard drink of alcohol     Types: 1 Cans of beer per week    Drug use: Never    Sexual activity: Not on file         Current Outpatient Medications:     atorvastatin (LIPITOR) 20 mg tablet, Take 1 tablet (20 mg total) by mouth daily, Disp: 90 tablet, Rfl: 3    famotidine (PEPCID) 20 mg tablet, Take 1 tablet (20 mg total) by mouth daily at bedtime, Disp: 90  tablet, Rfl: 3    omeprazole (PriLOSEC) 20 mg delayed release capsule, Take 1 capsule (20 mg total) by mouth daily Take 30-60 minutes before your first meal of the day, Disp: 90 capsule, Rfl: 3    clobetasol (TEMOVATE) 0.05 % cream, Apply topically 2 (two) times a day (Patient not taking: Reported on 9/29/2023), Disp: 30 g, Rfl: 0    tamsulosin (FLOMAX) 0.4 mg, Take 1 capsule (0.4 mg total) by mouth daily with dinner (Patient not taking: Reported on 11/22/2023), Disp: 10 capsule, Rfl: 0    No Known Allergies    Objective:  Vitals:    02/08/24 1334   BP: 138/72   Pulse: 85       The patient was awake, alert, and oriented to person, place, and time.  No acute distress.  Normocephalic.  EOMI.  Mucous membranes moist.  Normal respiratory effort.    Examination of the affected extremity was compared to the unaffected extremity.  Skin was intact.    + swelling.  No deformity.  Hand and fingers were warm and well-perfused.  Capillary refill was brisk.    Right wrist: TTP distal radius    Imaging/Diagnostic Studies:    I reviewed imaging studies dated today which included 3 view right wrist .  These images studies demonstrated right distal radius fracture with dorsal comminution, no displacement or dorsal angulation.         This document was created using speech voice recognition software.   Grammatical errors, random word insertions, pronoun errors, and incomplete sentences are an occasional consequence of this system due to software limitations, ambient noise, and hardware issues.   Any formal questions or concerns about content, text, or information contained within the body of this dictation should be directly addressed to the provider for clarification.    Scribe Attestation      I,:  Amy Silverman MA am acting as a scribe while in the presence of the attending physician.:       I,:  Angelica Matt MD personally performed the services described in this documentation    as scribed in my presence.:

## 2024-02-08 NOTE — PROGRESS NOTES
Orthosis    Diagnosis:   1. Closed fracture of distal end of right radius, unspecified fracture morphology, initial encounter  Ambulatory Referral to PT/OT Hand Therapy        Indication: Fracture    Location: Right  wrist  Supplies: Custom Fit Orthotic and Skin coverage   Orthosis type: Wrist splint  Wearing Schedule:  FT  Describe Position: function     Precautions: Fracture    Patient or Caregiver expresses understanding of wearing Schedule and Precautions? Yes  Patient or Caregiver able to don/doff orthotic independently?Yes    Written orders provided to patient? Yes  Orders Obtained: Written  Orders Obtained from: Dr Matt    Return for evaluation and treatment No

## 2024-02-22 ENCOUNTER — OFFICE VISIT (OUTPATIENT)
Dept: OBGYN CLINIC | Facility: CLINIC | Age: 74
End: 2024-02-22
Payer: MEDICARE

## 2024-02-22 ENCOUNTER — APPOINTMENT (OUTPATIENT)
Dept: RADIOLOGY | Facility: CLINIC | Age: 74
End: 2024-02-22
Payer: MEDICARE

## 2024-02-22 VITALS
HEIGHT: 60 IN | WEIGHT: 134 LBS | BODY MASS INDEX: 26.31 KG/M2 | SYSTOLIC BLOOD PRESSURE: 125 MMHG | HEART RATE: 85 BPM | DIASTOLIC BLOOD PRESSURE: 85 MMHG

## 2024-02-22 DIAGNOSIS — S52.501D CLOSED FRACTURE OF DISTAL END OF RIGHT RADIUS WITH ROUTINE HEALING, UNSPECIFIED FRACTURE MORPHOLOGY, SUBSEQUENT ENCOUNTER: Primary | ICD-10-CM

## 2024-02-22 DIAGNOSIS — S52.501A CLOSED FRACTURE OF DISTAL END OF RIGHT RADIUS, UNSPECIFIED FRACTURE MORPHOLOGY, INITIAL ENCOUNTER: ICD-10-CM

## 2024-02-22 PROCEDURE — 99213 OFFICE O/P EST LOW 20 MIN: CPT | Performed by: ORTHOPAEDIC SURGERY

## 2024-02-22 PROCEDURE — 73110 X-RAY EXAM OF WRIST: CPT

## 2024-02-22 NOTE — PROGRESS NOTES
Assessment/Plan:  1. Closed fracture of distal end of right radius with routine healing, unspecified fracture morphology, subsequent encounter  XR wrist 3+ vw right          X-rays were reviewed in the office today which are unchanged from previous films.  We will continue with non operative treatment.  The patient was given an opportunity to ask questions.  Questions were answered to the patient's satisfaction.  Through shared decision making, the patient decided to move forward with continued splinting.  The patient can remove the splint for hygiene and while at rest.  She was advised no pushing, pulling or lifting with her RUE.  She will follow up in 2 weeks for repeat evaluation and repeat x-rays with articular view.  We will likely begin therapy at that time.           cc: right wrist follow up    Subjective:   Shasha Ken is a 73 y.o. female who presents to the office today for follow up evaluation right distal radius fracture, DOI 2/7/24. The patient states she is doing well. She has been compliant with the custom splint.       Review of Systems   Constitutional:  Negative for chills and fever.   HENT:  Negative for drooling and sneezing.    Eyes:  Negative for redness.   Respiratory:  Negative for cough and wheezing.    Gastrointestinal:  Negative for nausea and vomiting.   Musculoskeletal:  Negative for arthralgias, joint swelling and myalgias.   Neurological:  Negative for weakness and numbness.   Psychiatric/Behavioral:  Negative for behavioral problems. The patient is not nervous/anxious.          Past Medical History:   Diagnosis Date    Anxiety     Arthritis     Baker cyst, left     Cholelithiasis     Chronic headaches     GERD (gastroesophageal reflux disease)     Heart murmur     Hyperlipidemia     Menorrhagia     Sinus infection        Past Surgical History:   Procedure Laterality Date    CHOLECYSTECTOMY      IR ASPIRATION BAKERS CYST Left     LAPAROSCOPIC CHOLECYSTECTOMY         Family  History   Problem Relation Age of Onset    Breast cancer Mother 52    Lung cancer Mother     Diabetes Mother     Stroke Father     No Known Problems Maternal Grandmother     No Known Problems Maternal Grandfather     No Known Problems Paternal Grandmother     No Known Problems Paternal Grandfather     Prostate cancer Brother     No Known Problems Brother     No Known Problems Brother     No Known Problems Brother     No Known Problems Brother     Lymphoma Brother     No Known Problems Paternal Aunt     Substance Abuse Neg Hx     Mental illness Neg Hx     Colon cancer Neg Hx     Colon polyps Neg Hx        Social History     Occupational History    Not on file   Tobacco Use    Smoking status: Former     Current packs/day: 0.00     Average packs/day: 0.5 packs/day for 20.0 years (10.0 ttl pk-yrs)     Types: Cigarettes     Start date: 1979     Quit date: 1999     Years since quittin.2    Smokeless tobacco: Never   Vaping Use    Vaping status: Never Used   Substance and Sexual Activity    Alcohol use: Yes     Alcohol/week: 1.0 standard drink of alcohol     Types: 1 Cans of beer per week    Drug use: Never    Sexual activity: Not on file         Current Outpatient Medications:     atorvastatin (LIPITOR) 20 mg tablet, Take 1 tablet (20 mg total) by mouth daily, Disp: 90 tablet, Rfl: 3    famotidine (PEPCID) 20 mg tablet, Take 1 tablet (20 mg total) by mouth daily at bedtime, Disp: 90 tablet, Rfl: 3    omeprazole (PriLOSEC) 20 mg delayed release capsule, Take 1 capsule (20 mg total) by mouth daily Take 30-60 minutes before your first meal of the day, Disp: 90 capsule, Rfl: 3    clobetasol (TEMOVATE) 0.05 % cream, Apply topically 2 (two) times a day (Patient not taking: Reported on 2023), Disp: 30 g, Rfl: 0    tamsulosin (FLOMAX) 0.4 mg, Take 1 capsule (0.4 mg total) by mouth daily with dinner (Patient not taking: Reported on 2023), Disp: 10 capsule, Rfl: 0    No Known  Allergies    Objective:  Vitals:    02/22/24 0808   BP: 125/85   Pulse: 85       The patient was awake, alert, and oriented to person, place, and time.  No acute distress.  Normocephalic.  EOMI.  Mucous membranes moist.  Normal respiratory effort.    Examination of the affected extremity was compared to the unaffected extremity.  Skin was intact.  Mild swelling and resolving ecchymosis.  No deformity.  Hand and fingers were warm and well-perfused.  Capillary refill was brisk.  Full active range of motion of the elbow, forearm, and fingers.      Right wrist:  Swelling improved  Mild TTP fx site  Full fist     Imaging/Diagnostic Studies:    I reviewed imaging studies dated today which included x-rays 3 view right wrist .  These images studies demonstrated right distal radius fracture which is unchanged from previous films.           This document was created using speech voice recognition software.   Grammatical errors, random word insertions, pronoun errors, and incomplete sentences are an occasional consequence of this system due to software limitations, ambient noise, and hardware issues.   Any formal questions or concerns about content, text, or information contained within the body of this dictation should be directly addressed to the provider for clarification.    Scribe Attestation      I,:  Amy Silverman MA am acting as a scribe while in the presence of the attending physician.:       I,:  Angelica Matt MD personally performed the services described in this documentation    as scribed in my presence.:

## 2024-02-27 ENCOUNTER — HOSPITAL ENCOUNTER (OUTPATIENT)
Dept: MAMMOGRAPHY | Facility: CLINIC | Age: 74
Discharge: HOME | End: 2024-02-27
Payer: MEDICARE

## 2024-02-27 VITALS — BODY MASS INDEX: 24.97 KG/M2 | HEIGHT: 61 IN | WEIGHT: 132.28 LBS

## 2024-02-27 DIAGNOSIS — Z12.31 SCREENING MAMMOGRAM FOR BREAST CANCER: ICD-10-CM

## 2024-02-27 DIAGNOSIS — Z12.39 SCREENING BREAST EXAMINATION: ICD-10-CM

## 2024-02-27 PROCEDURE — 77063 BREAST TOMOSYNTHESIS BI: CPT

## 2024-02-27 PROCEDURE — 77067 SCR MAMMO BI INCL CAD: CPT

## 2024-03-07 ENCOUNTER — APPOINTMENT (OUTPATIENT)
Dept: RADIOLOGY | Facility: CLINIC | Age: 74
End: 2024-03-07
Payer: MEDICARE

## 2024-03-07 ENCOUNTER — OFFICE VISIT (OUTPATIENT)
Dept: OBGYN CLINIC | Facility: CLINIC | Age: 74
End: 2024-03-07
Payer: MEDICARE

## 2024-03-07 VITALS
BODY MASS INDEX: 24.92 KG/M2 | SYSTOLIC BLOOD PRESSURE: 144 MMHG | WEIGHT: 132 LBS | HEIGHT: 61 IN | HEART RATE: 87 BPM | DIASTOLIC BLOOD PRESSURE: 84 MMHG

## 2024-03-07 DIAGNOSIS — S52.501D CLOSED FRACTURE OF DISTAL END OF RIGHT RADIUS WITH ROUTINE HEALING, UNSPECIFIED FRACTURE MORPHOLOGY, SUBSEQUENT ENCOUNTER: ICD-10-CM

## 2024-03-07 DIAGNOSIS — S52.501D CLOSED FRACTURE OF DISTAL END OF RIGHT RADIUS WITH ROUTINE HEALING, UNSPECIFIED FRACTURE MORPHOLOGY, SUBSEQUENT ENCOUNTER: Primary | ICD-10-CM

## 2024-03-07 PROCEDURE — 73110 X-RAY EXAM OF WRIST: CPT

## 2024-03-07 PROCEDURE — 99213 OFFICE O/P EST LOW 20 MIN: CPT | Performed by: ORTHOPAEDIC SURGERY

## 2024-03-07 NOTE — PROGRESS NOTES
Assessment/Plan:  1. Closed fracture of distal end of right radius with routine healing, unspecified fracture morphology, subsequent encounter  XR wrist 3+ vw right          Patient's wrist doing well after 4 weeks of nonoperative treatment.  I recommended moving forward with formal therapy to begin wrist range of motion.  The therapist can work with her on weaning from the splint.  Through shared decision making, the patient decided to move forward with  initiating OT for mobilization of her right wrist and gentle strengthening  She can utilize her custom splint over the next 2 weeks and eventually wean out of this under OT guidance  Follow up after she returns from [place] in 6-8 weeks.  No Xray is needed.  She can return to golf in 2-3 weeks as tolerated but should avoid pickleball.           cc: Follow up right wrist DOI 2/7/24    Subjective:   Shasha Ken is a  73 y.o. female who presents today for recheck of her right wrist.  She is four week out from injury date and using her custom splint.  No complaints today.  She is eager to start OT and begin using her wrist again.        Review of Systems   Constitutional: Negative.    HENT: Negative.     Eyes: Negative.    Respiratory: Negative.     Cardiovascular: Negative.    Gastrointestinal: Negative.    Endocrine: Negative.    Genitourinary: Negative.    Musculoskeletal: Negative.    Skin: Negative.    Allergic/Immunologic: Negative.    Neurological: Negative.    Hematological: Negative.    Psychiatric/Behavioral: Negative.           Past Medical History:   Diagnosis Date    Anxiety     Arthritis     Baker cyst, left     Cholelithiasis     Chronic headaches     GERD (gastroesophageal reflux disease)     Heart murmur     Hyperlipidemia     Menorrhagia     Sinus infection        Past Surgical History:   Procedure Laterality Date    CHOLECYSTECTOMY      IR ASPIRATION BAKERS CYST Left     LAPAROSCOPIC CHOLECYSTECTOMY         Family History   Problem Relation Age  of Onset    Breast cancer Mother 52    Lung cancer Mother     Diabetes Mother     Stroke Father     No Known Problems Maternal Grandmother     No Known Problems Maternal Grandfather     No Known Problems Paternal Grandmother     No Known Problems Paternal Grandfather     Prostate cancer Brother     No Known Problems Brother     No Known Problems Brother     No Known Problems Brother     No Known Problems Brother     Lymphoma Brother     No Known Problems Paternal Aunt     Substance Abuse Neg Hx     Mental illness Neg Hx     Colon cancer Neg Hx     Colon polyps Neg Hx        Social History     Occupational History    Not on file   Tobacco Use    Smoking status: Former     Current packs/day: 0.00     Average packs/day: 0.5 packs/day for 20.0 years (10.0 ttl pk-yrs)     Types: Cigarettes     Start date: 1979     Quit date: 1999     Years since quittin.2    Smokeless tobacco: Never   Vaping Use    Vaping status: Never Used   Substance and Sexual Activity    Alcohol use: Yes     Alcohol/week: 1.0 standard drink of alcohol     Types: 1 Cans of beer per week    Drug use: Never    Sexual activity: Not on file         Current Outpatient Medications:     atorvastatin (LIPITOR) 20 mg tablet, Take 1 tablet (20 mg total) by mouth daily, Disp: 90 tablet, Rfl: 3    famotidine (PEPCID) 20 mg tablet, Take 1 tablet (20 mg total) by mouth daily at bedtime, Disp: 90 tablet, Rfl: 3    omeprazole (PriLOSEC) 20 mg delayed release capsule, Take 1 capsule (20 mg total) by mouth daily Take 30-60 minutes before your first meal of the day, Disp: 90 capsule, Rfl: 3    clobetasol (TEMOVATE) 0.05 % cream, Apply topically 2 (two) times a day (Patient not taking: Reported on 2023), Disp: 30 g, Rfl: 0    tamsulosin (FLOMAX) 0.4 mg, Take 1 capsule (0.4 mg total) by mouth daily with dinner (Patient not taking: Reported on 2023), Disp: 10 capsule, Rfl: 0    No Known Allergies    Objective:  Vitals:    24 1113   BP:  144/84   Pulse: 87       The patient was awake, alert, and oriented to person, place, and time.  No acute distress.  Normocephalic.  EOMI.  Mucous membranes moist.  Normal respiratory effort.    Examination of the affected extremity was compared to the unaffected extremity.  Skin was intact.  Mild swelling at the thumb CMC joint.  No deformity.  Hand and fingers were warm and well-perfused.  Capillary refill was brisk.  No bony tenderness present over the distal radius.  Wrist is stiff.  Full range of motion of the fingers.    Imaging/Diagnostic Studies:    I reviewed imaging studies dated 3/7/2024 which included right wrist and intraarticular view .  These images studies demonstrated increase sclerosis and callus formation consistent with interval healing of this distal radius fracture.  Articular surface is congruent.         This document was created using speech voice recognition software.   Grammatical errors, random word insertions, pronoun errors, and incomplete sentences are an occasional consequence of this system due to software limitations, ambient noise, and hardware issues.   Any formal questions or concerns about content, text, or information contained within the body of this dictation should be directly addressed to the provider for clarification.

## 2024-03-18 ENCOUNTER — EVALUATION (OUTPATIENT)
Dept: OCCUPATIONAL THERAPY | Facility: CLINIC | Age: 74
End: 2024-03-18
Payer: MEDICARE

## 2024-03-18 DIAGNOSIS — S52.501D CLOSED FRACTURE OF DISTAL END OF RIGHT RADIUS WITH ROUTINE HEALING, UNSPECIFIED FRACTURE MORPHOLOGY, SUBSEQUENT ENCOUNTER: ICD-10-CM

## 2024-03-18 PROCEDURE — 97165 OT EVAL LOW COMPLEX 30 MIN: CPT | Performed by: OCCUPATIONAL THERAPIST

## 2024-03-18 PROCEDURE — 97140 MANUAL THERAPY 1/> REGIONS: CPT | Performed by: OCCUPATIONAL THERAPIST

## 2024-03-18 NOTE — PROGRESS NOTES
OT Evaluation     Today's date: 3/18/2024  Patient name: Shasha Ken  : 1950  MRN: [id number]  Referring provider: Deon Fung PA-C  Dx:   Encounter Diagnosis     ICD-10-CM    1. Closed fracture of distal end of right radius with routine healing, unspecified fracture morphology, subsequent encounter  S52.501D Ambulatory Referral to PT/OT Hand Therapy                     Assessment  Assessment details: Pt. Presents today for evaluation of her R wrist s/p distal radius fx.  She has stiffness with her hand and wrist.  Pt. Has limited AROM of the wrist and functional strength. Her goal is to return playing pickelball and golf.  Pt. To benefit from skilled hand therapy to improve motion and functional strength for ADLs.  Impairments: abnormal or restricted ROM, lacks appropriate home exercise program and pain with function  Functional limitations: Pt. has not been playing pickleball/golf, caring for her potbelly pigs.  She is limited with her housework.Understanding of Dx/Px/POC: good   Prognosis: good    Goals  STG( 4 visits)  1 Compliant with HEP/ween splint,  2. R wrist AROM WNLs for ADLs.  LTG( 8 visits or discharge)  1. R /pinch strength WNLS for sports  2. Pain free R hand use with ADLs.  3. Imrpove FOTO score to predicted outcome or greater.    Plan  Patient would benefit from: skilled occupational therapy  Planned modality interventions: cryotherapy and thermotherapy: hydrocollator packs  Planned therapy interventions: joint mobilization, manual therapy, IASTM, therapeutic exercise, therapeutic activities, graded exercise, graded activity, functional ROM exercises, fine motor coordination training, strengthening and home exercise program  Frequency: 2x week  Duration in visits: 2  Duration in weeks: 6  Plan of Care beginning date: 3/18/2024  Plan of Care expiration date: 2024  Treatment plan discussed with: patient        Subjective Evaluation    History of Present Illness  Mechanism  of injury: Pt. Fell playing pickleball on 24.  She was casted for about 4 weeks and then transitioned to a custom volar wrist splint.  Pt. Is referred to hand therapy for evaluation and treatment.  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     Neurological Testing     Sensation     Wrist/Hand     Right   Intact: light touch    Active Range of Motion     Right Elbow   Forearm supination: 90 degrees   Forearm pronation: 90 degrees     Right Wrist   Wrist flexion: 15 degrees   Wrist extension: 45 degrees   Radial deviation: 10 degrees   Ulnar deviation: 30 degrees     Right Thumb   Opposition: Full opposition    Additional Active Range of Motion Details  Full composite fist  + extrinsic flexor tightness    Strength/Myotome Testing     Left Wrist/Hand      (2nd hand position)     Trial 1: 10    Thumb Strength  Key/Lateral Pinch     Trial 1: 6  Tip/Two-Point Pinch     Trial 1: 4    Right Wrist/Hand      (2nd hand position)     Trial 1: 0    Thumb Strength   Key/Lateral Pinch     Trial 1: 2  Tip/Two-Point Pinch     Trial 1: 8    Swelling     Left Wrist/Hand   Circumference MCP: 16 cm    Right Wrist/Hand   Circumference MCP: 17 cm             Precautions: Fx 24      Manuals 3/18             IE 30'            retrograde 3m            Graston R 4m            Intrinsic/extrinsic stretches 4m            Neuro Re-Ed                          HEP- wrist AROM, strengthening reviewed                                                                              Ther Ex             Wrist PROM 2m            Wrist AROM #1 3x10                                                                                          Ther Activity                                       Gait Training                                       Modalities             MH R 8m

## 2024-03-19 ENCOUNTER — OFFICE VISIT (OUTPATIENT)
Dept: FAMILY MEDICINE CLINIC | Facility: CLINIC | Age: 74
End: 2024-03-19
Payer: MEDICARE

## 2024-03-19 VITALS
DIASTOLIC BLOOD PRESSURE: 78 MMHG | BODY MASS INDEX: 25.3 KG/M2 | OXYGEN SATURATION: 99 % | HEIGHT: 61 IN | WEIGHT: 134 LBS | SYSTOLIC BLOOD PRESSURE: 128 MMHG

## 2024-03-19 DIAGNOSIS — J01.00 SUBACUTE MAXILLARY SINUSITIS: Primary | ICD-10-CM

## 2024-03-19 PROCEDURE — 99214 OFFICE O/P EST MOD 30 MIN: CPT | Performed by: FAMILY MEDICINE

## 2024-03-19 PROCEDURE — G2211 COMPLEX E/M VISIT ADD ON: HCPCS | Performed by: FAMILY MEDICINE

## 2024-03-19 RX ORDER — FLUTICASONE PROPIONATE 50 MCG
1 SPRAY, SUSPENSION (ML) NASAL DAILY
Qty: 18 G | Refills: 2 | Status: SHIPPED | OUTPATIENT
Start: 2024-03-19

## 2024-03-19 RX ORDER — AZITHROMYCIN 250 MG/1
TABLET, FILM COATED ORAL
Qty: 6 TABLET | Refills: 0 | Status: SHIPPED | OUTPATIENT
Start: 2024-03-19 | End: 2024-03-23

## 2024-03-19 RX ORDER — PREDNISONE 20 MG/1
TABLET ORAL
Qty: 15 TABLET | Refills: 0 | Status: SHIPPED | OUTPATIENT
Start: 2024-03-19

## 2024-03-19 NOTE — PROGRESS NOTES
"Assessment/Plan:    No problem-specific Assessment & Plan notes found for this encounter.       There are no diagnoses linked to this encounter.      Subjective:   Chief Complaint   Patient presents with    Dizziness        Patient ID: Shasha Ken is a 73 y.o. female.    Dizziness  Pertinent negatives include no abdominal pain, arthralgias, chest pain, congestion, fatigue, fever, myalgias, nausea, numbness, sore throat, vomiting or weakness.       The following portions of the patient's history were reviewed and updated as appropriate: allergies, current medications, past family history, past medical history, past social history, past surgical history and problem list.    Review of Systems   Constitutional:  Negative for fatigue, fever and unexpected weight change.   HENT:  Negative for congestion, sinus pain and sore throat.    Eyes:  Negative for visual disturbance.   Respiratory:  Negative for shortness of breath and wheezing.    Cardiovascular:  Negative for chest pain and palpitations.   Gastrointestinal:  Negative for abdominal pain, nausea and vomiting.   Musculoskeletal: Negative.  Negative for arthralgias and myalgias.   Neurological:  Positive for dizziness. Negative for syncope, weakness and numbness.   Psychiatric/Behavioral: Negative.  Negative for confusion, dysphoric mood and suicidal ideas.          Objective:  Vitals:    03/19/24 0723   BP: 128/78   BP Location: Left arm   Patient Position: Sitting   Cuff Size: Standard   SpO2: 99%   Weight: 60.8 kg (134 lb)   Height: 5' 1\" (1.549 m)      Physical Exam  Constitutional:       Appearance: She is well-developed.   HENT:      Right Ear: Ear canal normal. Tympanic membrane is not injected.      Left Ear: Ear canal normal. Tympanic membrane is not injected.      Nose: Nose normal.   Eyes:      General:         Right eye: No discharge.         Left eye: No discharge.      Conjunctiva/sclera: Conjunctivae normal.      Pupils: Pupils are equal, round, " and reactive to light.   Neck:      Thyroid: No thyromegaly.   Cardiovascular:      Rate and Rhythm: Normal rate and regular rhythm.      Heart sounds: Normal heart sounds. No murmur heard.  Pulmonary:      Effort: Pulmonary effort is normal. No respiratory distress.      Breath sounds: Normal breath sounds. No wheezing.   Abdominal:      General: Bowel sounds are normal. There is no distension.      Palpations: Abdomen is soft.      Tenderness: There is no abdominal tenderness.   Musculoskeletal:         General: Normal range of motion.      Cervical back: Normal range of motion and neck supple.   Lymphadenopathy:      Cervical: No cervical adenopathy.   Skin:     General: Skin is warm and dry.   Neurological:      Mental Status: She is alert and oriented to person, place, and time. She is not disoriented.      Sensory: No sensory deficit.      Motor: No weakness.      Coordination: Coordination normal.      Gait: Gait normal.      Deep Tendon Reflexes: Reflexes are normal and symmetric.   Psychiatric:         Speech: Speech normal.         Behavior: Behavior normal.         Thought Content: Thought content normal.         Judgment: Judgment normal.

## 2024-03-22 ENCOUNTER — OFFICE VISIT (OUTPATIENT)
Dept: OCCUPATIONAL THERAPY | Facility: CLINIC | Age: 74
End: 2024-03-22
Payer: MEDICARE

## 2024-03-22 DIAGNOSIS — S52.501D CLOSED FRACTURE OF DISTAL END OF RIGHT RADIUS WITH ROUTINE HEALING, UNSPECIFIED FRACTURE MORPHOLOGY, SUBSEQUENT ENCOUNTER: Primary | ICD-10-CM

## 2024-03-22 PROCEDURE — 97110 THERAPEUTIC EXERCISES: CPT | Performed by: OCCUPATIONAL THERAPIST

## 2024-03-22 PROCEDURE — 97140 MANUAL THERAPY 1/> REGIONS: CPT | Performed by: OCCUPATIONAL THERAPIST

## 2024-03-22 NOTE — PROGRESS NOTES
Daily Note     Today's date: 3/22/2024  Patient name: Shasha Ken  : 1950  MRN: [id number]  Referring provider: Deon Fung PA-C  Dx:   Encounter Diagnosis     ICD-10-CM    1. Closed fracture of distal end of right radius with routine healing, unspecified fracture morphology, subsequent encounter  S52.501D                      Subjective: I was getting N/T last night.      Objective: See treatment diary below      Assessment: Tolerated treatment well. Patient  making good progress with her AROM of the wrist.  She has complaint of N/T symptoms at night.        Plan: Continue per plan of care.      Precautions: Fx 24      Manuals 3/18 3/22            IE 30'            retrograde 3m 3m           Graston R 4m 4m           Intrinsic/extrinsic stretches 4m 4m           Neuro Re-Ed                          HEP- wrist AROM, strengthening reviewed                                                                             Ther Ex             Wrist PROM 2m 2m           Wrist AROM #1 3x10 #1 3x10           P/S AROM  SM 3x10           gripping  RPW 2x30                                                               Ther Activity                                       Gait Training                                       Modalities             MH R 8m 8m

## 2024-03-25 ENCOUNTER — OFFICE VISIT (OUTPATIENT)
Dept: OCCUPATIONAL THERAPY | Facility: CLINIC | Age: 74
End: 2024-03-25
Payer: MEDICARE

## 2024-03-25 DIAGNOSIS — S52.501D CLOSED FRACTURE OF DISTAL END OF RIGHT RADIUS WITH ROUTINE HEALING, UNSPECIFIED FRACTURE MORPHOLOGY, SUBSEQUENT ENCOUNTER: Primary | ICD-10-CM

## 2024-03-25 PROCEDURE — 97110 THERAPEUTIC EXERCISES: CPT | Performed by: OCCUPATIONAL THERAPIST

## 2024-03-25 PROCEDURE — 97140 MANUAL THERAPY 1/> REGIONS: CPT | Performed by: OCCUPATIONAL THERAPIST

## 2024-03-25 NOTE — PROGRESS NOTES
Daily Note     Today's date: 3/25/2024  Patient name: Shasha Ken  : 1950  MRN: [id number]  Referring provider: Deon Fung PA-C  Dx:   Encounter Diagnosis     ICD-10-CM    1. Closed fracture of distal end of right radius with routine healing, unspecified fracture morphology, subsequent encounter  S52.501D                      Subjective: I am feeling better.      Objective: See treatment diary below      Assessment: Tolerated treatment well. Patient  has good function.  She is progressing well with functional strength.      Plan: Continue per plan of care.      Precautions: Fx 24      Manuals 3/18 3/22 3/25           IE 30'            retrograde 3m 3m 3m          Graston R 4m 4m 4m          Intrinsic/extrinsic stretches 4m 4m 4m          Neuro Re-Ed                          HEP- wrist AROM, strengthening reviewed                                                                             Ther Ex             Wrist PROM 2m 2m 2m          Wrist AROM #1 3x10 #1 3x10 #3 3x10          P/S AROM  SM 3x10           gripping  RPW 2x30 GPW 2x30          P/S - twists therabar   R 3x10          Fx'l ball lifts   Y 3x10                                    Ther Activity                                       Gait Training                                       Modalities             MH R 8m 8m 8m

## 2024-03-27 ENCOUNTER — OFFICE VISIT (OUTPATIENT)
Dept: OCCUPATIONAL THERAPY | Facility: CLINIC | Age: 74
End: 2024-03-27
Payer: MEDICARE

## 2024-03-27 DIAGNOSIS — S52.501D CLOSED FRACTURE OF DISTAL END OF RIGHT RADIUS WITH ROUTINE HEALING, UNSPECIFIED FRACTURE MORPHOLOGY, SUBSEQUENT ENCOUNTER: Primary | ICD-10-CM

## 2024-03-27 PROCEDURE — 97140 MANUAL THERAPY 1/> REGIONS: CPT | Performed by: OCCUPATIONAL THERAPIST

## 2024-03-27 PROCEDURE — 97110 THERAPEUTIC EXERCISES: CPT | Performed by: OCCUPATIONAL THERAPIST

## 2024-03-27 NOTE — PROGRESS NOTES
Daily Note     Today's date: 3/27/2024  Patient name: Shasha Ken  : 1950  MRN: [id number]  Referring provider: Deon Fung PA-C  Dx:   Encounter Diagnosis     ICD-10-CM    1. Closed fracture of distal end of right radius with routine healing, unspecified fracture morphology, subsequent encounter  S52.501D                      Subjective: It's going      Objective: See treatment diary below      Assessment: Tolerated treatment well. Patient  has good function.  She is progressing well with functional strength.  Pt reporting 3# dumbbell last visit was too much- inc soreness after tx, therapist downgraded to 2# to dec soreness/pain.   WE: 0- 50  WF: 0- 30    Plan: Continue per plan of care.      Precautions: Fx 24      Manuals 3/18 3/22 3/25 3/27          IE 30'            retrograde 3m 3m 3m 3m         Graston R 4m 4m 4m 4m         Intrinsic/extrinsic stretches 4m 4m 4m 4m         Neuro Re-Ed                          HEP- wrist AROM, strengthening reviewed                                                                             Ther Ex             Wrist PROM 2m 2m 2m 2m         Wrist AROM #1 3x10 #1 3x10 #3 3x10 #2 3x10; wrist maze 5x         P/S AROM  SM 3x10  3x10         gripping  RPW 2x30 GPW 2x30 GPW 2x30; yellow flexgrip 30x         P/S - twists therabar   R 3x10 R 3x10         Fx'l ball lifts   Y 3x10 Y 3x10                                   Ther Activity                                       Gait Training                                       Modalities             MH R 8m 8m 8m 5m

## 2024-04-01 ENCOUNTER — OFFICE VISIT (OUTPATIENT)
Dept: OCCUPATIONAL THERAPY | Facility: CLINIC | Age: 74
End: 2024-04-01
Payer: MEDICARE

## 2024-04-01 DIAGNOSIS — S52.501D CLOSED FRACTURE OF DISTAL END OF RIGHT RADIUS WITH ROUTINE HEALING, UNSPECIFIED FRACTURE MORPHOLOGY, SUBSEQUENT ENCOUNTER: Primary | ICD-10-CM

## 2024-04-01 PROCEDURE — 97140 MANUAL THERAPY 1/> REGIONS: CPT | Performed by: OCCUPATIONAL THERAPIST

## 2024-04-01 PROCEDURE — 97110 THERAPEUTIC EXERCISES: CPT | Performed by: OCCUPATIONAL THERAPIST

## 2024-04-01 NOTE — PROGRESS NOTES
Daily Note     Today's date: 2024  Patient name: Shasha Ken  : 1950  MRN: [id number]  Referring provider: Deon Fung PA-C  Dx:   Encounter Diagnosis     ICD-10-CM    1. Closed fracture of distal end of right radius with routine healing, unspecified fracture morphology, subsequent encounter  S52.501D                      Subjective: No new complaints      Objective: See treatment diary below      Assessment: Tolerated treatment well. Patient  has good function.  She is progressing well with functional strength.  NO complaints with tx.    WE: 0- 50  WF: 0- 30    Plan: Continue per plan of care.      Precautions: Fx 24      Manuals 3/18 3/22 3/25 3/27 4/1         IE 30'            retrograde 3m 3m 3m 3m 3m        Graston R 4m 4m 4m 4m 4m        Intrinsic/extrinsic stretches 4m 4m 4m 4m 4m        Neuro Re-Ed                          HEP- wrist AROM, strengthening reviewed                                                                             Ther Ex             Wrist PROM 2m 2m 2m 2m 2m        Wrist AROM #1 3x10 #1 3x10 #3 3x10 #2 3x10; wrist maze 5x #2 3x10        P/S AROM  SM 3x10  3x10 SM 3x10        gripping  RPW 2x30 GPW 2x30 GPW 2x30; yellow flexgrip 30x GPW 2x30        P/S - twists therabar   R 3x10 R 3x10 R 3x10        Fx'l ball lifts   Y 3x10 Y 3x10 R 3x10                                  Ther Activity                                       Gait Training                                       Modalities             MH R 8m 8m 8m 5m 5m

## 2024-04-03 ENCOUNTER — OFFICE VISIT (OUTPATIENT)
Dept: OCCUPATIONAL THERAPY | Facility: CLINIC | Age: 74
End: 2024-04-03
Payer: MEDICARE

## 2024-04-03 DIAGNOSIS — S52.501D CLOSED FRACTURE OF DISTAL END OF RIGHT RADIUS WITH ROUTINE HEALING, UNSPECIFIED FRACTURE MORPHOLOGY, SUBSEQUENT ENCOUNTER: Primary | ICD-10-CM

## 2024-04-03 PROCEDURE — 97140 MANUAL THERAPY 1/> REGIONS: CPT | Performed by: OCCUPATIONAL THERAPIST

## 2024-04-03 PROCEDURE — 97110 THERAPEUTIC EXERCISES: CPT | Performed by: OCCUPATIONAL THERAPIST

## 2024-04-03 NOTE — PROGRESS NOTES
Daily Note     Today's date: 4/3/2024  Patient name: Shasha Ken  : 1950  MRN: [id number]  Referring provider: Deon Fung PA-C  Dx:   Encounter Diagnosis     ICD-10-CM    1. Closed fracture of distal end of right radius with routine healing, unspecified fracture morphology, subsequent encounter  S52.501D                      Subjective: I feel much better.      Objective: See treatment diary below      Assessment: Tolerated treatment well. Patient has improved wrist flexion.  She is progressing well with her functional strength.  WE: 0- 50  WF: 0- 40    Plan: Continue per plan of care.      Precautions: Fx 24      Manuals 3/18 3/22 3/25 3/27 4/1 4/3        IE 30'            retrograde 3m 3m 3m 3m 3m 3m       Graston R 4m 4m 4m 4m 4m 4m       Intrinsic/extrinsic stretches 4m 4m 4m 4m 4m 4m       Neuro Re-Ed                          HEP- wrist AROM, strengthening reviewed                                                                             Ther Ex             Wrist PROM 2m 2m 2m 2m 2m 2m       Wrist AROM #1 3x10 #1 3x10 #3 3x10 #2 3x10; wrist maze 5x #2 3x10 #3 3x10       P/S AROM  SM 3x10  3x10 SM 3x10        gripping  RPW 2x30 GPW 2x30 GPW 2x30; yellow flexgrip 30x GPW 2x30 BPW 2x30       P/S - twists therabar   R 3x10 R 3x10 R 3x10 R 3x10       Fx'l ball lifts   Y 3x10 Y 3x10 R 3x10 R 3x10       Velcro board flex/ext      x6                    Ther Activity                                       Gait Training                                       Modalities             MH R 8m 8m 8m 5m 5m 5m

## 2024-04-08 ENCOUNTER — OFFICE VISIT (OUTPATIENT)
Dept: OCCUPATIONAL THERAPY | Facility: CLINIC | Age: 74
End: 2024-04-08
Payer: MEDICARE

## 2024-04-08 DIAGNOSIS — S52.501D CLOSED FRACTURE OF DISTAL END OF RIGHT RADIUS WITH ROUTINE HEALING, UNSPECIFIED FRACTURE MORPHOLOGY, SUBSEQUENT ENCOUNTER: Primary | ICD-10-CM

## 2024-04-08 PROCEDURE — 97110 THERAPEUTIC EXERCISES: CPT | Performed by: OCCUPATIONAL THERAPIST

## 2024-04-08 PROCEDURE — 97140 MANUAL THERAPY 1/> REGIONS: CPT | Performed by: OCCUPATIONAL THERAPIST

## 2024-04-08 NOTE — PROGRESS NOTES
Daily Note     Today's date: 2024  Patient name: Shasha Ken  : 1950  MRN: [id number]  Referring provider: Deon Fung PA-C  Dx:   Encounter Diagnosis     ICD-10-CM    1. Closed fracture of distal end of right radius with routine healing, unspecified fracture morphology, subsequent encounter  S52.501D                      Subjective: I am going to try to golf this week.      Objective: See treatment diary below      Assessment: Tolerated treatment well. Patient has slowly returned to full function.  She is happy with her progress. Mild complaint of N/T symptoms at night only, will continue to monitor.  Plan to discharge next session as pt. Is leaving for [place].   WE: 0- 50  WF: 0- 40    Plan: Continue per plan of care.      Precautions: Fx 24      Manuals 3/18 3/22 3/25 3/27 4/1 4/3 4/8       IE 30'            retrograde 3m 3m 3m 3m 3m 3m 3m      Graston R 4m 4m 4m 4m 4m 4m 4m      Intrinsic/extrinsic stretches 4m 4m 4m 4m 4m 4m 4m      Neuro Re-Ed                          HEP- wrist AROM, strengthening reviewed                                                                             Ther Ex             Wrist PROM 2m 2m 2m 2m 2m 2m 2m      Wrist AROM #1 3x10 #1 3x10 #3 3x10 #2 3x10; wrist maze 5x #2 3x10 #3 3x10 #3 3x10      P/S AROM  SM 3x10  3x10 SM 3x10        gripping  RPW 2x30 GPW 2x30 GPW 2x30; yellow flexgrip 30x GPW 2x30 BPW 2x30 BPW 2x30      P/S - twists therabar   R 3x10 R 3x10 R 3x10 R 3x10 G 3x10      Fx'l ball lifts   Y 3x10 Y 3x10 R 3x10 R 3x10 G 3x10      Velcro board flex/ext      x6 x6                   Ther Activity                                       Gait Training                                       Modalities             MH R 8m 8m 8m 5m 5m 5m 5m

## 2024-04-10 ENCOUNTER — OFFICE VISIT (OUTPATIENT)
Dept: OCCUPATIONAL THERAPY | Facility: CLINIC | Age: 74
End: 2024-04-10
Payer: MEDICARE

## 2024-04-10 DIAGNOSIS — S52.501D CLOSED FRACTURE OF DISTAL END OF RIGHT RADIUS WITH ROUTINE HEALING, UNSPECIFIED FRACTURE MORPHOLOGY, SUBSEQUENT ENCOUNTER: Primary | ICD-10-CM

## 2024-04-10 PROCEDURE — 97110 THERAPEUTIC EXERCISES: CPT | Performed by: OCCUPATIONAL THERAPIST

## 2024-04-10 PROCEDURE — 97140 MANUAL THERAPY 1/> REGIONS: CPT | Performed by: OCCUPATIONAL THERAPIST

## 2024-04-10 NOTE — PROGRESS NOTES
Daily Note     Today's date: 4/10/2024  Patient name: Shasha Ken  : 1950  MRN: [id number]  Referring provider: Deon Fung PA-C  Dx:   Encounter Diagnosis     ICD-10-CM    1. Closed fracture of distal end of right radius with routine healing, unspecified fracture morphology, subsequent encounter  S52.501D                      Subjective: I can do a lot with it, but I still get some pain.      Objective: See treatment diary below      Assessment: Tolerated treatment well. Patient has slowly returned to full function.  She is happy with her progress. She continues with some strength limitations.  Her pain is very mild.  FOTO score has been met.  She will continue with strengthening HEP independently and return to [place] ball and golf as well.  Pt. Going to [place], OT to be discontinued at this time.  WE: 0- 60  WF: 0- 48    Goals  STG( 4 visits)  1 Compliant with HEP/ween splint,- met  2. R wrist AROM WNLs for ADLs.- met  LTG( 8 visits or discharge)  1. R /pinch strength WNLS for sports- met  2. Pain free R hand use with ADLs.- met  3. Imrpove FOTO score to predicted outcome or greater.- met      Plan: D/C from OT.     Precautions: Fx 24      Manuals 3/18 3/22 3/25 3/27 4/1 4/3 4/8 4/10      IE 30'            retrograde 3m 3m 3m 3m 3m 3m 3m 3m     Graston R 4m 4m 4m 4m 4m 4m 4m 4m     Intrinsic/extrinsic stretches 4m 4m 4m 4m 4m 4m 4m 4m     Neuro Re-Ed                          HEP- wrist AROM, strengthening reviewed                                                                             Ther Ex             Wrist PROM 2m 2m 2m 2m 2m 2m 2m 2m     Wrist AROM #1 3x10 #1 3x10 #3 3x10 #2 3x10; wrist maze 5x #2 3x10 #3 3x10 #3 3x10 #3 3x10     P/S AROM  SM 3x10  3x10 SM 3x10        gripping  RPW 2x30 GPW 2x30 GPW 2x30; yellow flexgrip 30x GPW 2x30 BPW 2x30 BPW 2x30 BPW 2x30     P/S - twists therabar   R 3x10 R 3x10 R 3x10 R 3x10 G 3x10 G 3x10     Fx'l ball lifts   Y 3x10 Y 3x10 R 3x10 R  3x10 G 3x10 G 3x10     Velcro board flex/ext      x6 x6 x6                  Ther Activity                                       Gait Training                                       Modalities             MH R 8m 8m 8m 5m 5m 5m 5m 5m

## 2024-05-15 ENCOUNTER — OFFICE VISIT (OUTPATIENT)
Dept: OBGYN CLINIC | Facility: CLINIC | Age: 74
End: 2024-05-15
Payer: MEDICARE

## 2024-05-15 VITALS
SYSTOLIC BLOOD PRESSURE: 153 MMHG | WEIGHT: 132 LBS | BODY MASS INDEX: 24.92 KG/M2 | DIASTOLIC BLOOD PRESSURE: 73 MMHG | HEIGHT: 61 IN | HEART RATE: 64 BPM

## 2024-05-15 DIAGNOSIS — S52.501D CLOSED FRACTURE OF DISTAL END OF RIGHT RADIUS WITH ROUTINE HEALING, UNSPECIFIED FRACTURE MORPHOLOGY, SUBSEQUENT ENCOUNTER: Primary | ICD-10-CM

## 2024-05-15 DIAGNOSIS — G56.01 CARPAL TUNNEL SYNDROME ON RIGHT: ICD-10-CM

## 2024-05-15 PROCEDURE — 99213 OFFICE O/P EST LOW 20 MIN: CPT | Performed by: ORTHOPAEDIC SURGERY

## 2024-05-15 PROCEDURE — 20526 THER INJECTION CARP TUNNEL: CPT | Performed by: ORTHOPAEDIC SURGERY

## 2024-05-15 RX ORDER — LIDOCAINE HYDROCHLORIDE 10 MG/ML
1 INJECTION, SOLUTION INFILTRATION; PERINEURAL
Status: COMPLETED | OUTPATIENT
Start: 2024-05-15 | End: 2024-05-15

## 2024-05-15 RX ORDER — BETAMETHASONE SODIUM PHOSPHATE AND BETAMETHASONE ACETATE 3; 3 MG/ML; MG/ML
6 INJECTION, SUSPENSION INTRA-ARTICULAR; INTRALESIONAL; INTRAMUSCULAR; SOFT TISSUE
Status: COMPLETED | OUTPATIENT
Start: 2024-05-15 | End: 2024-05-15

## 2024-05-15 RX ADMIN — LIDOCAINE HYDROCHLORIDE 1 ML: 10 INJECTION, SOLUTION INFILTRATION; PERINEURAL at 08:15

## 2024-05-15 RX ADMIN — BETAMETHASONE SODIUM PHOSPHATE AND BETAMETHASONE ACETATE 6 MG: 3; 3 INJECTION, SUSPENSION INTRA-ARTICULAR; INTRALESIONAL; INTRAMUSCULAR; SOFT TISSUE at 08:15

## 2024-05-15 NOTE — PROGRESS NOTES
Assessment/Plan:  1. Closed fracture of distal end of right radius with routine healing, unspecified fracture morphology, subsequent encounter        2. Carpal tunnel syndrome on right  Hand/upper extremity injection: R carpal tunnel          Discussed with the patient that her signs and symptoms are consistent with right carpal tunnel syndrome.  I explained, she likely had this underlying for a while due to thenar atrophy on exam.  Treatment options were discussed in the form of splinting  The patient was given an opportunity to ask questions.  Questions were answered to the patient's satisfaction.    Through shared decision making, the patient decided to move forward with bracing and a steroid injection.  The patient consented and underwent a right carpal tunnel injection in the office today without any complications.  She was instructed to return to using the cock-up wrist brace at night.  She will follow up in 8 weeks for repeat evaluation.          Hand/upper extremity injection: R carpal tunnel  [place] Protocol:  Consent: Verbal consent obtained.  Consent given by: patient  Patient identity confirmed: verbally with patient  Supporting Documentation  Indications: pain   Procedure Details  Condition:carpal tunnel syndrome Site: R carpal tunnel   Preparation: Patient was prepped and draped in the usual sterile fashion  Needle size: 25 G  Ultrasound guidance: no  Medications administered: 1 mL lidocaine 1 %; 6 mg betamethasone acetate-betamethasone sodium phosphate 6 (3-3) mg/mL  Patient tolerance: patient tolerated the procedure well with no immediate complications  Dressing:  Sterile dressing applied                   cc: right wrist follow up    Subjective:   Shasha Ken is a 73 y.o. female who presents to the office today for follow up evaluation s/p closed treatment for a right distal radius fracture, DOI 2/7/24. The patient states she is doing well. She returned to [employer] a few weeks ago. She  completed therapy appx one month ago. She notes numbness and tingling in her hand. She states this is worse at night. She has been using heat and Aleve.      Review of Systems   Constitutional:  Negative for chills and fever.   HENT:  Negative for drooling and sneezing.    Eyes:  Negative for redness.   Respiratory:  Negative for cough and wheezing.    Gastrointestinal:  Negative for nausea and vomiting.   Musculoskeletal:  Negative for arthralgias, joint swelling and myalgias.   Neurological:  Negative for weakness and numbness.   Psychiatric/Behavioral:  Negative for behavioral problems. The patient is not nervous/anxious.          Past Medical History:   Diagnosis Date    Anxiety     Arthritis     Baker cyst, left     Cholelithiasis     Chronic headaches     GERD (gastroesophageal reflux disease)     Heart murmur     Hyperlipidemia     Menorrhagia     Sinus infection        Past Surgical History:   Procedure Laterality Date    CHOLECYSTECTOMY      IR ASPIRATION BAKERS CYST Left     LAPAROSCOPIC CHOLECYSTECTOMY         Family History   Problem Relation Age of Onset    Breast cancer Mother 52    Lung cancer Mother     Diabetes Mother     Stroke Father     No Known Problems Maternal Grandmother     No Known Problems Maternal Grandfather     No Known Problems Paternal Grandmother     No Known Problems Paternal Grandfather     Prostate cancer Brother     No Known Problems Brother     No Known Problems Brother     No Known Problems Brother     No Known Problems Brother     Lymphoma Brother     No Known Problems Paternal Aunt     Substance Abuse Neg Hx     Mental illness Neg Hx     Colon cancer Neg Hx     Colon polyps Neg Hx        Social History     Occupational History    Not on file   Tobacco Use    Smoking status: Former     Current packs/day: 0.00     Average packs/day: 0.5 packs/day for 20.0 years (10.0 ttl pk-yrs)     Types: Cigarettes     Start date: 12/12/1979     Quit date: 12/12/1999     Years since quitting:  24.4    Smokeless tobacco: Never   Vaping Use    Vaping status: Never Used   Substance and Sexual Activity    Alcohol use: Yes     Alcohol/week: 1.0 standard drink of alcohol     Types: 1 Cans of beer per week    Drug use: Never    Sexual activity: Not on file         Current Outpatient Medications:     atorvastatin (LIPITOR) 20 mg tablet, Take 1 tablet (20 mg total) by mouth daily, Disp: 90 tablet, Rfl: 3    famotidine (PEPCID) 20 mg tablet, Take 1 tablet (20 mg total) by mouth daily at bedtime, Disp: 90 tablet, Rfl: 3    fluticasone (FLONASE) 50 mcg/act nasal spray, 1 spray into each nostril daily, Disp: 18 g, Rfl: 2    omeprazole (PriLOSEC) 20 mg delayed release capsule, Take 1 capsule (20 mg total) by mouth daily Take 30-60 minutes before your first meal of the day, Disp: 90 capsule, Rfl: 3    clobetasol (TEMOVATE) 0.05 % cream, Apply topically 2 (two) times a day (Patient not taking: Reported on 9/29/2023), Disp: 30 g, Rfl: 0    predniSONE 20 mg tablet, TAKE 1 TABLET BID X 5 DAYS THEN TAKE 1 TABLET QD FOR 5 DAYS, Disp: 15 tablet, Rfl: 0    tamsulosin (FLOMAX) 0.4 mg, Take 1 capsule (0.4 mg total) by mouth daily with dinner (Patient not taking: Reported on 11/22/2023), Disp: 10 capsule, Rfl: 0    No Known Allergies    Objective:  Vitals:    05/15/24 0809   BP: 153/73   Pulse: 64       The patient was awake, alert, and oriented to person, place, and time.  No acute distress.  Normocephalic.  EOMI.  Mucous membranes moist.  Normal respiratory effort.    Examination of the affected extremity was compared to the unaffected extremity.  Skin was intact.  No swelling or ecchymosis.  No deformity.  Hand and fingers were warm and well-perfused.  Capillary refill was brisk.  Full active range of motion of the elbows, forearms, wrists, and fingers.  5/5 wrist flexor/extensors and .     Right wrist:  Full composite fist  + tinel's at the wrist  Thenar atrophy bilaterally  + carpal tunnel  compression    Imaging/Diagnostic Studies:    No new images reviewed in the office today        This document was created using speech voice recognition software.   Grammatical errors, random word insertions, pronoun errors, and incomplete sentences are an occasional consequence of this system due to software limitations, ambient noise, and hardware issues.   Any formal questions or concerns about content, text, or information contained within the body of this dictation should be directly addressed to the provider for clarification.    Scribe Attestation      I,:  Amy Silverman MA am acting as a scribe while in the presence of the attending physician.:       I,:  Angelica Matt MD personally performed the services described in this documentation    as scribed in my presence.:

## 2024-06-17 ENCOUNTER — OFFICE VISIT (OUTPATIENT)
Dept: FAMILY MEDICINE CLINIC | Facility: CLINIC | Age: 74
End: 2024-06-17
Payer: MEDICARE

## 2024-06-17 ENCOUNTER — HOSPITAL ENCOUNTER (OUTPATIENT)
Dept: RADIOLOGY | Facility: HOSPITAL | Age: 74
Discharge: HOME | End: 2024-06-17
Payer: MEDICARE

## 2024-06-17 VITALS
HEIGHT: 61 IN | OXYGEN SATURATION: 97 % | RESPIRATION RATE: 18 BRPM | WEIGHT: 134 LBS | HEART RATE: 91 BPM | TEMPERATURE: 98.3 F | DIASTOLIC BLOOD PRESSURE: 88 MMHG | SYSTOLIC BLOOD PRESSURE: 138 MMHG | BODY MASS INDEX: 25.3 KG/M2

## 2024-06-17 DIAGNOSIS — M54.6 THORACIC SPINE PAIN: ICD-10-CM

## 2024-06-17 DIAGNOSIS — M54.6 THORACIC SPINE PAIN: Primary | ICD-10-CM

## 2024-06-17 DIAGNOSIS — R07.81 RIB PAIN ON LEFT SIDE: ICD-10-CM

## 2024-06-17 DIAGNOSIS — M94.0 COSTOCHONDRITIS: ICD-10-CM

## 2024-06-17 PROCEDURE — 72070 X-RAY EXAM THORAC SPINE 2VWS: CPT

## 2024-06-17 PROCEDURE — 71101 X-RAY EXAM UNILAT RIBS/CHEST: CPT

## 2024-06-17 PROCEDURE — 99214 OFFICE O/P EST MOD 30 MIN: CPT | Performed by: FAMILY MEDICINE

## 2024-06-17 PROCEDURE — G2211 COMPLEX E/M VISIT ADD ON: HCPCS | Performed by: FAMILY MEDICINE

## 2024-06-17 NOTE — PROGRESS NOTES
"Assessment/Plan:    No problem-specific Assessment & Plan notes found for this encounter.       Diagnoses and all orders for this visit:    Thoracic spine pain  -     XR spine thoracic 2 vw; Future    Rib pain on left side  -     XR ribs 2 vw left; Future    Costochondritis          Subjective:   Chief Complaint   Patient presents with    Pain     In rib pain to back, worse when she sits.         Patient ID: Shasha Ken is a 73 y.o. female.    HPI    The following portions of the patient's history were reviewed and updated as appropriate: allergies, current medications, past family history, past medical history, past social history, past surgical history and problem list.    Review of Systems   Constitutional:  Negative for fatigue, fever and unexpected weight change.   HENT:  Negative for congestion, sinus pain and sore throat.    Eyes:  Negative for visual disturbance.   Respiratory:  Negative for shortness of breath and wheezing.    Cardiovascular:  Negative for chest pain and palpitations.   Gastrointestinal:  Negative for abdominal pain, nausea and vomiting.   Musculoskeletal: Negative.  Negative for arthralgias and myalgias.   Neurological:  Negative for syncope, weakness and numbness.   Psychiatric/Behavioral: Negative.  Negative for confusion, dysphoric mood and suicidal ideas.          Objective:  Vitals:    06/17/24 1532   BP: 138/88   Pulse: 91   Resp: 18   Temp: 98.3 °F (36.8 °C)   TempSrc: Tympanic   SpO2: 97%   Weight: 60.8 kg (134 lb)   Height: 5' 1\" (1.549 m)      Physical Exam  Constitutional:       Appearance: She is well-developed.   HENT:      Right Ear: Ear canal normal. Tympanic membrane is not injected.      Left Ear: Ear canal normal. Tympanic membrane is not injected.      Nose: Nose normal.   Eyes:      General:         Right eye: No discharge.         Left eye: No discharge.      Conjunctiva/sclera: Conjunctivae normal.      Pupils: Pupils are equal, round, and reactive to light. "   Neck:      Thyroid: No thyromegaly.   Cardiovascular:      Rate and Rhythm: Normal rate and regular rhythm.      Heart sounds: Normal heart sounds. No murmur heard.  Pulmonary:      Effort: Pulmonary effort is normal. No respiratory distress.      Breath sounds: Normal breath sounds. No wheezing.   Abdominal:      General: Bowel sounds are normal. There is no distension.      Palpations: Abdomen is soft.      Tenderness: There is no abdominal tenderness.   Musculoskeletal:         General: Normal range of motion.      Cervical back: Normal range of motion and neck supple.   Lymphadenopathy:      Cervical: No cervical adenopathy.   Skin:     General: Skin is warm and dry.   Neurological:      Mental Status: She is alert and oriented to person, place, and time. She is not disoriented.      Sensory: No sensory deficit.      Motor: No weakness.      Coordination: Coordination normal.      Gait: Gait normal.      Deep Tendon Reflexes: Reflexes are normal and symmetric.   Psychiatric:         Speech: Speech normal.         Behavior: Behavior normal.         Thought Content: Thought content normal.         Judgment: Judgment normal.

## 2024-07-12 ENCOUNTER — OFFICE VISIT (OUTPATIENT)
Dept: OBGYN CLINIC | Facility: CLINIC | Age: 74
End: 2024-07-12
Payer: MEDICARE

## 2024-07-12 VITALS
SYSTOLIC BLOOD PRESSURE: 128 MMHG | BODY MASS INDEX: 25.3 KG/M2 | HEIGHT: 61 IN | WEIGHT: 134 LBS | DIASTOLIC BLOOD PRESSURE: 72 MMHG

## 2024-07-12 DIAGNOSIS — S52.501D CLOSED FRACTURE OF DISTAL END OF RIGHT RADIUS WITH ROUTINE HEALING, UNSPECIFIED FRACTURE MORPHOLOGY, SUBSEQUENT ENCOUNTER: ICD-10-CM

## 2024-07-12 DIAGNOSIS — G56.01 CARPAL TUNNEL SYNDROME ON RIGHT: Primary | ICD-10-CM

## 2024-07-12 PROCEDURE — 20526 THER INJECTION CARP TUNNEL: CPT | Performed by: ORTHOPAEDIC SURGERY

## 2024-07-12 PROCEDURE — 99213 OFFICE O/P EST LOW 20 MIN: CPT | Performed by: ORTHOPAEDIC SURGERY

## 2024-07-12 RX ORDER — BETAMETHASONE SODIUM PHOSPHATE AND BETAMETHASONE ACETATE 3; 3 MG/ML; MG/ML
6 INJECTION, SUSPENSION INTRA-ARTICULAR; INTRALESIONAL; INTRAMUSCULAR; SOFT TISSUE
Status: COMPLETED | OUTPATIENT
Start: 2024-07-12 | End: 2024-07-12

## 2024-07-12 RX ORDER — LIDOCAINE HYDROCHLORIDE 10 MG/ML
1 INJECTION, SOLUTION INFILTRATION; PERINEURAL
Status: COMPLETED | OUTPATIENT
Start: 2024-07-12 | End: 2024-07-12

## 2024-07-12 RX ADMIN — LIDOCAINE HYDROCHLORIDE 1 ML: 10 INJECTION, SOLUTION INFILTRATION; PERINEURAL at 07:30

## 2024-07-12 RX ADMIN — BETAMETHASONE SODIUM PHOSPHATE AND BETAMETHASONE ACETATE 6 MG: 3; 3 INJECTION, SUSPENSION INTRA-ARTICULAR; INTRALESIONAL; INTRAMUSCULAR; SOFT TISSUE at 07:30

## 2024-07-12 NOTE — PROGRESS NOTES
Assessment/Plan:  1. Carpal tunnel syndrome on right  Hand/upper extremity injection: R carpal tunnel      2. Closed fracture of distal end of right radius with routine healing, unspecified fracture morphology, subsequent encounter            Discussed with the patient that her signs and symptoms are consistent with ongoing right carpal tunnel syndrome.  Treatment options were discussed in the form of splinting  The patient was given an opportunity to ask questions.  Questions were answered to the patient's satisfaction.    A discussion was had with the patient that she has two options moving forward.  We could either do another carpal tunnel injection or proceed with surgery.    The details of a potential surgical procedure were discussed in-depth with the patient.  We could do this under sedation or local anesthesia.  If she wished to remain awake for the procedure, lidocaine would be injected into the surgical area to numb her wrist.  After the procedure, she would be placed into a splint.  2 weeks after surgery, she will see the hand therapistsfor suture removal and to discuss nerve glide exercises.    If she proceeds with surgery, her wrist could be sore for 6 weeks, but she should feel better after this.  Through shared decision making, the patient decided to move forward with another steroid injection today, and she will think about surgery for the end of summer.  She understands that if she has another injection now, she would be unable to proceed with carpal tunnel release surgery for at least 3 months to decrease the risk for infection.  The patient consented and underwent a right carpal tunnel injection in the office today without any complications.  She was instructed to stretch before playing pickle ball or golfing.  Stretches were reviewed with the patient today.  She will follow up in 8 weeks for repeat evaluation.  She may cancel this appointment if she is feeling good.  Otherwise, we will get her  signed up for surgery.          Hand/upper extremity injection: R carpal tunnel  [place] Protocol:  Consent: Verbal consent obtained.  Consent given by: patient  Patient understanding: patient states understanding of the procedure being performed  Patient consent: the patient's understanding of the procedure matches consent given  Procedure consent: procedure consent matches procedure scheduled  Patient identity confirmed: verbally with patient  Supporting Documentation  Indications: pain   Procedure Details  Condition:carpal tunnel syndrome Site: R carpal tunnel   Needle size: 25 G  Medications administered: 1 mL lidocaine 1 %; 6 mg betamethasone acetate-betamethasone sodium phosphate 6 (3-3) mg/mL  Patient tolerance: patient tolerated the procedure well with no immediate complications  Dressing:  Sterile dressing applied                 cc: right wrist follow up    Subjective:   Shasha Ken is a 74 y.o. right-hand dominant female who presents to the office today for follow up evaluation s/p closed treatment for a right distal radius fracture, DOI 2/7/24.  She was also showing signs and symptoms of right carpal tunnel syndrome, and she underwent an injection for her carpal tunnel syndrome at her last office visit on 5/15/24.  Today she notes ongoing tingling and burning.  It did get slightly better immediately after the injection, but now she feels that it is getting worse.  She is an avid golfer and has been playing pickle ball this summer, and she notices her symptoms are worse when she plays pickle ball.  She does not stretch before doing these activities.      Review of Systems   Constitutional:  Negative for chills and fever.   HENT:  Negative for drooling and sneezing.    Eyes:  Negative for redness.   Respiratory:  Negative for cough and wheezing.    Gastrointestinal:  Negative for nausea and vomiting.   Musculoskeletal:  Negative for arthralgias, joint swelling and myalgias.   Neurological:   Negative for weakness and numbness.   Psychiatric/Behavioral:  Negative for behavioral problems. The patient is not nervous/anxious.          Past Medical History:   Diagnosis Date    Anxiety     Arthritis     Baker cyst, left     Cholelithiasis     Chronic headaches     GERD (gastroesophageal reflux disease)     Heart murmur     Hyperlipidemia     Menorrhagia     Sinus infection        Past Surgical History:   Procedure Laterality Date    CHOLECYSTECTOMY      IR ASPIRATION BAKERS CYST Left     LAPAROSCOPIC CHOLECYSTECTOMY         Family History   Problem Relation Age of Onset    Breast cancer Mother 52    Lung cancer Mother     Diabetes Mother     Stroke Father     No Known Problems Maternal Grandmother     No Known Problems Maternal Grandfather     No Known Problems Paternal Grandmother     No Known Problems Paternal Grandfather     Prostate cancer Brother     No Known Problems Brother     No Known Problems Brother     No Known Problems Brother     No Known Problems Brother     Lymphoma Brother     No Known Problems Paternal Aunt     Substance Abuse Neg Hx     Mental illness Neg Hx     Colon cancer Neg Hx     Colon polyps Neg Hx        Social History     Occupational History    Not on file   Tobacco Use    Smoking status: Former     Current packs/day: 0.00     Average packs/day: 0.5 packs/day for 20.0 years (10.0 ttl pk-yrs)     Types: Cigarettes     Start date: 1979     Quit date: 1999     Years since quittin.6    Smokeless tobacco: Never   Vaping Use    Vaping status: Never Used   Substance and Sexual Activity    Alcohol use: Yes     Alcohol/week: 1.0 standard drink of alcohol     Types: 1 Cans of beer per week    Drug use: Never    Sexual activity: Not on file         Current Outpatient Medications:     atorvastatin (LIPITOR) 20 mg tablet, Take 1 tablet (20 mg total) by mouth daily, Disp: 90 tablet, Rfl: 3    clobetasol (TEMOVATE) 0.05 % cream, Apply topically 2 (two) times a day (Patient not  taking: Reported on 9/29/2023), Disp: 30 g, Rfl: 0    famotidine (PEPCID) 20 mg tablet, Take 1 tablet (20 mg total) by mouth daily at bedtime, Disp: 90 tablet, Rfl: 3    fluticasone (FLONASE) 50 mcg/act nasal spray, 1 spray into each nostril daily, Disp: 18 g, Rfl: 2    omeprazole (PriLOSEC) 20 mg delayed release capsule, Take 1 capsule (20 mg total) by mouth daily Take 30-60 minutes before your first meal of the day, Disp: 90 capsule, Rfl: 3    predniSONE 20 mg tablet, TAKE 1 TABLET BID X 5 DAYS THEN TAKE 1 TABLET QD FOR 5 DAYS, Disp: 15 tablet, Rfl: 0    tamsulosin (FLOMAX) 0.4 mg, Take 1 capsule (0.4 mg total) by mouth daily with dinner (Patient not taking: Reported on 11/22/2023), Disp: 10 capsule, Rfl: 0    No Known Allergies    Objective:  Vitals:    07/12/24 0726   BP: 128/72       The patient was awake, alert, and oriented to person, place, and time.  No acute distress.  Normocephalic.  EOMI.  Mucous membranes moist.  Normal respiratory effort.    Examination of the affected extremity was compared to the unaffected extremity.  Skin was intact.  No swelling or ecchymosis.  No deformity.  Hand and fingers were warm and well-perfused.  Capillary refill was brisk.  Full active range of motion of the elbows, forearms, wrists, and fingers.  5/5 wrist flexor/extensors and .     Right wrist:  Full composite fist  - tinel's at the wrist  Mild APB atrophy bilaterally (possible pseudoatrophy given [facility] joint djd)  + carpal tunnel compression    Imaging/Diagnostic Studies:    No new images reviewed in the office today        This document was created using speech voice recognition software.   Grammatical errors, random word insertions, pronoun errors, and incomplete sentences are an occasional consequence of this system due to software limitations, ambient noise, and hardware issues.   Any formal questions or concerns about content, text, or information contained within the body of this dictation should be directly  addressed to the provider for clarification.    Scribe Attestation      I,:  Cuca Lee PA-C am acting as a scribe while in the presence of the attending physician.:       I,:  Angelica Matt MD personally performed the services described in this documentation    as scribed in my presence.:

## 2024-09-12 ENCOUNTER — TELEPHONE (OUTPATIENT)
Dept: FAMILY MEDICINE CLINIC | Facility: CLINIC | Age: 74
End: 2024-09-12

## 2024-09-13 ENCOUNTER — OFFICE VISIT (OUTPATIENT)
Dept: FAMILY MEDICINE CLINIC | Facility: CLINIC | Age: 74
End: 2024-09-13
Payer: MEDICARE

## 2024-09-13 VITALS
SYSTOLIC BLOOD PRESSURE: 124 MMHG | HEART RATE: 76 BPM | OXYGEN SATURATION: 98 % | BODY MASS INDEX: 24.75 KG/M2 | WEIGHT: 131 LBS | DIASTOLIC BLOOD PRESSURE: 80 MMHG

## 2024-09-13 DIAGNOSIS — J01.00 SUBACUTE MAXILLARY SINUSITIS: Primary | ICD-10-CM

## 2024-09-13 PROCEDURE — 99214 OFFICE O/P EST MOD 30 MIN: CPT | Performed by: FAMILY MEDICINE

## 2024-09-13 PROCEDURE — G2211 COMPLEX E/M VISIT ADD ON: HCPCS | Performed by: FAMILY MEDICINE

## 2024-09-13 NOTE — PROGRESS NOTES
Assessment/Plan:    No problem-specific Assessment & Plan notes found for this encounter.       Diagnoses and all orders for this visit:    Subacute maxillary sinusitis      Depression Screening Follow-up Plan: Patient's depression screening was positive with a PHQ-2 score of 6. Their PHQ-9 score was 14. Patient assessed for underlying major depression. They have no active suicidal ideations. Brief counseling provided and recommend additional follow-up/re-evaluation next office visit.    Subjective:   Chief Complaint   Patient presents with    Headache    Nausea    Dizziness    Fatigue     Pt took a COVID test last week and it was negative.    Sore Throat        Patient ID: Shasha Ken is a 74 y.o. female.    Headache  Nausea  Associated symptoms include fatigue, headaches, nausea and a sore throat. Pertinent negatives include no abdominal pain, arthralgias, chest pain, congestion, fever, myalgias, numbness, vomiting or weakness.   Dizziness  Associated symptoms include fatigue, headaches, nausea and a sore throat. Pertinent negatives include no abdominal pain, arthralgias, chest pain, congestion, fever, myalgias, numbness, vomiting or weakness.   Fatigue  Associated symptoms include fatigue, headaches, nausea and a sore throat. Pertinent negatives include no abdominal pain, arthralgias, chest pain, congestion, fever, myalgias, numbness, vomiting or weakness.   Sore Throat   Associated symptoms include headaches. Pertinent negatives include no abdominal pain, congestion, shortness of breath or vomiting.       The following portions of the patient's history were reviewed and updated as appropriate: allergies, current medications, past family history, past medical history, past social history, past surgical history and problem list.    Review of Systems   Constitutional:  Positive for fatigue. Negative for fever and unexpected weight change.   HENT:  Positive for sore throat. Negative for congestion and sinus  pain.    Eyes:  Negative for visual disturbance.   Respiratory:  Negative for shortness of breath and wheezing.    Cardiovascular:  Negative for chest pain and palpitations.   Gastrointestinal:  Positive for nausea. Negative for abdominal pain and vomiting.   Musculoskeletal: Negative.  Negative for arthralgias and myalgias.   Neurological:  Positive for dizziness and headaches. Negative for syncope, weakness and numbness.   Psychiatric/Behavioral: Negative.  Negative for confusion, dysphoric mood and suicidal ideas.          Objective:  Vitals:    09/13/24 1128   BP: 124/80   BP Location: Left arm   Patient Position: Sitting   Cuff Size: Standard   Pulse: 76   SpO2: 98%   Weight: 59.4 kg (131 lb)      Physical Exam  Constitutional:       Appearance: She is well-developed.   HENT:      Right Ear: Ear canal normal. Tympanic membrane is not injected.      Left Ear: Ear canal normal. Tympanic membrane is not injected.      Nose: Nose normal.   Eyes:      General:         Right eye: No discharge.         Left eye: No discharge.      Conjunctiva/sclera: Conjunctivae normal.      Pupils: Pupils are equal, round, and reactive to light.   Neck:      Thyroid: No thyromegaly.   Cardiovascular:      Rate and Rhythm: Normal rate and regular rhythm.      Heart sounds: Normal heart sounds. No murmur heard.  Pulmonary:      Effort: Pulmonary effort is normal. No respiratory distress.      Breath sounds: Normal breath sounds. No wheezing.   Abdominal:      General: Bowel sounds are normal. There is no distension.      Palpations: Abdomen is soft.      Tenderness: There is no abdominal tenderness.   Musculoskeletal:         General: Normal range of motion.      Cervical back: Normal range of motion and neck supple.   Lymphadenopathy:      Cervical: No cervical adenopathy.   Skin:     General: Skin is warm and dry.   Neurological:      Mental Status: She is alert and oriented to person, place, and time. She is not disoriented.       Sensory: No sensory deficit.      Motor: No weakness.      Coordination: Coordination normal.      Gait: Gait normal.      Deep Tendon Reflexes: Reflexes are normal and symmetric.   Psychiatric:         Speech: Speech normal.         Behavior: Behavior normal.         Thought Content: Thought content normal.         Judgment: Judgment normal.

## 2024-10-01 ENCOUNTER — OFFICE VISIT (OUTPATIENT)
Dept: OBGYN CLINIC | Facility: CLINIC | Age: 74
End: 2024-10-01
Payer: MEDICARE

## 2024-10-01 VITALS
SYSTOLIC BLOOD PRESSURE: 138 MMHG | BODY MASS INDEX: 26.11 KG/M2 | DIASTOLIC BLOOD PRESSURE: 78 MMHG | HEART RATE: 69 BPM | WEIGHT: 133 LBS | HEIGHT: 60 IN

## 2024-10-01 DIAGNOSIS — G56.01 CARPAL TUNNEL SYNDROME ON RIGHT: Primary | ICD-10-CM

## 2024-10-01 PROCEDURE — 99213 OFFICE O/P EST LOW 20 MIN: CPT | Performed by: ORTHOPAEDIC SURGERY

## 2024-10-01 PROCEDURE — 20526 THER INJECTION CARP TUNNEL: CPT | Performed by: ORTHOPAEDIC SURGERY

## 2024-10-01 RX ORDER — BETAMETHASONE SODIUM PHOSPHATE AND BETAMETHASONE ACETATE 3; 3 MG/ML; MG/ML
6 INJECTION, SUSPENSION INTRA-ARTICULAR; INTRALESIONAL; INTRAMUSCULAR; SOFT TISSUE
Status: COMPLETED | OUTPATIENT
Start: 2024-10-01 | End: 2024-10-01

## 2024-10-01 RX ORDER — LIDOCAINE HYDROCHLORIDE 10 MG/ML
1 INJECTION, SOLUTION INFILTRATION; PERINEURAL
Status: COMPLETED | OUTPATIENT
Start: 2024-10-01 | End: 2024-10-01

## 2024-10-01 RX ADMIN — LIDOCAINE HYDROCHLORIDE 1 ML: 10 INJECTION, SOLUTION INFILTRATION; PERINEURAL at 08:15

## 2024-10-01 RX ADMIN — BETAMETHASONE SODIUM PHOSPHATE AND BETAMETHASONE ACETATE 6 MG: 3; 3 INJECTION, SUSPENSION INTRA-ARTICULAR; INTRALESIONAL; INTRAMUSCULAR; SOFT TISSUE at 08:15

## 2024-10-01 NOTE — PROGRESS NOTES
Assessment/Plan:  1. Carpal tunnel syndrome on right              Discussed with the patient that her signs and symptoms are consistent with ongoing right carpal tunnel syndrome.  Treatment options were discussed in the form of splinting and repeat steroid injections. Injections occur every 8 weeks at a minimum, however, if these injections provide less than 6 weeks worth of relief, I would advise against additional injections.  The patient was given an opportunity to ask questions.  Questions were answered to the patient's satisfaction.    A discussion was had with the patient that she has two options moving forward.  We could either continue non-operative treatments or proceed with surgery.    The details of a potential surgical procedure were discussed in-depth with the patient.  We could do this under sedation or local anesthesia.  If she wished to remain awake for the procedure, lidocaine would be injected into the surgical area to numb her wrist.  After the procedure, she would be placed into a splint.  2 weeks after surgery, she will see the hand therapistsfor suture removal and to discuss nerve glide exercises.    If she proceeds with surgery, her wrist could be sore for 6 weeks, but she should feel better after this.  We discussed that surgery is curative and surgery is often highly effective for numbness/tingling.  Through shared decision making, the patient decided to move forward with another steroid injection today, and she will think about surgery closer to the winter. She understands that if she has another injection now, she would be unable to proceed with carpal tunnel release surgery for at least 3 months to decrease the risk for infection.  The patient consented and underwent a right carpal tunnel injection in the office today without any complications. Post-injection instructions were reviewed.  She was instructed to stretch before playing pickle ball or golfing.  Stretches were reviewed with the  patient today.  She will follow up in 8 weeks for repeat evaluation.  She may cancel this appointment if she is feeling good.  Otherwise, we will get her signed up for surgery.          Hand/upper extremity injection: R carpal tunnel  [place] Protocol:  Consent: Verbal consent obtained.  Consent given by: patient  Timeout called at: 10/1/2024 8:31 AM.  Patient understanding: patient states understanding of the procedure being performed  Patient consent: the patient's understanding of the procedure matches consent given  Procedure consent: procedure consent matches procedure scheduled  Patient identity confirmed: verbally with patient  Supporting Documentation  Indications: pain   Procedure Details  Condition:carpal tunnel syndrome Site: R carpal tunnel   Needle size: 25 G  Medications administered: 1 mL lidocaine 1 %; 6 mg betamethasone acetate-betamethasone sodium phosphate 6 (3-3) mg/mL  Patient tolerance: patient tolerated the procedure well with no immediate complications  Dressing:  Sterile dressing applied           cc: right wrist follow up    Subjective:   Shasha Ken is a 74 y.o. right-hand dominant female who presents to the office today for follow up evaluation right carpal tunnel syndrome. The patient denies any pain today, but notes tingling in the median nerve distribution, especially when gripping the pickle ball paddle. She finds herself shaking out her hand due to tingling sensations. The patient reports adequate symptomatic relief from the steroid injection performed on 7/12/24 for some time. She is an avid golfer and has been playing pickle ball this summer, and she notices her symptoms are worse when she plays pickle ball.  She does not stretch before doing these activities.      Review of Systems   Constitutional:  Negative for chills and fever.   HENT:  Negative for drooling and sneezing.    Eyes:  Negative for redness.   Respiratory:  Negative for cough and wheezing.     Gastrointestinal:  Negative for nausea and vomiting.   Musculoskeletal:  Negative for arthralgias, joint swelling and myalgias.   Neurological:  Negative for weakness and numbness.   Psychiatric/Behavioral:  Negative for behavioral problems. The patient is not nervous/anxious.          Past Medical History:   Diagnosis Date    Anxiety     Arthritis     Baker cyst, left     Cholelithiasis     Chronic headaches     GERD (gastroesophageal reflux disease)     Heart murmur     Hyperlipidemia     Menorrhagia     Sinus infection        Past Surgical History:   Procedure Laterality Date    CHOLECYSTECTOMY      IR ASPIRATION BAKERS CYST Left     LAPAROSCOPIC CHOLECYSTECTOMY         Family History   Problem Relation Age of Onset    Breast cancer Mother 52    Lung cancer Mother     Diabetes Mother     Stroke Father     No Known Problems Maternal Grandmother     No Known Problems Maternal Grandfather     No Known Problems Paternal Grandmother     No Known Problems Paternal Grandfather     Prostate cancer Brother     No Known Problems Brother     No Known Problems Brother     No Known Problems Brother     No Known Problems Brother     Lymphoma Brother     No Known Problems Paternal Aunt     Substance Abuse Neg Hx     Mental illness Neg Hx     Colon cancer Neg Hx     Colon polyps Neg Hx        Social History     Occupational History    Not on file   Tobacco Use    Smoking status: Former     Current packs/day: 0.00     Average packs/day: 0.5 packs/day for 20.0 years (10.0 ttl pk-yrs)     Types: Cigarettes     Start date: 1979     Quit date: 1999     Years since quittin.8    Smokeless tobacco: Never   Vaping Use    Vaping status: Never Used   Substance and Sexual Activity    Alcohol use: Yes     Alcohol/week: 1.0 standard drink of alcohol     Types: 1 Cans of beer per week    Drug use: Never    Sexual activity: Not on file         Current Outpatient Medications:     omeprazole (PriLOSEC) 20 mg delayed release  capsule, Take 1 capsule (20 mg total) by mouth daily Take 30-60 minutes before your first meal of the day, Disp: 90 capsule, Rfl: 3    atorvastatin (LIPITOR) 20 mg tablet, Take 1 tablet (20 mg total) by mouth daily (Patient not taking: Reported on 10/1/2024), Disp: 90 tablet, Rfl: 3    clobetasol (TEMOVATE) 0.05 % cream, Apply topically 2 (two) times a day (Patient not taking: Reported on 9/29/2023), Disp: 30 g, Rfl: 0    famotidine (PEPCID) 20 mg tablet, Take 1 tablet (20 mg total) by mouth daily at bedtime (Patient not taking: Reported on 9/13/2024), Disp: 90 tablet, Rfl: 3    fluticasone (FLONASE) 50 mcg/act nasal spray, 1 spray into each nostril daily, Disp: 18 g, Rfl: 2    predniSONE 20 mg tablet, TAKE 1 TABLET BID X 5 DAYS THEN TAKE 1 TABLET QD FOR 5 DAYS, Disp: 15 tablet, Rfl: 0    tamsulosin (FLOMAX) 0.4 mg, Take 1 capsule (0.4 mg total) by mouth daily with dinner (Patient not taking: Reported on 11/22/2023), Disp: 10 capsule, Rfl: 0    No Known Allergies    Objective:  Vitals:    10/01/24 0816   BP: 138/78   Pulse: 69       The patient was awake, alert, and oriented to person, place, and time.  No acute distress.  Normocephalic.  EOMI.  Mucous membranes moist.  Normal respiratory effort.    Examination of the affected extremity was compared to the unaffected extremity.  Skin was intact.  No swelling or ecchymosis.  No deformity.  Hand and fingers were warm and well-perfused.  Capillary refill was brisk.  Full active range of motion of the elbows, forearms, wrists, and fingers.  5/5 wrist flexor/extensors and .     Right wrist:  Full composite fist  - tinel's at the wrist  Mild APB atrophy bilaterally (possible pseudoatrophy given CMC joint djd)  Sensation intact.  Median nerve sensation comparable to contralateral side.    Imaging/Diagnostic Studies:    No new images reviewed in the office today      Scribe Attestation      I,:  Madeline Ruiz PA-C am acting as a scribe while in the presence of the  attending physician.:       I,:  Angelica Matt MD personally performed the services described in this documentation    as scribed in my presence.:

## 2024-11-26 ENCOUNTER — OFFICE VISIT (OUTPATIENT)
Dept: OBGYN CLINIC | Facility: CLINIC | Age: 74
End: 2024-11-26
Payer: MEDICARE

## 2024-11-26 VITALS
DIASTOLIC BLOOD PRESSURE: 62 MMHG | SYSTOLIC BLOOD PRESSURE: 120 MMHG | WEIGHT: 131 LBS | HEIGHT: 60 IN | BODY MASS INDEX: 25.72 KG/M2

## 2024-11-26 DIAGNOSIS — G56.01 CARPAL TUNNEL SYNDROME ON RIGHT: Primary | ICD-10-CM

## 2024-11-26 PROCEDURE — 99213 OFFICE O/P EST LOW 20 MIN: CPT | Performed by: ORTHOPAEDIC SURGERY

## 2024-11-26 NOTE — PROGRESS NOTES
Assessment/Plan:  1. Carpal tunnel syndrome on right            Patient's symptoms have improved over time.  Currently she has numbness at the fingertips.  Pain and hand numbness have resolved.  No nighttime symptoms.  At this point in time I recommended observation with anticipation that the symptoms will continue to improve.  Treatment options were discussed which included nonoperative and operative treatment.    Risks, benefits, and realistic expectations for treatment options were discussed.    The patient was given an opportunity to ask questions.  Questions were answered to the patient's satisfaction.    Through shared decision making, the patient decided to move forward with monitoring carpal tunnel symptoms due to improvement of symptoms from previous cortisone injection on 10/1/2024.   Patient may continue with activity without restriction. Stretches reviewed with patient for utilization before activities.  Follow up as needed.          cc: right carpal tunnel    Subjective:   Shasha Ken is a right hand dominant 74 y.o. female who presents today for follow up for right carpal tunnel syndrome. She states she is doing well overall. She has had improvement from her last cortisone injection. She does not have the burning and numbness in the hand and fingers. She does have some continued tingling in the finger tips. She has been able to continue with activity without worsening symptoms.      Review of Systems   Constitutional:  Negative for chills and fever.   HENT:  Negative for ear pain and sore throat.    Eyes:  Negative for pain and visual disturbance.   Respiratory:  Negative for cough and shortness of breath.    Cardiovascular:  Negative for chest pain and palpitations.   Gastrointestinal:  Negative for abdominal pain and vomiting.   Genitourinary:  Negative for dysuria and hematuria.   Musculoskeletal:  Negative for arthralgias and back pain.   Skin:  Negative for color change and rash.    Neurological:  Negative for seizures and syncope.   All other systems reviewed and are negative.        Past Medical History:   Diagnosis Date    Anxiety     Arthritis     Baker cyst, left     Cholelithiasis     Chronic headaches     GERD (gastroesophageal reflux disease)     Heart murmur     Hyperlipidemia     Menorrhagia     Sinus infection        Past Surgical History:   Procedure Laterality Date    CHOLECYSTECTOMY      IR ASPIRATION BAKERS CYST Left     LAPAROSCOPIC CHOLECYSTECTOMY         Family History   Problem Relation Age of Onset    Breast cancer Mother 52    Lung cancer Mother     Diabetes Mother     Stroke Father     No Known Problems Maternal Grandmother     No Known Problems Maternal Grandfather     No Known Problems Paternal Grandmother     No Known Problems Paternal Grandfather     Prostate cancer Brother     No Known Problems Brother     No Known Problems Brother     No Known Problems Brother     No Known Problems Brother     Lymphoma Brother     No Known Problems Paternal Aunt     Substance Abuse Neg Hx     Mental illness Neg Hx     Colon cancer Neg Hx     Colon polyps Neg Hx        Social History     Occupational History    Not on file   Tobacco Use    Smoking status: Former     Current packs/day: 0.00     Average packs/day: 0.5 packs/day for 20.0 years (10.0 ttl pk-yrs)     Types: Cigarettes     Start date: 1979     Quit date: 1999     Years since quittin.9    Smokeless tobacco: Never   Vaping Use    Vaping status: Never Used   Substance and Sexual Activity    Alcohol use: Yes     Alcohol/week: 1.0 standard drink of alcohol     Types: 1 Cans of beer per week    Drug use: No    Sexual activity: Not Currently     Partners: Male     Birth control/protection: Post-menopausal         Current Outpatient Medications:     atorvastatin (LIPITOR) 20 mg tablet, Take 1 tablet (20 mg total) by mouth daily (Patient not taking: Reported on 10/1/2024), Disp: 90 tablet, Rfl: 3    clobetasol  (TEMOVATE) 0.05 % cream, Apply topically 2 (two) times a day (Patient not taking: Reported on 9/29/2023), Disp: 30 g, Rfl: 0    famotidine (PEPCID) 20 mg tablet, Take 1 tablet (20 mg total) by mouth daily at bedtime (Patient not taking: Reported on 9/13/2024), Disp: 90 tablet, Rfl: 3    fluticasone (FLONASE) 50 mcg/act nasal spray, 1 spray into each nostril daily, Disp: 18 g, Rfl: 2    omeprazole (PriLOSEC) 20 mg delayed release capsule, Take 1 capsule (20 mg total) by mouth daily Take 30-60 minutes before your first meal of the day, Disp: 90 capsule, Rfl: 3    predniSONE 20 mg tablet, TAKE 1 TABLET BID X 5 DAYS THEN TAKE 1 TABLET QD FOR 5 DAYS, Disp: 15 tablet, Rfl: 0    tamsulosin (FLOMAX) 0.4 mg, Take 1 capsule (0.4 mg total) by mouth daily with dinner (Patient not taking: Reported on 11/22/2023), Disp: 10 capsule, Rfl: 0    No Known Allergies    Objective:  Vitals:    11/26/24 0807   BP: 120/62       The patient was awake, alert, and oriented to person, place, and time.  No acute distress.  Normocephalic.  EOMI.  Mucous membranes moist.  Normal respiratory effort.    Examination of the affected extremity was compared to the unaffected extremity.  Skin was intact.  No swelling or ecchymosis.  No deformity.  Hand and fingers were warm and well-perfused.  Capillary refill was brisk.  Full active range of motion of the elbows, forearms, wrists, and fingers.      Sensation was not decreased in the median nerve distribution.  Sensation was not decreased in the ulnar nerve distribution.  There was not APB atrophy.  There was no intrinsic atrophy. Tinel's at the wrist was Negative. Tinel's at the elbow was Negative. Wrist flexion compression was negative. Evaluation for lacertus syndrome was Negative.       Imaging/Diagnostic Studies:    No new images at today's visit.        This document was created using speech voice recognition software.   Grammatical errors, random word insertions, pronoun errors, and incomplete  sentences are an occasional consequence of this system due to software limitations, ambient noise, and hardware issues.   Any formal questions or concerns about content, text, or information contained within the body of this dictation should be directly addressed to the provider for clarification.     Scribe Attestation      I,:  Lambert Calvillo am acting as a scribe while in the presence of the attending physician.:       I,:  Angelica Matt MD personally performed the services described in this documentation    as scribed in my presence.:

## 2024-12-18 ENCOUNTER — OFFICE VISIT (OUTPATIENT)
Dept: GASTROENTEROLOGY | Facility: CLINIC | Age: 74
End: 2024-12-18
Payer: MEDICARE

## 2024-12-18 VITALS
SYSTOLIC BLOOD PRESSURE: 130 MMHG | WEIGHT: 130 LBS | BODY MASS INDEX: 25.52 KG/M2 | DIASTOLIC BLOOD PRESSURE: 82 MMHG | HEIGHT: 60 IN

## 2024-12-18 DIAGNOSIS — R19.8 RECTAL PRESSURE: Primary | ICD-10-CM

## 2024-12-18 DIAGNOSIS — K64.8 INTERNAL HEMORRHOIDS: ICD-10-CM

## 2024-12-18 DIAGNOSIS — K21.9 GASTROESOPHAGEAL REFLUX DISEASE WITHOUT ESOPHAGITIS: ICD-10-CM

## 2024-12-18 DIAGNOSIS — K64.4 EXTERNAL HEMORRHOIDS: ICD-10-CM

## 2024-12-18 DIAGNOSIS — Z12.11 COLON CANCER SCREENING: ICD-10-CM

## 2024-12-18 PROCEDURE — 99214 OFFICE O/P EST MOD 30 MIN: CPT | Performed by: PHYSICIAN ASSISTANT

## 2024-12-18 RX ORDER — HYDROCORTISONE 25 MG/G
CREAM TOPICAL 2 TIMES DAILY PRN
Qty: 28 G | Refills: 2 | Status: SHIPPED | OUTPATIENT
Start: 2024-12-18

## 2024-12-18 NOTE — ASSESSMENT & PLAN NOTE
Chronic, stable on omeprazole every 2-3 days  Continue PPI  Recommend diet/lifestyle modifications - handout(s) provided today  Please call the office or schedule an appointment if symptoms worsen or fail to improve.  Orders:    omeprazole (PriLOSEC) 20 mg delayed release capsule; Take 1 capsule (20 mg total) by mouth daily Take 30-60 minutes before your first meal of the day

## 2024-12-18 NOTE — PROGRESS NOTES
Name: Shasha Ken      : 1950      MRN: [id number]  Encounter Provider: Avril Page PA-C  Encounter Date: 2024   Encounter department: [facility]    :  Assessment & Plan  Rectal pressure  New in past 1 month. Feels likely exacerbated by some recent heavy lifting at work. Likely 2/2 internal & external hemorrhoids noted on exam today. Colon cancer screening is UTD with Cologuard       External hemorrhoids  Chronic, currently exacerbated  START topical corticosteroids prn  Continue conservative management  Recommend diet/lifestyle modifications - handout(s) provided today  Please call the office or schedule an appointment if symptoms worsen or fail to improve.  Orders:    hydrocortisone (ANUSOL-HC) 2.5 % rectal cream; Apply topically 2 (two) times a day as needed for hemorrhoids Use no more than 14 days per month    Internal hemorrhoids  Chronic, currently exacerbated  START topical phenylephrine suppositories prn  Continue conservative management  Recommend diet/lifestyle modifications - handout(s) provided today   Please call the office or schedule an appointment if symptoms worsen or fail to improve.  Orders:    phenylephrine (HEMORRHOIDAL) 0.25 % suppository; Insert 1 suppository into the rectum 2 (two) times a day as needed for hemorrhoids    Gastroesophageal reflux disease without esophagitis  Chronic, stable on omeprazole every 2-3 days  Continue PPI  Recommend diet/lifestyle modifications - handout(s) provided today  Please call the office or schedule an appointment if symptoms worsen or fail to improve.  Orders:    omeprazole (PriLOSEC) 20 mg delayed release capsule; Take 1 capsule (20 mg total) by mouth daily Take 30-60 minutes before your first meal of the day    Colon cancer screening  Negative Cologuard: 2023  recall: 3 yrs  due: 2026         Return if symptoms worsen or fail to improve.      History of Present Illness  "    Chief Complaint   Patient presents with    Hemorrhoids     Has had hemorrhoids before, feels pressure and that she has to have a bowel movement but does not, no blood, feels irritated as well. Pt also on Omeprazole and wants  to know how long she should be on it, does feel symptoms when she does not take it       Accompanied by self and history is obtained from self.    Patient is a 74 y.o. female with a significant PMH of GERD presenting for rectal discomfort.    Rectal discomfort  Hx/o hemorrhoids during pregnancy  Was supposed to have surgery for these but never did  Having rectal pressure, irritation all the time  Tried preparation H once daily  No bleeding  No abdominal pain  Moves her bowels every 1-2 days    GERD  Last seen 11/2023 by myself for the same  On omeprazole 20 mg QAM  -----  Feels bad when she doesn't take the PPI  Will have abdominal discomfort after eating  Currently taking it every 2-3 days    Workup to date:   EGD: none - declined due to hesitation re: procedural sedation  Colonoscopy: (-) Cologuard 12/2023, repeat due 12/2026  Recent GI imaging: CT A/P 9/13/2023: \"Impression: No urinary calculi identified. No findings to account for left lower quadrant pain. Moderate hiatal hernia.     Review of Systems   Constitutional:  Negative for appetite change, chills, fever and unexpected weight change.   HENT:  Negative for sore throat, trouble swallowing and voice change.    Respiratory:  Negative for cough and choking.    Cardiovascular:  Negative for chest pain and leg swelling.   Gastrointestinal:  Negative for abdominal distention, abdominal pain, anal bleeding, blood in stool, constipation, diarrhea, nausea, rectal pain (pressure) and vomiting.   Skin:  Negative for pallor and rash.   Neurological:  Negative for weakness, light-headedness and headaches.   Psychiatric/Behavioral:  Negative for confusion and sleep disturbance. The patient is not nervous/anxious.        Objective   /82  "  Ht 5' (1.524 m)   Wt 59 kg (130 lb)   LMP  (LMP Unknown)   BMI 25.39 kg/m²     Blood pressure 130/82, height 5' (1.524 m), weight 59 kg (130 lb), not currently breastfeeding. Body mass index is 25.39 kg/m².    Physical Exam  Vitals and nursing note reviewed. Exam conducted with a chaperone present (GONZALO Webster).   Constitutional:       General: She is not in acute distress.     Appearance: She is not toxic-appearing.   HENT:      Head: Normocephalic.      Mouth/Throat:      Mouth: Mucous membranes are moist.      Pharynx: Oropharynx is clear.   Eyes:      General: No scleral icterus.     Extraocular Movements: Extraocular movements intact.   Neck:      Trachea: Phonation normal.   Cardiovascular:      Comments: Appears well perfused  Pulmonary:      Effort: Pulmonary effort is normal. No respiratory distress.   Genitourinary:     Rectum: External hemorrhoid and internal hemorrhoid present. No tenderness or anal fissure. Normal anal tone.   Musculoskeletal:      Cervical back: Neck supple.      Comments: Moving all 4 extremities spontaneously   Skin:     General: Skin is warm and dry.      Capillary Refill: Capillary refill takes less than 2 seconds.      Coloration: Skin is not jaundiced or pale.   Neurological:      General: No focal deficit present.      Mental Status: She is alert and oriented to person, place, and time.   Psychiatric:         Behavior: Behavior normal. Behavior is cooperative.         Thought Content: Thought content normal.       I have spent a total time of 20 minutes on 12/18/24 in caring for this patient including Diagnostic results, Prognosis, Risks and benefits of tx options, Instructions for management, Patient and family education, Importance of tx compliance, Risk factor reductions, Impressions, Counseling / Coordination of care, Documenting in the medical record, Reviewing / ordering tests, medicine, procedures  , and Obtaining or reviewing history  .    Patient expressed  understanding and had all questions and concerns addressed.    Avril Pgae PA-C  12/18/24  3:48 PM    This chart was completed in part utilizing [employer] speech voice recognition software. Random word insertions, pronoun errors, and incomplete sentences are an occasional consequence of this system due to software limitations, and ambient noise. Any questions or concerns about the content, text, or information contained within the body of this dictation should be directly addressed to the provider for clarification.

## 2025-02-13 ENCOUNTER — OFFICE VISIT (OUTPATIENT)
Dept: FAMILY MEDICINE CLINIC | Facility: CLINIC | Age: 75
End: 2025-02-13
Payer: MEDICARE

## 2025-02-13 VITALS
TEMPERATURE: 97.4 F | HEART RATE: 93 BPM | OXYGEN SATURATION: 98 % | WEIGHT: 131.4 LBS | SYSTOLIC BLOOD PRESSURE: 138 MMHG | HEIGHT: 60 IN | BODY MASS INDEX: 25.8 KG/M2 | DIASTOLIC BLOOD PRESSURE: 80 MMHG

## 2025-02-13 DIAGNOSIS — Z01.818 PRE-OP EXAMINATION: ICD-10-CM

## 2025-02-13 DIAGNOSIS — E78.2 MIXED HYPERLIPIDEMIA: ICD-10-CM

## 2025-02-13 DIAGNOSIS — Z00.00 MEDICARE ANNUAL WELLNESS VISIT, SUBSEQUENT: Primary | ICD-10-CM

## 2025-02-13 PROCEDURE — G0439 PPPS, SUBSEQ VISIT: HCPCS | Performed by: FAMILY MEDICINE

## 2025-02-13 PROCEDURE — G2211 COMPLEX E/M VISIT ADD ON: HCPCS | Performed by: FAMILY MEDICINE

## 2025-02-13 PROCEDURE — 99214 OFFICE O/P EST MOD 30 MIN: CPT | Performed by: FAMILY MEDICINE

## 2025-02-13 RX ORDER — ROSUVASTATIN CALCIUM 5 MG/1
5 TABLET, COATED ORAL DAILY
Qty: 100 TABLET | Refills: 3 | Status: SHIPPED | OUTPATIENT
Start: 2025-02-13

## 2025-02-13 NOTE — PROGRESS NOTES
Name: Shasha Ken      : 1950      MRN: [id number]  Encounter Provider: Alan Connor MD  Encounter Date: 2025   Encounter department: [facility]    Assessment & Plan  Medicare annual wellness visit, subsequent         Mixed hyperlipidemia    Orders:    Comprehensive metabolic panel; Future    Lipid Panel with Direct LDL reflex; Future    CBC and differential; Future    rosuvastatin (CRESTOR) 5 mg tablet; Take 1 tablet (5 mg total) by mouth daily    Pre-op examination  Pt examined---cleared for CTS surg---no pre-op testing indic             Depression Screening and Follow-up Plan: Patient was screened for depression during today's encounter. They screened negative with a PHQ-2 score of 0.        Preventive health issues were discussed with patient, and age appropriate screening tests were ordered as noted in patient's After Visit Summary. Personalized health advice and appropriate referrals for health education or preventive services given if needed, as noted in patient's After Visit Summary.    History of Present Illness         Patient Care Team:  Alan Connor MD as PCP - General (Family Medicine)    Review of Systems  Annual Wellness Visit Questionnaire   Last Medicare Wellness visit information reviewed, patient interviewed and updates made to the record today.      Health Risk Assessment:   Patient rates overall health as good. Patient feels that their physical health rating is same. Patient is satisfied with their life. Eyesight was rated as same. Hearing was rated as same. Patient feels that their emotional and mental health rating is same. Patients states they are never, rarely angry. Patient states they are sometimes unusually tired/fatigued. Pain experienced in the last 7 days has been some. Patient's pain rating has been 7/10. Patient states that she has experienced no weight loss or gain in last 6 months.     Depression Screening:   PHQ-2 Score:  0      Fall Risk Screening:   In the past year, patient has experienced: history of falling in past year      Urinary Incontinence Screening:   Patient has not leaked urine accidently in the last six months.     Home Safety:  Patient does not have trouble with stairs inside or outside of their home. Patient has working smoke alarms and has working carbon monoxide detector. Home safety hazards include: none.     Nutrition:   Current diet is Regular and Limited junk food.     Medications:   Patient is not currently taking any over-the-counter supplements. Patient is able to manage medications.     Activities of Daily Living (ADLs)/Instrumental Activities of Daily Living (IADLs):   Walk and transfer into and out of bed and chair?: Yes  Dress and groom yourself?: Yes    Bathe or shower yourself?: Yes    Feed yourself? Yes  Do your laundry/housekeeping?: Yes  Manage your money, pay your bills and track your expenses?: Yes  Make your own meals?: Yes    Do your own shopping?: Yes    Durable Medical Equipment Suppliers  None    Previous Hospitalizations:   Any hospitalizations or ED visits within the last 12 months?: No      Advance Care Planning:   Living will: Yes    Durable POA for healthcare: Yes    Advanced directive: Yes    Provider agrees with end of life decisions: Yes      Cognitive Screening:   Provider or family/friend/caregiver concerned regarding cognition?: No    PREVENTIVE SCREENINGS      Cardiovascular Screening:    General: Screening Not Indicated and History Lipid Disorder      Diabetes Screening:     General: Risks and Benefits Discussed      Colorectal Cancer Screening:     General: Screening Current      Breast Cancer Screening:     General: Screening Current      Cervical Cancer Screening:    General: Screening Not Indicated      Osteoporosis Screening:    General: Risks and Benefits Discussed      Abdominal Aortic Aneurysm (AAA) Screening:        General: Screening Not Indicated      Lung Cancer  Screening:     General: Screening Not Indicated      Hepatitis C Screening:    General: Screening Current    Screening, Brief Intervention, and Referral to Treatment (SBIRT)     Screening  Typical number of drinks in a day: 0  Typical number of drinks in a week: 1  Interpretation: Low risk drinking behavior.    AUDIT-C Screenin) How often did you have a drink containing alcohol in the past year? 2 to 4 times a month  2) How many drinks did you have on a typical day when you were drinking in the past year? 0  3) How often did you have 6 or more drinks on one occasion in the past year? never    AUDIT-C Score: 2  Interpretation: Score 0-2 (female): Negative screen for alcohol misuse    Single Item Drug Screening:  How often have you used an illegal drug (including marijuana) or a prescription medication for non-medical reasons in the past year? never    Single Item Drug Screen Score: 0  Interpretation: Negative screen for possible drug use disorder    Social Drivers of Health     Financial Resource Strain: Low Risk  (2024)    Overall Financial Resource Strain (CARDIA)     Difficulty of Paying Living Expenses: Not hard at all   Food Insecurity: No Food Insecurity (2025)    Hunger Vital Sign     Worried About Running Out of Food in the Last Year: Never true     Ran Out of Food in the Last Year: Never true   Transportation Needs: No Transportation Needs (2025)    PRAPARE - Transportation     Lack of Transportation (Medical): No     Lack of Transportation (Non-Medical): No   Housing Stability: Low Risk  (2025)    Housing Stability Vital Sign     Unable to Pay for Housing in the Last Year: No     Number of Times Moved in the Last Year: 0     Homeless in the Last Year: No   Utilities: Not At Risk (2025)    [facility] Utilities     Threatened with loss of utilities: No     No results found.    Objective   /80 (BP Location: Left arm, Patient Position: Sitting, Cuff Size: Standard)   Pulse 93   Temp  (!) 97.4 °F (36.3 °C) (Tympanic)   Ht 5' (1.524 m)   Wt 59.6 kg (131 lb 6.4 oz)   LMP  (LMP Unknown)   SpO2 98%   BMI 25.66 kg/m²       Physical Exam  Constitutional:       Appearance: She is well-developed.   HENT:      Right Ear: Ear canal normal. Tympanic membrane is not injected.      Left Ear: Ear canal normal. Tympanic membrane is not injected.      Nose: Nose normal.   Eyes:      General:         Right eye: No discharge.         Left eye: No discharge.      Conjunctiva/sclera: Conjunctivae normal.      Pupils: Pupils are equal, round, and reactive to light.   Neck:      Thyroid: No thyromegaly.   Cardiovascular:      Rate and Rhythm: Normal rate and regular rhythm.      Heart sounds: Normal heart sounds. No murmur heard.  Pulmonary:      Effort: Pulmonary effort is normal. No respiratory distress.      Breath sounds: Normal breath sounds. No wheezing.   Abdominal:      General: Bowel sounds are normal. There is no distension.      Palpations: Abdomen is soft.      Tenderness: There is no abdominal tenderness.   Musculoskeletal:         General: Normal range of motion.      Cervical back: Normal range of motion and neck supple.   Lymphadenopathy:      Cervical: No cervical adenopathy.   Skin:     General: Skin is warm and dry.   Neurological:      Mental Status: She is alert and oriented to person, place, and time. She is not disoriented.      Sensory: No sensory deficit.      Motor: No weakness.      Coordination: Coordination normal.      Gait: Gait normal.      Deep Tendon Reflexes: Reflexes are normal and symmetric.   Psychiatric:         Speech: Speech normal.         Behavior: Behavior normal.         Thought Content: Thought content normal.         Judgment: Judgment normal.

## 2025-02-13 NOTE — PATIENT INSTRUCTIONS
Medicare Preventive Visit Patient Instructions  Thank you for completing your Welcome to Medicare Visit or Medicare Annual Wellness Visit today. Your next wellness visit will be due in one year (2/14/2026).  The screening/preventive services that you may require over the next 5-10 years are detailed below. Some tests may not apply to you based off risk factors and/or age. Screening tests ordered at today's visit but not completed yet may show as past due. Also, please note that scanned in results may not display below.  Preventive Screenings:  Service Recommendations Previous Testing/Comments   Colorectal Cancer Screening  * Colonoscopy    * Fecal Occult Blood Test (FOBT)/Fecal Immunochemical Test (FIT)  * Fecal DNA/Cologuard Test  * Flexible Sigmoidoscopy Age: 45-75 years old   Colonoscopy: every 10 years (may be performed more frequently if at higher risk)  OR  FOBT/FIT: every 1 year  OR  Cologuard: every 3 years  OR  Sigmoidoscopy: every 5 years  Screening may be recommended earlier than age 45 if at higher risk for colorectal cancer. Also, an individualized decision between you and your healthcare provider will decide whether screening between the ages of 76-85 would be appropriate. Colonoscopy: Not on file  FOBT/FIT: Not on file  Cologuard: 12/18/2023  Sigmoidoscopy: Not on file          Breast Cancer Screening Age: 40+ years old  Frequency: every 1-2 years  Not required if history of left and right mastectomy Mammogram: 02/27/2024        Cervical Cancer Screening Between the ages of 21-29, pap smear recommended once every 3 years.   Between the ages of 30-65, can perform pap smear with HPV co-testing every 5 years.   Recommendations may differ for women with a history of total hysterectomy, cervical cancer, or abnormal pap smears in past. Pap Smear: Not on file        Hepatitis C Screening Once for adults born between 1945 and 1965  More frequently in patients at high risk for Hepatitis C Hep C Antibody:  12/14/2018        Diabetes Screening 1-2 times per year if you're at risk for diabetes or have pre-diabetes Fasting glucose: No results in last 5 years (No results in last 5 years)  A1C: No results in last 5 years (No results in last 5 years)      Cholesterol Screening Once every 5 years if you don't have a lipid disorder. May order more often based on risk factors. Lipid panel: 01/17/2024          Other Preventive Screenings Covered by Medicare:  Abdominal Aortic Aneurysm (AAA) Screening: covered once if your at risk. You're considered to be at risk if you have a family history of AAA.  Lung Cancer Screening: covers low dose CT scan once per year if you meet all of the following conditions: (1) Age 55-77; (2) No signs or symptoms of lung cancer; (3) Current smoker or have quit smoking within the last 15 years; (4) You have a tobacco smoking history of at least 20 pack years (packs per day multiplied by number of years you smoked); (5) You get a written order from a healthcare provider.  Glaucoma Screening: covered annually if you're considered high risk: (1) You have diabetes OR (2) Family history of glaucoma OR (3)  aged 50 and older OR (4)  American aged 65 and older  Osteoporosis Screening: covered every 2 years if you meet one of the following conditions: (1) You're estrogen deficient and at risk for osteoporosis based off medical history and other findings; (2) Have a vertebral abnormality; (3) On glucocorticoid therapy for more than 3 months; (4) Have primary hyperparathyroidism; (5) On osteoporosis medications and need to assess response to drug therapy.   Last bone density test (DXA Scan): 02/09/2023.  HIV Screening: covered annually if you're between the age of 15-65. Also covered annually if you are younger than 15 and older than 65 with risk factors for HIV infection. For pregnant patients, it is covered up to 3 times per pregnancy.    Immunizations:  Immunization Recommendations    Influenza Vaccine Annual influenza vaccination during flu season is recommended for all persons aged >= 6 months who do not have contraindications   Pneumococcal Vaccine   * Pneumococcal conjugate vaccine = PCV13 (Prevnar 13), PCV15 (Vaxneuvance), PCV20 (Prevnar 20)  * Pneumococcal polysaccharide vaccine = PPSV23 (Pneumovax) Adults 19-65 yo with certain risk factors or if 65+ yo  If never received any pneumonia vaccine: recommend Prevnar 20 (PCV20)  Give PCV20 if previously received 1 dose of PCV13 or PPSV23   Hepatitis B Vaccine 3 dose series if at intermediate or high risk (ex: diabetes, end stage renal disease, liver disease)   Respiratory syncytial virus (RSV) Vaccine - COVERED BY MEDICARE PART D  * RSVPreF3 (Arexvy) CDC recommends that adults 60 years of age and older may receive a single dose of RSV vaccine using shared clinical decision-making (SCDM)   Tetanus (Td) Vaccine - COST NOT COVERED BY MEDICARE PART B Following completion of primary series, a booster dose should be given every 10 years to maintain immunity against tetanus. Td may also be given as tetanus wound prophylaxis.   Tdap Vaccine - COST NOT COVERED BY MEDICARE PART B Recommended at least once for all adults. For pregnant patients, recommended with each pregnancy.   Shingles Vaccine (Shingrix) - COST NOT COVERED BY MEDICARE PART B  2 shot series recommended in those 19 years and older who have or will have weakened immune systems or those 50 years and older     Health Maintenance Due:      Topic Date Due   • Breast Cancer Screening: Mammogram  02/27/2025   • Colorectal Cancer Screening  12/18/2026   • Hepatitis C Screening  Completed     Immunizations Due:      Topic Date Due   • Pneumococcal Vaccine: 65+ Years (1 of 1 - PCV) Never done   • Influenza Vaccine (1) 09/01/2024   • COVID-19 Vaccine (4 - 2024-25 season) 09/01/2024     Advance Directives   What are advance directives?  Advance directives are legal documents that state your wishes  and plans for medical care. These plans are made ahead of time in case you lose your ability to make decisions for yourself. Advance directives can apply to any medical decision, such as the treatments you want, and if you want to donate organs.   What are the types of advance directives?  There are many types of advance directives, and each state has rules about how to use them. You may choose a combination of any of the following:  Living will:  This is a written record of the treatment you want. You can also choose which treatments you do not want, which to limit, and which to stop at a certain time. This includes surgery, medicine, IV fluid, and tube feedings.   Durable power of  for healthcare (DPAHC):  This is a written record that states who you want to make healthcare choices for you when you are unable to make them for yourself. This person, called a proxy, is usually a family member or a friend. You may choose more than 1 proxy.  Do not resuscitate (DNR) order:  A DNR order is used in case your heart stops beating or you stop breathing. It is a request not to have certain forms of treatment, such as CPR. A DNR order may be included in other types of advance directives.  Medical directive:  This covers the care that you want if you are in a coma, near death, or unable to make decisions for yourself. You can list the treatments you want for each condition. Treatment may include pain medicine, surgery, blood transfusions, dialysis, IV or tube feedings, and a ventilator (breathing machine).  Values history:  This document has questions about your views, beliefs, and how you feel and think about life. This information can help others choose the care that you would choose.  Why are advance directives important?  An advance directive helps you control your care. Although spoken wishes may be used, it is better to have your wishes written down. Spoken wishes can be misunderstood, or not followed.  Treatments may be given even if you do not want them. An advance directive may make it easier for your family to make difficult choices about your care.   Weight Management   Why it is important to manage your weight:  Being overweight increases your risk of health conditions such as heart disease, high blood pressure, type 2 diabetes, and certain types of cancer. It can also increase your risk for osteoarthritis, sleep apnea, and other respiratory problems. Aim for a slow, steady weight loss. Even a small amount of weight loss can lower your risk of health problems.  How to lose weight safely:  A safe and healthy way to lose weight is to eat fewer calories and get regular exercise. You can lose up about 1 pound a week by decreasing the number of calories you eat by 500 calories each day.   Healthy meal plan for weight management:  A healthy meal plan includes a variety of foods, contains fewer calories, and helps you stay healthy. A healthy meal plan includes the following:  Eat whole-grain foods more often.  A healthy meal plan should contain fiber. Fiber is the part of grains, fruits, and vegetables that is not broken down by your body. Whole-grain foods are healthy and provide extra fiber in your diet. Some examples of whole-grain foods are whole-wheat breads and pastas, oatmeal, brown rice, and bulgur.  Eat a variety of vegetables every day.  Include dark, leafy greens such as spinach, kale, zee greens, and mustard greens. Eat yellow and orange vegetables such as carrots, sweet potatoes, and winter squash.   Eat a variety of fruits every day.  Choose fresh or canned fruit (canned in its own juice or light syrup) instead of juice. Fruit juice has very little or no fiber.  Eat low-fat dairy foods.  Drink fat-free (skim) milk or 1% milk. Eat fat-free yogurt and low-fat cottage cheese. Try low-fat cheeses such as mozzarella and other reduced-fat cheeses.  Choose meat and other protein foods that are low in fat.   Choose beans or other legumes such as split peas or lentils. Choose fish, skinless poultry (chicken or turkey), or lean cuts of red meat (beef or pork). Before you cook meat or poultry, cut off any visible fat.   Use less fat and oil.  Try baking foods instead of frying them. Add less fat, such as margarine, sour cream, regular salad dressing and mayonnaise to foods. Eat fewer high-fat foods. Some examples of high-fat foods include french fries, doughnuts, ice cream, and cakes.  Eat fewer sweets.  Limit foods and drinks that are high in sugar. This includes candy, cookies, regular soda, and sweetened drinks.  Exercise:  Exercise at least 30 minutes per day on most days of the week. Some examples of exercise include walking, biking, dancing, and swimming. You can also fit in more physical activity by taking the stairs instead of the elevator or parking farther away from stores. Ask your healthcare provider about the best exercise plan for you.      © Copyright [employer] 2018 Information is for End User's use only and may not be sold, redistributed or otherwise used for commercial purposes. All illustrations and images included in CareNotes® are the copyrighted property of [employer].A[employer]M., Inc. or [employer]

## 2025-03-11 ENCOUNTER — TELEPHONE (OUTPATIENT)
Age: 75
End: 2025-03-11

## 2025-03-11 DIAGNOSIS — Z12.39 SCREENING BREAST EXAMINATION: ICD-10-CM

## 2025-03-11 DIAGNOSIS — Z12.31 ENCOUNTER FOR SCREENING MAMMOGRAM FOR MALIGNANT NEOPLASM OF BREAST: Primary | ICD-10-CM

## 2025-03-18 ENCOUNTER — HOSPITAL ENCOUNTER (OUTPATIENT)
Dept: MAMMOGRAPHY | Facility: CLINIC | Age: 75
Discharge: HOME | End: 2025-03-18
Payer: MEDICARE

## 2025-03-18 VITALS — WEIGHT: 131 LBS | HEIGHT: 60 IN | BODY MASS INDEX: 25.72 KG/M2

## 2025-03-18 DIAGNOSIS — Z12.31 ENCOUNTER FOR SCREENING MAMMOGRAM FOR MALIGNANT NEOPLASM OF BREAST: ICD-10-CM

## 2025-03-18 PROCEDURE — 77063 BREAST TOMOSYNTHESIS BI: CPT

## 2025-03-18 PROCEDURE — 77067 SCR MAMMO BI INCL CAD: CPT

## 2025-06-04 LAB
ALBUMIN SERPL-MCNC: 3.9 G/DL (ref 3.6–5.1)
ALBUMIN/GLOB SERPL: 1.4 (CALC) (ref 1–2.5)
ALP SERPL-CCNC: 49 U/L (ref 37–153)
ALT SERPL-CCNC: 11 U/L (ref 6–29)
AST SERPL-CCNC: 17 U/L (ref 10–35)
BASOPHILS # BLD AUTO: 74 CELLS/UL (ref 0–200)
BASOPHILS NFR BLD AUTO: 1.1 %
BILIRUB SERPL-MCNC: 1.3 MG/DL (ref 0.2–1.2)
BUN SERPL-MCNC: 21 MG/DL (ref 7–25)
BUN/CREAT SERPL: ABNORMAL (CALC) (ref 6–22)
CALCIUM SERPL-MCNC: 9.2 MG/DL (ref 8.6–10.4)
CHLORIDE SERPL-SCNC: 106 MMOL/L (ref 98–110)
CHOLEST SERPL-MCNC: 210 MG/DL
CHOLEST/HDLC SERPL: 2.8 (CALC)
CO2 SERPL-SCNC: 27 MMOL/L (ref 20–32)
CREAT SERPL-MCNC: 0.67 MG/DL (ref 0.6–1)
EOSINOPHIL # BLD AUTO: 221 CELLS/UL (ref 15–500)
EOSINOPHIL NFR BLD AUTO: 3.3 %
ERYTHROCYTE [DISTWIDTH] IN BLOOD BY AUTOMATED COUNT: 13.1 % (ref 11–15)
GFR/BSA.PRED SERPLBLD CYS-BASED-ARV: 92 ML/MIN/1.73M2
GLOBULIN SER CALC-MCNC: 2.7 G/DL (CALC) (ref 1.9–3.7)
GLUCOSE SERPL-MCNC: 101 MG/DL (ref 65–99)
HCT VFR BLD AUTO: 41.3 % (ref 35–45)
HDLC SERPL-MCNC: 74 MG/DL
HGB BLD-MCNC: 13.8 G/DL (ref 11.7–15.5)
LDLC SERPL CALC-MCNC: 121 MG/DL (CALC)
LYMPHOCYTES # BLD AUTO: 2285 CELLS/UL (ref 850–3900)
LYMPHOCYTES NFR BLD AUTO: 34.1 %
MCH RBC QN AUTO: 32.5 PG (ref 27–33)
MCHC RBC AUTO-ENTMCNC: 33.4 G/DL (ref 32–36)
MCV RBC AUTO: 97.2 FL (ref 80–100)
MONOCYTES # BLD AUTO: 616 CELLS/UL (ref 200–950)
MONOCYTES NFR BLD AUTO: 9.2 %
NEUTROPHILS # BLD AUTO: 3504 CELLS/UL (ref 1500–7800)
NEUTROPHILS NFR BLD AUTO: 52.3 %
NONHDLC SERPL-MCNC: 136 MG/DL (CALC)
PLATELET # BLD AUTO: 373 THOUSAND/UL (ref 140–400)
PMV BLD REES-ECKER: 9.7 FL (ref 7.5–12.5)
POTASSIUM SERPL-SCNC: 4.5 MMOL/L (ref 3.5–5.3)
PROT SERPL-MCNC: 6.6 G/DL (ref 6.1–8.1)
RBC # BLD AUTO: 4.25 MILLION/UL (ref 3.8–5.1)
SODIUM SERPL-SCNC: 140 MMOL/L (ref 135–146)
TRIGL SERPL-MCNC: 59 MG/DL
WBC # BLD AUTO: 6.7 THOUSAND/UL (ref 3.8–10.8)

## 2025-06-11 ENCOUNTER — OFFICE VISIT (OUTPATIENT)
Dept: FAMILY MEDICINE CLINIC | Facility: CLINIC | Age: 75
End: 2025-06-11
Payer: MEDICARE

## 2025-06-11 VITALS
BODY MASS INDEX: 25.74 KG/M2 | HEART RATE: 87 BPM | WEIGHT: 131.8 LBS | OXYGEN SATURATION: 98 % | DIASTOLIC BLOOD PRESSURE: 80 MMHG | SYSTOLIC BLOOD PRESSURE: 120 MMHG

## 2025-06-11 DIAGNOSIS — R53.83 FATIGUE, UNSPECIFIED TYPE: Primary | ICD-10-CM

## 2025-06-11 PROCEDURE — G2211 COMPLEX E/M VISIT ADD ON: HCPCS | Performed by: NURSE PRACTITIONER

## 2025-06-11 PROCEDURE — 99213 OFFICE O/P EST LOW 20 MIN: CPT | Performed by: NURSE PRACTITIONER

## 2025-06-11 NOTE — PROGRESS NOTES
Name: Shasha Ken      : 1950      MRN: [id number]  Encounter Provider: KATHIE Abdi  Encounter Date: 2025   Encounter department: [facility]  :  Assessment & Plan  Fatigue, unspecified type  C/o fatigue. Reviewed labs. CBC normal, Total cholesterol and ldl were slightly elevated.  Patient states she is active. Discussed ways to help with fatigue-taking vitamins, eating a healthy diet, decreasing sugars/carbs, relaxation techniques, getting adequate sleep. Instructed to call if symptoms do not improve/worsen.              History of Present Illness   C/o fatigue, review labs.      Review of Systems   Constitutional:  Positive for fatigue. Negative for activity change and fever.   HENT: Negative.     Respiratory:  Negative for cough, chest tightness, shortness of breath and wheezing.    Cardiovascular:  Negative for chest pain, palpitations and leg swelling.   Gastrointestinal:  Negative for abdominal pain.   Musculoskeletal:  Negative for gait problem.   Skin:  Negative for color change and pallor.   Neurological:  Negative for dizziness, weakness and headaches.   Psychiatric/Behavioral:  Negative for dysphoric mood.        Objective   /80 (BP Location: Left arm, Patient Position: Sitting, Cuff Size: Standard)   Pulse 87   Wt 59.8 kg (131 lb 12.8 oz)   LMP  (LMP Unknown)   SpO2 98%   BMI 25.74 kg/m²      Physical Exam  Vitals and nursing note reviewed.   Constitutional:       General: She is not in acute distress.     Appearance: Normal appearance. She is not ill-appearing or diaphoretic.   HENT:      Head: Normocephalic.     Eyes:      Extraocular Movements: Extraocular movements intact.       Cardiovascular:      Rate and Rhythm: Normal rate and regular rhythm.      Heart sounds: Normal heart sounds.   Pulmonary:      Effort: Pulmonary effort is normal. No respiratory distress.      Breath sounds: Normal breath sounds. No wheezing or rhonchi.    Chest:      Chest wall: No tenderness.     Skin:     General: Skin is warm and dry.     Neurological:      Mental Status: She is alert and oriented to person, place, and time.     Psychiatric:         Mood and Affect: Mood normal.         Behavior: Behavior normal.

## 2025-06-11 NOTE — PATIENT INSTRUCTIONS
Patient Education     Fatigue   About this topic   Fatigue is a strong feeling of being tired and weak. This often happens after doing activities that are very hard to do. Then, you don't have much energy to do other things. Just as your body can be fatigued, your mind can as well. You might have trouble being able to think clearly about things. Some people have little desire to do anything. Fatigue is normal when you have had physical and mental activity. Stress can also cause fatigue. Other causes of fatigue can be the flu or other health problems, drugs, or sleep problems.  Fatigue can also be a sign of a more serious problem. Some of these are:  Low red blood cells  Anxiety or worrying too much  Low mood  Always being in pain  Problems with your thyroid, liver, or kidneys  Drug or alcohol use  Health problems like cancer, arthritis, and heart or lung disease  Most of the time, fatigue will get better after a few days of rest.     What are the causes?   Lifestyle causes like lack of sleep, working too much, unhealthy habits, or getting too little or too much exercise  Emotional causes like stress, low mood, grief, or being bored  Medical causes like illnesses or certain drugs that you take for those problems  What are the main signs?   Feeling tired or sleepy  Having no energy or feeling weak  Feeling worn out and needing to rest a lot  Not caring about work and other activities  How does the doctor diagnose this health problem?   Your doctor will take your history and do an exam. The doctor will ask you questions about how you feel. The doctor may order:  Lab tests  X-ray  CT scan  Electrocardiogram (ECG)  How does the doctor treat this health problem?   Your doctor will need to find out what is causing the problem to treat it. In many cases, more rest, sleep, a good diet, and less stress may help. Sometimes, the problem is caused by a more serious illness or problem. Your doctor will work to find the cause.  Your doctor may send you to an expert to help with low mood or worry.  What drugs may be needed?   The doctor may order drugs to:  Give extra vitamins and minerals  Treat the problem that causes the fatigue  What problems could happen?   Body's normal defenses or immune system are lowered  Not able to do the things you often do  Problems with sex  Not feeling hungry  Not being able to act as fast or do things as well. This could cause accidents when driving, at work, or at home.  Headaches, feeling angry, and not able to think clearly about things. These could cause you to not make good decisions.  Trouble with your memory or concentration  Having problems walking and exercising  Falling asleep during the day  Having problems seeing or seeing things that are not really there  Feeling like not doing things  What can be done to prevent this health problem?   Learn to cope well with your work and stress.  Do relaxation exercises.  Try to get enough sleep at night.  Eat healthy foods. Don't eat foods that have a lot of sugar.  Limit your alcohol intake  Last Reviewed Date   2021-02-24  Consumer Information Use and Disclaimer   This generalized information is a limited summary of diagnosis, treatment, and/or medication information. It is not meant to be comprehensive and should be used as a tool to help the user understand and/or assess potential diagnostic and treatment options. It does NOT include all information about conditions, treatments, medications, side effects, or risks that may apply to a specific patient. It is not intended to be medical advice or a substitute for the medical advice, diagnosis, or treatment of a health care provider based on the health care provider's examination and assessment of a patient’s specific and unique circumstances. Patients must speak with a health care provider for complete information about their health, medical questions, and treatment options, including any risks or benefits  regarding use of medications. This information does not endorse any treatments or medications as safe, effective, or approved for treating a specific patient. [employer] and its affiliates disclaim any warranty or liability relating to this information or the use thereof. The use of this information is governed by the Terms of Use, available at https://www.[employer].com/en/know/clinical-effectiveness-terms   Copyright   Copyright © 2024 [employer] and its affiliates and/or licensors. All rights reserved.

## 2025-06-11 NOTE — ASSESSMENT & PLAN NOTE
C/o fatigue. Reviewed labs. CBC normal, Total cholesterol and ldl were slightly elevated.  Patient states she is active. Discussed ways to help with fatigue-taking vitamins, eating a healthy diet, decreasing sugars/carbs, relaxation techniques, getting adequate sleep. Instructed to call if symptoms do not improve/worsen.